# Patient Record
Sex: MALE | Race: WHITE | Employment: OTHER | URBAN - METROPOLITAN AREA
[De-identification: names, ages, dates, MRNs, and addresses within clinical notes are randomized per-mention and may not be internally consistent; named-entity substitution may affect disease eponyms.]

---

## 2017-04-27 ENCOUNTER — OFFICE VISIT (OUTPATIENT)
Dept: NEUROLOGY | Age: 62
End: 2017-04-27
Payer: MEDICARE

## 2017-04-27 VITALS
SYSTOLIC BLOOD PRESSURE: 144 MMHG | BODY MASS INDEX: 30.16 KG/M2 | DIASTOLIC BLOOD PRESSURE: 90 MMHG | WEIGHT: 181 LBS | HEIGHT: 65 IN | HEART RATE: 88 BPM

## 2017-04-27 DIAGNOSIS — F84.0 AUTISTIC SPECTRUM DISORDER: ICD-10-CM

## 2017-04-27 DIAGNOSIS — G40.309 GENERALIZED NONCONVULSIVE EPILEPSY (HCC): Primary | ICD-10-CM

## 2017-04-27 PROCEDURE — G8427 DOCREV CUR MEDS BY ELIG CLIN: HCPCS | Performed by: PSYCHIATRY & NEUROLOGY

## 2017-04-27 PROCEDURE — 1036F TOBACCO NON-USER: CPT | Performed by: PSYCHIATRY & NEUROLOGY

## 2017-04-27 PROCEDURE — 99214 OFFICE O/P EST MOD 30 MIN: CPT | Performed by: PSYCHIATRY & NEUROLOGY

## 2017-04-27 PROCEDURE — G8417 CALC BMI ABV UP PARAM F/U: HCPCS | Performed by: PSYCHIATRY & NEUROLOGY

## 2017-04-27 PROCEDURE — 3017F COLORECTAL CA SCREEN DOC REV: CPT | Performed by: PSYCHIATRY & NEUROLOGY

## 2017-04-27 RX ORDER — CARBAMAZEPINE 100 MG/1
100 CAPSULE, EXTENDED RELEASE ORAL DAILY
Qty: 30 CAPSULE | Refills: 5 | Status: SHIPPED | OUTPATIENT
Start: 2017-05-20 | End: 2017-10-18 | Stop reason: SDUPTHER

## 2017-04-28 ENCOUNTER — TELEPHONE (OUTPATIENT)
Dept: NEUROLOGY | Age: 62
End: 2017-04-28

## 2017-04-28 ENCOUNTER — HOSPITAL ENCOUNTER (OUTPATIENT)
Dept: LAB | Age: 62
Discharge: HOME | End: 2017-04-28
Payer: MEDICARE

## 2017-04-28 DIAGNOSIS — Z13.9 SCREENING: ICD-10-CM

## 2017-04-28 LAB
CHOLESTEROL/HDL RATIO: 5.6
CHOLESTEROL: 163 MG/DL
HDLC SERPL-MCNC: 29 MG/DL
LDL CHOLESTEROL: 85 MG/DL (ref 0–130)
TRIGL SERPL-MCNC: 244 MG/DL
VLDLC SERPL CALC-MCNC: ABNORMAL MG/DL (ref 1–30)

## 2017-04-28 PROCEDURE — 36415 COLL VENOUS BLD VENIPUNCTURE: CPT

## 2017-04-28 PROCEDURE — 80061 LIPID PANEL: CPT

## 2017-10-13 PROBLEM — K21.9 GASTROESOPHAGEAL REFLUX DISEASE: Status: ACTIVE | Noted: 2017-10-13

## 2017-10-20 RX ORDER — CARBAMAZEPINE 100 MG/1
CAPSULE, EXTENDED RELEASE ORAL
Qty: 30 CAPSULE | Refills: 0 | Status: SHIPPED | OUTPATIENT
Start: 2017-10-20 | End: 2017-11-16 | Stop reason: SDUPTHER

## 2017-11-17 RX ORDER — CARBAMAZEPINE 100 MG/1
CAPSULE, EXTENDED RELEASE ORAL
Qty: 30 CAPSULE | Refills: 2 | Status: SHIPPED | OUTPATIENT
Start: 2017-11-17 | End: 2018-02-14 | Stop reason: SDUPTHER

## 2018-02-15 RX ORDER — CARBAMAZEPINE 100 MG/1
CAPSULE, EXTENDED RELEASE ORAL
Qty: 30 CAPSULE | Refills: 2 | Status: SHIPPED | OUTPATIENT
Start: 2018-02-15 | End: 2021-07-27 | Stop reason: ALTCHOICE

## 2018-04-24 ENCOUNTER — OFFICE VISIT (OUTPATIENT)
Dept: NEUROLOGY | Age: 63
End: 2018-04-24
Payer: MEDICARE

## 2018-04-24 VITALS — WEIGHT: 172.4 LBS | BODY MASS INDEX: 28.72 KG/M2 | HEIGHT: 65 IN | HEART RATE: 78 BPM

## 2018-04-24 DIAGNOSIS — F84.0 AUTISTIC SPECTRUM DISORDER: ICD-10-CM

## 2018-04-24 DIAGNOSIS — G40.802 OTHER EPILEPSY WITHOUT STATUS EPILEPTICUS, NOT INTRACTABLE (HCC): ICD-10-CM

## 2018-04-24 DIAGNOSIS — G40.309 GENERALIZED NONCONVULSIVE EPILEPSY (HCC): Primary | ICD-10-CM

## 2018-04-24 PROCEDURE — G8427 DOCREV CUR MEDS BY ELIG CLIN: HCPCS | Performed by: PSYCHIATRY & NEUROLOGY

## 2018-04-24 PROCEDURE — 3017F COLORECTAL CA SCREEN DOC REV: CPT | Performed by: PSYCHIATRY & NEUROLOGY

## 2018-04-24 PROCEDURE — G8417 CALC BMI ABV UP PARAM F/U: HCPCS | Performed by: PSYCHIATRY & NEUROLOGY

## 2018-04-24 PROCEDURE — 1036F TOBACCO NON-USER: CPT | Performed by: PSYCHIATRY & NEUROLOGY

## 2018-04-24 PROCEDURE — 99214 OFFICE O/P EST MOD 30 MIN: CPT | Performed by: PSYCHIATRY & NEUROLOGY

## 2018-04-24 RX ORDER — QUETIAPINE FUMARATE 25 MG/1
25 TABLET, FILM COATED ORAL NIGHTLY
COMMUNITY

## 2018-06-07 ENCOUNTER — TELEPHONE (OUTPATIENT)
Dept: NEUROLOGY | Age: 63
End: 2018-06-07

## 2018-07-27 ENCOUNTER — HOSPITAL ENCOUNTER (OUTPATIENT)
Dept: PREADMISSION TESTING | Age: 63
Discharge: HOME | End: 2018-07-27

## 2018-08-14 ENCOUNTER — OFFICE VISIT (OUTPATIENT)
Dept: NEUROLOGY | Age: 63
End: 2018-08-14
Payer: MEDICARE

## 2018-08-14 VITALS — HEIGHT: 65 IN | WEIGHT: 172 LBS | BODY MASS INDEX: 28.66 KG/M2 | HEART RATE: 88 BPM

## 2018-08-14 DIAGNOSIS — F84.0 AUTISTIC SPECTRUM DISORDER: ICD-10-CM

## 2018-08-14 DIAGNOSIS — G40.802 OTHER EPILEPSY WITHOUT STATUS EPILEPTICUS, NOT INTRACTABLE (HCC): ICD-10-CM

## 2018-08-14 DIAGNOSIS — G40.309 GENERALIZED NONCONVULSIVE EPILEPSY (HCC): Primary | ICD-10-CM

## 2018-08-14 PROCEDURE — G8427 DOCREV CUR MEDS BY ELIG CLIN: HCPCS | Performed by: PSYCHIATRY & NEUROLOGY

## 2018-08-14 PROCEDURE — 1123F ACP DISCUSS/DSCN MKR DOCD: CPT | Performed by: PSYCHIATRY & NEUROLOGY

## 2018-08-14 PROCEDURE — G8417 CALC BMI ABV UP PARAM F/U: HCPCS | Performed by: PSYCHIATRY & NEUROLOGY

## 2018-08-14 PROCEDURE — 1036F TOBACCO NON-USER: CPT | Performed by: PSYCHIATRY & NEUROLOGY

## 2018-08-14 PROCEDURE — 4040F PNEUMOC VAC/ADMIN/RCVD: CPT | Performed by: PSYCHIATRY & NEUROLOGY

## 2018-08-14 PROCEDURE — 1101F PT FALLS ASSESS-DOCD LE1/YR: CPT | Performed by: PSYCHIATRY & NEUROLOGY

## 2018-08-14 PROCEDURE — 99213 OFFICE O/P EST LOW 20 MIN: CPT | Performed by: PSYCHIATRY & NEUROLOGY

## 2018-08-14 PROCEDURE — 3017F COLORECTAL CA SCREEN DOC REV: CPT | Performed by: PSYCHIATRY & NEUROLOGY

## 2018-08-14 NOTE — PROGRESS NOTES
NEUROLOGY FOLLOW-UP  Patient Name:  Fany Lemos  :   1955  Clinic Visit Date: 2018    I saw Mr. Fany Lemos in follow-up in the office today in continuation of neurologic care. REVIEW OF SYSTEMS  Constitutional Weight changes: absent, Fevers : absent, Fatigue: absent; Any recent hospitalizations:  absent.    HEENT Ears: normal, Eyes: normal, Visual disturbance: absent   Reespiratory Shortness of breath: absent, Cough: absent   Cardivascular Chest pain: absent, Leg swelling :absent   GI Constipation: absent, Diarrhea: absent, Swallowing change: absent    Urinary frequency: absent, Urinary urgency: absent, Urinary incontinence: absent   Musculoskeletal Neck pain: absent, Back pain: absent, Stiffness: absent, Muscle pain: absent, Joint pain: absent   Dermatological Hair loss: absent, Skin changes: absent   Neurological Memory loss: absent, Confusion: absent, Seizures: absent; Trouble walking or imbalance: absent, Dizziness: absent, Weakness: absent, Numbness absent, Tremor: absent, Spasm: absent, Speech difficulty: absent, Headache: absent, Light sensitivity: absent   Psychiatric Anxiety: present, Depression  absent, Suicidal ideations absent   Hematologic Abnormal bleeding: absent, Anemia: absent, Clotting disorder: absent, Lymph gland changes: absent

## 2019-02-15 ENCOUNTER — HOSPITAL ENCOUNTER (OUTPATIENT)
Dept: LAB | Age: 64
Discharge: HOME | End: 2019-02-15
Payer: MEDICARE

## 2019-02-15 LAB
ABSOLUTE EOS #: 0.2 K/UL (ref 0–0.44)
ABSOLUTE IMMATURE GRANULOCYTE: 0.03 K/UL (ref 0–0.3)
ABSOLUTE LYMPH #: 2.68 K/UL (ref 1.1–3.7)
ABSOLUTE MONO #: 0.68 K/UL (ref 0.1–1.2)
ANION GAP SERPL CALCULATED.3IONS-SCNC: 9 MMOL/L (ref 9–17)
BASOPHILS # BLD: 1 % (ref 0–2)
BASOPHILS ABSOLUTE: 0.06 K/UL (ref 0–0.2)
BUN BLDV-MCNC: 17 MG/DL (ref 8–23)
BUN/CREAT BLD: 18 (ref 9–20)
CALCIUM SERPL-MCNC: 9.4 MG/DL (ref 8.6–10.4)
CHLORIDE BLD-SCNC: 102 MMOL/L (ref 98–107)
CHOLESTEROL/HDL RATIO: 4.1
CHOLESTEROL: 115 MG/DL
CO2: 26 MMOL/L (ref 20–31)
CREAT SERPL-MCNC: 0.97 MG/DL (ref 0.7–1.2)
DIFFERENTIAL TYPE: NORMAL
EOSINOPHILS RELATIVE PERCENT: 3 % (ref 1–4)
ESTIMATED AVERAGE GLUCOSE: 94 MG/DL
GFR AFRICAN AMERICAN: >60 ML/MIN
GFR NON-AFRICAN AMERICAN: >60 ML/MIN
GFR SERPL CREATININE-BSD FRML MDRD: ABNORMAL ML/MIN/{1.73_M2}
GFR SERPL CREATININE-BSD FRML MDRD: ABNORMAL ML/MIN/{1.73_M2}
GLUCOSE BLD-MCNC: 101 MG/DL (ref 70–99)
HBA1C MFR BLD: 4.9 % (ref 4.8–5.9)
HCT VFR BLD CALC: 44.4 % (ref 40.7–50.3)
HDLC SERPL-MCNC: 28 MG/DL
HEMOGLOBIN: 14.8 G/DL (ref 13–17)
IMMATURE GRANULOCYTES: 0 %
LDL CHOLESTEROL: 59 MG/DL (ref 0–130)
LYMPHOCYTES # BLD: 34 % (ref 24–43)
MCH RBC QN AUTO: 29.1 PG (ref 25.2–33.5)
MCHC RBC AUTO-ENTMCNC: 33.3 G/DL (ref 28.4–34.8)
MCV RBC AUTO: 87.4 FL (ref 82.6–102.9)
MONOCYTES # BLD: 9 % (ref 3–12)
NRBC AUTOMATED: 0 PER 100 WBC
PDW BLD-RTO: 13.3 % (ref 11.8–14.4)
PLATELET # BLD: 189 K/UL (ref 138–453)
PLATELET ESTIMATE: NORMAL
PMV BLD AUTO: 10.4 FL (ref 8.1–13.5)
POTASSIUM SERPL-SCNC: 3.6 MMOL/L (ref 3.7–5.3)
RBC # BLD: 5.08 M/UL (ref 4.21–5.77)
RBC # BLD: NORMAL 10*6/UL
SEG NEUTROPHILS: 53 % (ref 36–65)
SEGMENTED NEUTROPHILS ABSOLUTE COUNT: 4.28 K/UL (ref 1.5–8.1)
SODIUM BLD-SCNC: 137 MMOL/L (ref 135–144)
TRIGL SERPL-MCNC: 140 MG/DL
VLDLC SERPL CALC-MCNC: ABNORMAL MG/DL (ref 1–30)
WBC # BLD: 7.9 K/UL (ref 3.5–11.3)
WBC # BLD: NORMAL 10*3/UL

## 2019-02-15 PROCEDURE — 83036 HEMOGLOBIN GLYCOSYLATED A1C: CPT

## 2019-02-15 PROCEDURE — 36415 COLL VENOUS BLD VENIPUNCTURE: CPT

## 2019-02-15 PROCEDURE — 85025 COMPLETE CBC W/AUTO DIFF WBC: CPT

## 2019-02-15 PROCEDURE — 80048 BASIC METABOLIC PNL TOTAL CA: CPT

## 2019-02-15 PROCEDURE — 80061 LIPID PANEL: CPT

## 2019-04-12 PROBLEM — R09.82 ALLERGIC RHINITIS WITH POSTNASAL DRIP: Status: ACTIVE | Noted: 2019-04-12

## 2019-04-12 PROBLEM — J30.9 ALLERGIC RHINITIS WITH POSTNASAL DRIP: Status: ACTIVE | Noted: 2019-04-12

## 2019-09-17 ENCOUNTER — OFFICE VISIT (OUTPATIENT)
Dept: NEUROLOGY | Age: 64
End: 2019-09-17
Payer: MEDICARE

## 2019-09-17 VITALS — RESPIRATION RATE: 16 BRPM | WEIGHT: 138 LBS | HEART RATE: 76 BPM | BODY MASS INDEX: 26.06 KG/M2 | HEIGHT: 61 IN

## 2019-09-17 DIAGNOSIS — F84.0 AUTISTIC SPECTRUM DISORDER: ICD-10-CM

## 2019-09-17 DIAGNOSIS — G40.309 GENERALIZED NONCONVULSIVE EPILEPSY (HCC): Primary | ICD-10-CM

## 2019-09-17 PROCEDURE — 99213 OFFICE O/P EST LOW 20 MIN: CPT | Performed by: PSYCHIATRY & NEUROLOGY

## 2019-09-17 PROCEDURE — 3017F COLORECTAL CA SCREEN DOC REV: CPT | Performed by: PSYCHIATRY & NEUROLOGY

## 2019-09-17 PROCEDURE — 1036F TOBACCO NON-USER: CPT | Performed by: PSYCHIATRY & NEUROLOGY

## 2019-09-17 PROCEDURE — 4040F PNEUMOC VAC/ADMIN/RCVD: CPT | Performed by: PSYCHIATRY & NEUROLOGY

## 2019-09-17 PROCEDURE — 1123F ACP DISCUSS/DSCN MKR DOCD: CPT | Performed by: PSYCHIATRY & NEUROLOGY

## 2019-09-17 PROCEDURE — G8427 DOCREV CUR MEDS BY ELIG CLIN: HCPCS | Performed by: PSYCHIATRY & NEUROLOGY

## 2019-09-17 PROCEDURE — G8417 CALC BMI ABV UP PARAM F/U: HCPCS | Performed by: PSYCHIATRY & NEUROLOGY

## 2019-09-23 PROBLEM — Z86.010 HISTORY OF COLON POLYPS: Status: ACTIVE | Noted: 2019-09-23

## 2019-09-23 PROBLEM — Z12.11 COLON CANCER SCREENING: Status: ACTIVE | Noted: 2019-09-23

## 2019-09-23 PROBLEM — Z86.0100 HISTORY OF COLON POLYPS: Status: ACTIVE | Noted: 2019-09-23

## 2019-11-04 ENCOUNTER — ANESTHESIA EVENT (OUTPATIENT)
Dept: OPERATING ROOM | Age: 64
End: 2019-11-04

## 2019-11-04 ENCOUNTER — ANESTHESIA (OUTPATIENT)
Dept: OPERATING ROOM | Age: 64
End: 2019-11-04

## 2019-11-10 PROBLEM — Z12.11 COLON CANCER SCREENING: Status: RESOLVED | Noted: 2019-09-23 | Resolved: 2019-11-10

## 2020-10-21 NOTE — PROGRESS NOTES
NEUROLOGY FOLLOW-UP  Patient Name:  Mari Dillard  :   1955  Clinic Visit Date: 10/22/2020  Last visit date: 2019      I saw Mr. Mari Dillard in follow-up in the office today in continuation of neurologic care. He is a 77 y.o.   male with hx of autistic spectrum disorder and seizures. Patient is brought to the clinic by Caregiver at [facility],  Mr. Ish Galvan stating that he has not had any witnessed seizure activity. He had been on slow downward taper of Tegretol. Staff at [facility] hasn't noticed him having any seizure spells. Caregivers were advised to stop Tegretol if it is okay with Dr. Jose Del Angel. It appears that patient is being continued on Tegretol for psychiatric reasons. He also hasn't had any hospitalizations or ED visits since the last visit. Of note; he has not had any witnessed the seizure activity since . CT head in  and EEG in 2010 were negative. His basic functional status remain unchanged. He feeds himself and he is dependent on rest of ADLs such as bathing, etc.        REVIEW OF SYSTEMS done by staff reviewed and pertinent negatives include absence of seizures and pertinent positives include speech difficulties associated with OCD, Autistic Spectrum Disorder.       Current Outpatient Medications on File Prior to Visit   Medication Sig Dispense Refill    loratadine (CLARITIN) 10 MG tablet Take 1 tablet by mouth daily 90 tablet 2    meloxicam (MOBIC) 7.5 MG tablet TAKE 1 TABLET BY MOUTH ONCE DAILY [id number] 90 tablet 2    omeprazole (PRILOSEC) 20 MG delayed release capsule TAKE 2 CAPSULES (40MG) BY MOUTH ONCE DAILY [id number] 180 capsule 2    Calcium Carbonate-Vitamin D (OYSTER SHELL CALCIUM/D) 500-200 MG-UNIT TABS TAKE ONE TABLET BY MOUTH TWO TIMES DAILY [id number] 180 tablet 2    acetaminophen (V-R NON-ASPIRIN) 500 MG tablet Take 2 tablets by mouth every 8 hours 60 tablet 3    oxybutynin (DITROPAN-XL) 5 MG extended release tablet Take 1 tablet by mouth daily 30 tablet 5    acetaminophen (TYLENOL) 500 MG tablet Take 2 tablets by mouth every 8 hours as needed for Pain or Fever 180 tablet 3    chlorhexidine (PERIDEX) 0.12 % solution Use orally to rinse twice daily with cotton swab 1 Bottle 2    Na Sulfate-K Sulfate-Mg Sulf (SUPREP BOWEL PREP KIT) 17.5-3.13-1.6 GM/177ML SOLN Take as directed 2 Bottle 0    OYSTER SHELL PO Take 500 mg by mouth 2 times daily      QUEtiapine (SEROQUEL) 25 MG tablet Take 25 mg by mouth nightly       carBAMazepine (CARBATROL) 100 MG extended release capsule TAKE 1 CAPSULE BY MOUTH ONCE DAILY  [id number] 30 capsule 2    UNABLE TO FIND Zostavax/shingles vaccine. 1 Act 0    Incontinence Supply Disposable (FQ PROTECTIVE UNDERWEAR) MISC USE AS DIRECTED FOR INCONTINENCE ([phone] *CALL PHARMACY FOR REFILLS* 80 Bottle 3    Disposable Gloves (VINYL GLOVES LARGE) MISC 1 PAIR AS DIRECTED QD- each 5    UNABLE TO FIND Zostavax 1 Act 0    neomycin-bacitracin-polymyxin (NEOSPORIN) 5-400-5000 ointment Apply topically 4 times daily prn to affected area(s) 28 g 3    sertraline (ZOLOFT) 50 MG tablet Take  by mouth daily. Take 1 1/2 tablets at bedtime       No current facility-administered medications on file prior to visit. Allergies: Venkata Moscoso has No Known Allergies. Past Medical History:   Diagnosis Date    Allergic rhinitis     Autism     Epilepsy ([facility] [address]     GERD (gastroesophageal reflux disease)     Hypertriglyceridemia     Mental retardation     Occupational circumstances     [employer]    Seizures ([facility] [address]        Past Surgical History:   Procedure Laterality Date    COLONOSCOPY  2012    Polyps     Social History: Venkata Moscoso  reports that he has never smoked. He has never used smokeless tobacco. He reports that he does not drink alcohol or use drugs. No family history on file.     On exam: vitals: Blood pressure 138/88, pulse 84, temperature 98.2 °F (36.8 °C), height 5' 5\" (1.651 m), weight 152 lb (68.9 kg).    Mr. Clarissa Vicente appears comfortable and with ongoing rocking movements and he does not seem to be in pain. He appears comfortable and alert and awake and oriented to name only. He does not follow commands. PERRLA and full EOMs noted. Face appears symmetric and tongue is midline. Palate is elevating symmetrically. Able to hear to finger rub equally well bilaterally. Moves all extremities spontaneously and purposefully. DTRs are symmetric and he withdraws to pinprick equally well. He is able to walk unassisted. He  has bilateral extensor plantar response. He is able to walk with out much support. He has bilateral extensor plantar response. Diagnostic data reviewed with the patient:   EEG (October 2013): This EEG performed during drowsiness and sleep did not demonstrate any ongoing electrographic seizure activity. But the study also demonstrated muscle artifacts during wakefulness. Clinical correlation is recommended. CT NFBA(3988): Negative for bleed and any acute intracranial abnormalities. CBC:     Lab Results   Component Value Date    WBC 7.9 02/15/2019    HGB 14.8 02/15/2019     02/15/2019      BMP:     Lab Results   Component Value Date     02/15/2019    K 3.6 (L) 02/15/2019    GLUCOSE 101 (H) 02/15/2019    CALCIUM 9.4 02/15/2019       Lab Results   Component Value Date    CBMZ 2.1 (L) 12/07/2015       Lab Results   Component Value Date    CHOL 115 02/15/2019    LDLCHOLESTEROL 59 02/15/2019    HDL 28 (L) 02/15/2019    TRIG 140 02/15/2019    ALT 29 12/08/2015    AST 27 12/08/2015    LABA1C 4.9 02/15/2019                   Impression and Plan: Mr. Louis Abernathy is a 77 y.o. male with   Diagnosis of seizure disorder: without any breakthrough  Seizure since 2011/ 2012. Patient's prior CT head and EEGs were negative. No witnessed spells. No reason to be on Tegretol for neurologic reasons. He is on Tegretol  mg once daily for psychiatric reasons.     It is okay to stop if it is agreeable with psychiatrist, Dr. Angeline Oshea. Caregivers were asked to contact the clinic should they notice any recurrence of signs and symptoms indicating seizure activity. Speech difficulty, stereotypic rocking back-and-forth body movements likely related to underlying Tourette's syndrome/comorbid OCD & comorbid autistic spectrum disorder: Under care of  Psychiatrist, Dr. Angeline Oshea. Follow up in 1 year. Please note that portions of this note were completed with a voice recognition program.  Although every effort was made to ensure the accuracy of this automated transcription, some errors in transcription may have occurred; occasionally words are mis-transcribed. Thank you for understanding.

## 2020-10-22 ENCOUNTER — OFFICE VISIT (OUTPATIENT)
Dept: NEUROLOGY | Age: 65
End: 2020-10-22
Payer: MEDICARE

## 2020-10-22 ENCOUNTER — TELEPHONE (OUTPATIENT)
Dept: NEUROLOGY | Age: 65
End: 2020-10-22

## 2020-10-22 VITALS
HEART RATE: 84 BPM | HEIGHT: 65 IN | BODY MASS INDEX: 25.33 KG/M2 | WEIGHT: 152 LBS | DIASTOLIC BLOOD PRESSURE: 88 MMHG | TEMPERATURE: 98.2 F | SYSTOLIC BLOOD PRESSURE: 138 MMHG

## 2020-10-22 PROBLEM — F98.4 STEREOTYPY: Status: ACTIVE | Noted: 2020-10-22

## 2020-10-22 PROCEDURE — 4040F PNEUMOC VAC/ADMIN/RCVD: CPT | Performed by: PSYCHIATRY & NEUROLOGY

## 2020-10-22 PROCEDURE — G8417 CALC BMI ABV UP PARAM F/U: HCPCS | Performed by: PSYCHIATRY & NEUROLOGY

## 2020-10-22 PROCEDURE — 3017F COLORECTAL CA SCREEN DOC REV: CPT | Performed by: PSYCHIATRY & NEUROLOGY

## 2020-10-22 PROCEDURE — 1036F TOBACCO NON-USER: CPT | Performed by: PSYCHIATRY & NEUROLOGY

## 2020-10-22 PROCEDURE — 99214 OFFICE O/P EST MOD 30 MIN: CPT | Performed by: PSYCHIATRY & NEUROLOGY

## 2020-10-22 PROCEDURE — G8482 FLU IMMUNIZE ORDER/ADMIN: HCPCS | Performed by: PSYCHIATRY & NEUROLOGY

## 2020-10-22 PROCEDURE — 1123F ACP DISCUSS/DSCN MKR DOCD: CPT | Performed by: PSYCHIATRY & NEUROLOGY

## 2020-10-22 PROCEDURE — G8427 DOCREV CUR MEDS BY ELIG CLIN: HCPCS | Performed by: PSYCHIATRY & NEUROLOGY

## 2020-10-22 NOTE — PROGRESS NOTES
HEENT [] Hearing Loss  [] Visual Disturbance  [] Tinnitus  [] Eye pain   Respiratory [] Shortness of Breath  [] Cough  [] Snoring   Cardiovascular [] Chest Pain  [] Palpitations  [] Lightheaded   GI [] Constipation  [] Diarrhea  [] Swallowing change  [] Nausea/vomiting    [] Urinary Frequency  [] Urinary Urgency   Musculoskeletal [] Neck pain  [] Back pain  [] Muscle pain  [] Restless legs   Dermatologic [] Skin changes   Neurologic [] Memory loss/confusion  [] Seizures  [] Trouble walking or imbalance  [] Dizziness  [] Sleep disturbance  [] Weakness  [] Numbness  [] Tremors  [x] Speech Difficulty  [] Headaches  [] Light Sensitivity  [] Sound Sensitivity   Endocrinology []Excessive thirst  []Excessive hunger   Psychiatric [] Anxiety/Depression  [] Hallucination   Allergy/immunology []Hives/environmental allergies   Hematologic/lymph [] Abnormal bleeding  [] Abnormal bruising

## 2020-10-22 NOTE — LETTER
 Autism     Epile[facility] ([facility] [address]     GERD (gastroesophageal reflux disease)     Hypertriglyceridemia     Mental retardation     Occupational circumstances     [employer]    Seizures ([facility] [address]        Past Surgical History:   Procedure Laterality Date    COLONOSCOPY  2012    Polyps     Social History: Louis Abernathy  reports that he has never smoked. He has never used smokeless tobacco. He reports that he does not drink alcohol or use drugs. No family history on file. On exam: vitals: Blood pressure 138/88, pulse 84, temperature 98.2 °F (36.8 °C), height 5' 5\" (1.651 m), weight 152 lb (68.9 kg). Mr. Clarissa Vicente appears comfortable and with ongoing rocking movements and he does not seem to be in pain. He appears comfortable and alert and awake and oriented to name only. He does not follow commands. PERRLA and full EOMs noted. Face appears symmetric and tongue is midline. Palate is elevating symmetrically. Able to hear to finger rub equally well bilaterally. Moves all extremities spontaneously and purposefully. DTRs are symmetric and he withdraws to pinprick equally well. He is able to walk unassisted. He  has bilateral extensor plantar response. He is able to walk with out much support. He has bilateral extensor plantar response. Diagnostic data reviewed with the patient:   EEG (October 2013): This EEG performed during drowsiness and sleep did not demonstrate any ongoing electrographic seizure activity. But the study also demonstrated muscle artifacts during wakefulness. Clinical correlation is recommended. CT EJIK(2091): Negative for bleed and any acute intracranial abnormalities.           CBC:     Lab Results   Component Value Date    WBC 7.9 02/15/2019    HGB 14.8 02/15/2019     02/15/2019      BMP:     Lab Results   Component Value Date     02/15/2019    K 3.6 (L) 02/15/2019    GLUCOSE 101 (H) 02/15/2019    CALCIUM 9.4 02/15/2019       Lab Results   Component Value Date CBMZ 2.1 (L) 12/07/2015       Lab Results   Component Value Date    CHOL 115 02/15/2019    LDLCHOLESTEROL 59 02/15/2019    HDL 28 (L) 02/15/2019    TRIG 140 02/15/2019    ALT 29 12/08/2015    AST 27 12/08/2015    LABA1C 4.9 02/15/2019                   Impression and Plan: Mr. Caryl Cullen is a 77 y.o. male with   Diagnosis of seizure disorder: without any breakthrough  Seizure since 2011/ 2012. Patient's prior CT head and EEGs were negative. No witnessed spells. No reason to be on Tegretol for neurologic reasons. He is on Tegretol  mg once daily for psychiatric reasons. It is okay to stop if it is agreeable with psychiatrist, Dr. Silva Osborn. Caregivers were asked to contact the clinic should they notice any recurrence of signs and symptoms indicating seizure activity. Speech difficulty, stereotypic rocking back-and-forth body movements likely related to underlying Tourette's syndrome/comorbid OCD & comorbid autistic spectrum disorder: Under care of  Psychiatrist, Dr. Silva Osborn. Follow up in 1 year. Please note that portions of this note were completed with a voice recognition program.  Although every effort was made to ensure the accuracy of this automated transcription, some errors in transcription may have occurred; occasionally words are mis-transcribed. Thank you for understanding. If you have questions, please do not hesitate to call me. I look forward to following Rogelio Diamond along with you.     Sincerely,        Ksenia Montanez MD

## 2020-10-23 ENCOUNTER — HOSPITAL ENCOUNTER (OUTPATIENT)
Dept: SPEECH THERAPY | Age: 65
Setting detail: THERAPIES SERIES
Discharge: HOME | End: 2020-10-23
Payer: MEDICARE

## 2020-10-23 PROCEDURE — 92610 EVALUATE SWALLOWING FUNCTION: CPT

## 2020-10-23 ASSESSMENT — PAIN - FUNCTIONAL ASSESSMENT: PAIN_FUNCTIONAL_ASSESSMENT: 0-10

## 2020-10-23 ASSESSMENT — PAIN SCALES - GENERAL: PAINLEVEL_OUTOF10: 0

## 2020-10-23 NOTE — PROGRESS NOTES
PO  Recommendations: Modified barium swallow study       Compensatory Swallowing Strategies  Compensatory Swallowing Strategies: Alternate solids and liquids;Small bites/sips;Upright as possible for all oral intake; External pacing;Eat/Feed slowly; Check for pocketing of food on the Right; Check for pocketing of food on the Left;Remain upright for 30-45 minutes after meals    Treatment/Goals  Short-term Goals  Timeframe for Short-term Goals: 120 Days  Goal 1: Patient will consume safest, least restrictive diet level without overt s/sx of asp/pen in 80% of opportunities. Long-term Goals  Timeframe for Long-term Goals: 60 Days  Goal 1: Patient will tolerate MBSS to objectively assess swallowing function. General  Chart Reviewed: Yes      Pain Level: 0    Vision/Hearing  Vision  Vision: Within Functional Limits  Hearing  Hearing: Within functional limits    Oral Motor Deficits  Oral/Motor  Oral Motor: Exceptions to [facility]  Labial Strength: Reduced  Lingual Coordination: Reduced    Oral Phase Dysfunction  Oral Phase  Oral Phase: Exceptions  Oral Phase Dysfunction  Impaired Mastication: Soft Solid  Lingual/Palatal Residue: Mechanical soft  Oral Phase  Oral Phase - Comment: Patient presents with a mild oral dysphagia at this time. Dysphagia is largely due to patient's edentulous status. ST was unable to complete oral motor examination due to patient not wanting to be touched as well as decreased ability to follow directions. Informally, it appeared patient demonstrated adequate lingual and labial ROM. Slightly reduced lingual strength ; however, his oral phase appears adequate for current mechanical soft diet. Patient was able to clear oral residues with liquid wash. Fast rate of intake noted.      Indicators of Pharyngeal Phase Dysfunction   Pharyngeal Phase  Pharyngeal Phase: Exceptions  Indicators of Pharyngeal Phase Dysfunction  Cough - Delayed: Puree;Mechanical soft  Pharyngeal Phase   Pharyngeal: Patient's pharyngeal phase of swallow required further asssessment. Patient demonstrated delayed cough x 1 on mechanical soft solids and x 1 with puree solids. Peicemeal swallowing suspected with thin liquids - Patient swallowing 5-6 times for small bolus of chocolate milk (only liquid patient drinks). Laryngeal elevation was unable to be assessed due to patient refusing to be touched. Prognosis  Prognosis  Prognosis for safe diet advancement: guarded  Barriers to reach goals: age;cognitive deficits  Individuals consulted  Consulted and agree with results and recommendations: Patient; Other (add comment)(Caregiver Cleven Drilling)    Education  Patient Education: ST educated patient's caregiver re: diet recommendations, compensatory strategies and plan of care. Patient Education Response: Verbalizes understanding             Therapy Time  SLP Individual Minutes  Time In: 1150  Time Out: 1678  Minutes: 30          Patient presents to [facility] for evaluation of swallowing function. Patient's caregiver reports he was recently coughing when eating sloppy ksenia (ground meat without the bun). Patient has also recently had dentition removed. Patient's caregiver reports he \"sometimes\" coughs when eating, but it is not consistent. Patient presents with a mild oral dysphagia at this time. Dysphagia is largely due to patient's edentulous status. ST was unable to complete oral motor examination due to patient not wanting to be touched as well as decreased ability to follow directions. Informally, it appeared patient demonstrated adequate lingual and labial ROM. Slightly reduced lingual strength ; however, his oral phase appears adequate for current mechanical soft diet. Patient was able to clear oral residues with liquid wash. Fast rate of intake noted. Patient's pharyngeal phase of swallow required further asssessment. Patient demonstrated delayed cough x 1 on mechanical soft solids and x 1 with puree solids.  Peicemeal swallowing suspected with thin liquids - Patient swallowing 5-6 times for small bolus of chocolate milk (only liquid patient drinks). Laryngeal elevation was unable to be assessed due to patient refusing to be touched. ST recommends continued diet of mechanical soft solids and thin liquids at this time. Compensatory strategies should include: small bites/sips,pacing/slow rate (present smaller portions of food and then give more as needed, upright during and 30-45 minutes after PO intake, check for pocketing, encourage alternating solids/liquids (to clear oral cavity). ST recommends MBSS to further objectively assess swallowing function and ensure safest, least restrictive diet level. Patient is not likely to benefit from dysphagia therapy due to inability to consistently follow directions. Short Term Goals: Completed by 60 days from this evaluation date  Dates of Service to Include: 10/23/20 through 12/22/20         Short-term Goal(s):   Goal 1: Patient will tolerate MBS to further assess swallowing function. Long Term Goals:   1. Patient/Caregiver will be independent with home exercise program  2. Goal 1: Patient will tolerate safest, least restrictive diet level. Patient tolerated todays evaluation:    [x] Good   []  Fair   []  Poor    Treatment Given Today: [x] Evaluation           [x]Plans/ Goals discussed with pt/family/caregiver(s)                                         [x] Risks Benefits discussed with pt/family/caregiver(s)    Functional Outcome Measure       RECOMMENDATIONS:   __Patient to be seen by ST times per []week                                                                     []Month                                              []other:  __ ST not warranted at this time. __ A re-evaluation is recommended in ___ months. _X_ Therapy pending MBSS results. The results of these tests and the recommendations were explained to the patient and his caregiver.  The caregiver

## 2021-01-14 ENCOUNTER — HOSPITAL ENCOUNTER (OUTPATIENT)
Dept: SPEECH THERAPY | Age: 66
Setting detail: THERAPIES SERIES
Discharge: HOME | End: 2021-01-14
Payer: MEDICARE

## 2021-04-23 ENCOUNTER — HOSPITAL ENCOUNTER (OUTPATIENT)
Dept: LAB | Age: 66
Setting detail: SPECIMEN
Discharge: HOME | End: 2021-04-23
Payer: MEDICARE

## 2021-04-23 DIAGNOSIS — Z01.818 PREOPERATIVE TESTING: Primary | ICD-10-CM

## 2021-04-23 DIAGNOSIS — Z01.818 PREOPERATIVE TESTING: ICD-10-CM

## 2021-04-23 PROCEDURE — U0003 INFECTIOUS AGENT DETECTION BY NUCLEIC ACID (DNA OR RNA); SEVERE ACUTE RESPIRATORY SYNDROME CORONAVIRUS 2 (SARS-COV-2) (CORONAVIRUS DISEASE [COVID-19]), AMPLIFIED PROBE TECHNIQUE, MAKING USE OF HIGH THROUGHPUT TECHNOLOGIES AS DESCRIBED BY CMS-2020-01-R: HCPCS

## 2021-04-23 PROCEDURE — C9803 HOPD COVID-19 SPEC COLLECT: HCPCS

## 2021-04-23 PROCEDURE — U0005 INFEC AGEN DETEC AMPLI PROBE: HCPCS

## 2021-04-24 LAB
SARS-COV-2: NORMAL
SARS-COV-2: NOT DETECTED
SOURCE: NORMAL

## 2021-04-25 ENCOUNTER — TELEPHONE (OUTPATIENT)
Dept: PRIMARY CARE CLINIC | Age: 66
End: 2021-04-25

## 2021-04-28 ENCOUNTER — HOSPITAL ENCOUNTER (OUTPATIENT)
Dept: GENERAL RADIOLOGY | Age: 66
Discharge: HOME | End: 2021-04-30
Payer: MEDICARE

## 2021-04-28 ENCOUNTER — HOSPITAL ENCOUNTER (OUTPATIENT)
Age: 66
Discharge: HOME | End: 2021-04-28
Payer: MEDICARE

## 2021-04-28 DIAGNOSIS — R13.10 ABNORMAL SWALLOWING: ICD-10-CM

## 2021-04-28 LAB
ABSOLUTE EOS #: 0.17 K/UL (ref 0–0.44)
ABSOLUTE IMMATURE GRANULOCYTE: 0.04 K/UL (ref 0–0.3)
ABSOLUTE LYMPH #: 2.35 K/UL (ref 1.1–3.7)
ABSOLUTE MONO #: 0.59 K/UL (ref 0.1–1.2)
ANION GAP SERPL CALCULATED.3IONS-SCNC: 10 MMOL/L (ref 9–17)
BASOPHILS # BLD: 1 % (ref 0–2)
BASOPHILS ABSOLUTE: 0.08 K/UL (ref 0–0.2)
BUN BLDV-MCNC: 21 MG/DL (ref 8–23)
BUN/CREAT BLD: 22 (ref 9–20)
CALCIUM SERPL-MCNC: 10 MG/DL (ref 8.6–10.4)
CHLORIDE BLD-SCNC: 108 MMOL/L (ref 98–107)
CHOLESTEROL/HDL RATIO: 3.9
CHOLESTEROL: 110 MG/DL
CO2: 25 MMOL/L (ref 20–31)
CREAT SERPL-MCNC: 0.96 MG/DL (ref 0.7–1.2)
DIFFERENTIAL TYPE: ABNORMAL
EOSINOPHILS RELATIVE PERCENT: 2 % (ref 1–4)
ESTIMATED AVERAGE GLUCOSE: 91 MG/DL
GFR AFRICAN AMERICAN: >60 ML/MIN
GFR NON-AFRICAN AMERICAN: >60 ML/MIN
GFR SERPL CREATININE-BSD FRML MDRD: ABNORMAL ML/MIN/{1.73_M2}
GFR SERPL CREATININE-BSD FRML MDRD: ABNORMAL ML/MIN/{1.73_M2}
GLUCOSE BLD-MCNC: 105 MG/DL (ref 70–99)
HBA1C MFR BLD: 4.8 % (ref 4–6)
HCT VFR BLD CALC: 44.7 % (ref 40.7–50.3)
HDLC SERPL-MCNC: 28 MG/DL
HEMOGLOBIN: 15.2 G/DL (ref 13–17)
IMMATURE GRANULOCYTES: 1 %
LDL CHOLESTEROL: 51 MG/DL (ref 0–130)
LYMPHOCYTES # BLD: 30 % (ref 24–43)
MCH RBC QN AUTO: 29.3 PG (ref 25.2–33.5)
MCHC RBC AUTO-ENTMCNC: 34 G/DL (ref 28.4–34.8)
MCV RBC AUTO: 86.3 FL (ref 82.6–102.9)
MONOCYTES # BLD: 8 % (ref 3–12)
NRBC AUTOMATED: 0 PER 100 WBC
PDW BLD-RTO: 13.2 % (ref 11.8–14.4)
PLATELET # BLD: 181 K/UL (ref 138–453)
PLATELET ESTIMATE: ABNORMAL
PMV BLD AUTO: 10.6 FL (ref 8.1–13.5)
POTASSIUM SERPL-SCNC: 3.8 MMOL/L (ref 3.7–5.3)
RBC # BLD: 5.18 M/UL (ref 4.21–5.77)
RBC # BLD: ABNORMAL 10*6/UL
SEG NEUTROPHILS: 58 % (ref 36–65)
SEGMENTED NEUTROPHILS ABSOLUTE COUNT: 4.57 K/UL (ref 1.5–8.1)
SODIUM BLD-SCNC: 143 MMOL/L (ref 135–144)
TRIGL SERPL-MCNC: 153 MG/DL
VLDLC SERPL CALC-MCNC: ABNORMAL MG/DL (ref 1–30)
WBC # BLD: 7.8 K/UL (ref 3.5–11.3)
WBC # BLD: ABNORMAL 10*3/UL

## 2021-04-28 PROCEDURE — A4641 RADIOPHARM DX AGENT NOC: HCPCS | Performed by: INTERNAL MEDICINE

## 2021-04-28 PROCEDURE — 80048 BASIC METABOLIC PNL TOTAL CA: CPT

## 2021-04-28 PROCEDURE — 83036 HEMOGLOBIN GLYCOSYLATED A1C: CPT

## 2021-04-28 PROCEDURE — 85025 COMPLETE CBC W/AUTO DIFF WBC: CPT

## 2021-04-28 PROCEDURE — 80061 LIPID PANEL: CPT

## 2021-04-28 PROCEDURE — 36415 COLL VENOUS BLD VENIPUNCTURE: CPT

## 2021-04-28 PROCEDURE — 74230 X-RAY XM SWLNG FUNCJ C+: CPT

## 2021-04-28 PROCEDURE — 92611 MOTION FLUOROSCOPY/SWALLOW: CPT

## 2021-04-28 PROCEDURE — 6360000004 HC RX CONTRAST MEDICATION: Performed by: INTERNAL MEDICINE

## 2021-04-28 RX ADMIN — BARIUM SULFATE 355 ML: 0.6 SUSPENSION ORAL at 08:53

## 2021-04-28 ASSESSMENT — PAIN - FUNCTIONAL ASSESSMENT: PAIN_FUNCTIONAL_ASSESSMENT: FACES

## 2021-04-28 ASSESSMENT — PAIN SCALES - GENERAL: PAINLEVEL_OUTOF10: 0

## 2021-04-28 NOTE — PROCEDURES
INSTRUMENTAL SWALLOW REPORT  MODIFIED BARIUM SWALLOW    NAME: Han Hodges   : 1955  MRN: [id number]       Date of Eval: 2021     Ordering Physician: Dr. Nate Michaels   Referring Diagnosis(es):  r13.10    Past Medical History:  has a past medical history of Allergic rhinitis, Autism, Epilepsy ([facility] [address] GERD (gastroesophageal reflux disease), Hypertriglyceridemia, Mental retardation, Occupational circumstances, and Seizures ([facility] [address] Past Surgical History:  has a past surgical history that includes Colonoscopy (). Current Diet Solid Consistency: Dysphagia Minced and Moist (Dysphagia II)  Current Diet Liquid Consistency: Thin       Type of Study: Initial MBS       Recent CXR/CT of Chest: No recent chest x-ray on file. Patient Complaints/Reason for Referral:  Han Hodges was referred for a MBS to assess the efficiency of his/her swallow function, assess for aspiration, and to make recommendations regarding safe dietary consistencies, effective compensatory strategies, and safe eating environment. Patient complaints: Patient's caregiver reports patient occasionaly coughs after eating and drinking. She states patient takes large bites and sips, but has a sippy cup at home that he uses. She also reports using smaller spoons so patient will take smaller bites. Behavior/Cognition/Vision/Hearing:  Vision: Within Functional Limits  Hearing: Within functional limits    Impressions:  Treatment Dx and ICD 10: r13.12   Patient Position: Lateral and Patient Degrees: 90      Consistencies Administered: Dysphagia Soft and Bite-Sized (Dysphagia III); Dysphagia Minced and Moist (Dysphagia II); Dysphagia Pureed (Dysphagia I); Thin cup    Compensatory Swallowing Strategies Attempted: Alternate solids and liquids;Eat/Feed slowly; No straws;Upright as possible for all oral intake;Small bites/sips  Postural Changes and/or Swallow Maneuvers Trialed: Upright          Dysphagia Outcome Severity Scale: Level 4: Mild moderate dysphagia- Intermittent supervision/cueing. One - two diet consistencies restricted  Penetration-Aspiration Scale (PAS): 1 - Material does not enter the airway    Recommended Diet:  Solid consistency: Dysphagia Minced and Moist (Dysphagia II)  Liquid consistency: Thin  Liquid administration via: Spoon;Cup    Medication administration: Meds in puree    Safe Swallow Protocol:  Supervision: Close  Compensatory Swallowing Strategies: Alternate solids and liquids;Small bites/sips;Upright as possible for all oral intake; External pacing;Eat/Feed slowly; Check for pocketing of food on the Right; Check for pocketing of food on the Left;Remain upright for 30-45 minutes after meals              Recommendations/Treatment  Requires SLP Intervention: No        D/C Recommendations: 24 hour supervision/assistance  Postural Changes and/or Swallow Maneuvers: Upright;Upright 30 min after meal      Education: Images and recommendations were reviewed with this patient and his caregiver following this exam.   Patient Education: ST educated patient's caregiver re: diet recommendations, compensatory strategies and plan of care. Patient Education Response: Verbalizes understanding    Prognosis  Prognosis for safe diet advancement: guarded  Barriers to reach goals: age;cognitive deficits          Oral Preparation / Oral Phase  Oral Phase: Impaired  Oral Phase - Major Contributing Deficits  Premature Bolus Loss to Pharynx: All  Reduced Tongue Base Retraction: All    Oral Phase: Patient presents with a mild oral dysphagia. Patient demonstrated premature bolus loss with all consistencies. Patient also demonstrating reduced base of tongue retraction with all consistencies. Oral motor examination not able to be completed due to patient's cognitive status and agitation. Patient is edentulous; however, demonstrates functional bolus manipulation for minced and moist solids.         Pharyngeal Phase  Pharyngeal Phase: Impaired  Pharyngeal Phase - Major Contributing Deficits  Delayed Swallow Initiation: All  Premature Spillage to Valleculae: All  Pooling Pyriform: All  Delayed Cough Reflex: Thin cup  Pharyngeal Residue - Pyriform: All  Pharyngeal: Patient presents with a mild pharyngeal phase dysphagia characterized by delayed swallow initiation with premature spillage to the valleculae with all consistencies. Patient demonstrated moderate pyriform sinus residues with thin liquids and mild pyriform sinus residues with nectar-thick liquids and all solid consistencies. No gross aspiration observed ;however study is limited due to patient rocking back and forth causing poor visualization of swallow. Patient noted to have delayed cough with thin liquids. No delayed cough with nectar-thick liquids. Esophageal Phase    Upper Esophageal Screen: Limited view, unable to determine         Pain   Patient Currently in Pain: No  Pain Level: 0      Therapy Time:   Individual   Time In 0830   Time Out 0900   Minutes 30           Patient presents to [facility] Radiology Department for Modified Barium Swallow Study with his caregiver. Patient lives in a group home and currently is on a diet of minced and moist and thin liquids based on caregiver's description. Patient's caregiver reports patient occasionaly coughs after eating and drinking. She states patient takes large bites and sips, but has a sippy cup at home that he uses. She also reports using smaller spoons so patient will take smaller bites. MBSS is somewhat limited this date due to patient's rocking back and forth causing poor video imaging. Patient presents with a mild oral dysphagia. Patient demonstrated premature bolus loss with all consistencies. Patient also demonstrating reduced base of tongue retraction with all consistencies. Oral motor examination not able to be completed due to patient's cognitive status and agitation. Anterior spillage noted with thin liquids. Patient is edentulous; however, demonstrates functional bolus manipulation for minced and moist solids.'    Patient presents with a mild pharyngeal phase dysphagia characterized by delayed swallow initiation with premature spillage to the valleculae with all consistencies. Patient demonstrated moderate pyriform sinus residues with thin liquids and mild pyriform sinus residues with nectar-thick liquids and all solid consistencies. No gross aspiration observed ;however study is limited due to patient rocking back and forth causing poor visualization of swallow. Aspiration may occur from pyriform sinus residues. Patient noted to have delayed cough with thin liquids. No delayed cough with nectar-thick liquids. ST recommends continued solid diet of minced and moist solids and downgrade to nectar-thick liquids due to increased pyriform sinus residues with thin liquids and delayed cough. Compensatory strategies should include: small bites/sips (continued use of smaller spoon encouraged and small portions in cup), patient should remain upright during and 30-45 minutes after eating and drinking, alternate bites/sips, use extra gravies and condiments with foods as able. Patient would not be a good candidate for therapy due to inability to consistently follow directions.      Noma Koyanagi M.S. Runkelen   4/28/2021, 9:04 AM

## 2021-07-26 ENCOUNTER — HOSPITAL ENCOUNTER (EMERGENCY)
Age: 66
Discharge: HOME | End: 2021-07-26
Payer: MEDICARE

## 2021-07-26 ENCOUNTER — APPOINTMENT (OUTPATIENT)
Dept: GENERAL RADIOLOGY | Age: 66
End: 2021-07-26
Payer: MEDICARE

## 2021-07-26 VITALS
SYSTOLIC BLOOD PRESSURE: 137 MMHG | OXYGEN SATURATION: 95 % | RESPIRATION RATE: 18 BRPM | HEART RATE: 100 BPM | DIASTOLIC BLOOD PRESSURE: 115 MMHG

## 2021-07-26 DIAGNOSIS — L03.115 CELLULITIS OF RIGHT LOWER EXTREMITY: Primary | ICD-10-CM

## 2021-07-26 PROCEDURE — 99282 EMERGENCY DEPT VISIT SF MDM: CPT

## 2021-07-26 PROCEDURE — 73630 X-RAY EXAM OF FOOT: CPT

## 2021-07-26 RX ORDER — CEPHALEXIN 500 MG/1
500 CAPSULE ORAL 4 TIMES DAILY
Qty: 40 CAPSULE | Refills: 0 | Status: SHIPPED | OUTPATIENT
Start: 2021-07-26 | End: 2021-09-27

## 2021-07-26 RX ORDER — SULFAMETHOXAZOLE AND TRIMETHOPRIM 800; 160 MG/1; MG/1
1 TABLET ORAL 2 TIMES DAILY
Qty: 20 TABLET | Refills: 0 | Status: SHIPPED | OUTPATIENT
Start: 2021-07-26 | End: 2021-09-27

## 2021-07-27 ASSESSMENT — ENCOUNTER SYMPTOMS
DIARRHEA: 0
EYE REDNESS: 0
BLOOD IN STOOL: 0
NAUSEA: 0
VOMITING: 0
WHEEZING: 0
COUGH: 0
BACK PAIN: 0
SORE THROAT: 0
CONSTIPATION: 0
ABDOMINAL PAIN: 0
SHORTNESS OF BREATH: 0
EYE DISCHARGE: 0
CHEST TIGHTNESS: 0
RHINORRHEA: 0

## 2021-07-27 NOTE — PROGRESS NOTES
Claudia case manageer instructed on the pre-operative, intra-operative, and post-operative process. Patient's  instructed on pt's NPO status. Medication instructions and Pre operative instruction sheet reviewed over the phone with Claudia . Claudia states that the patient will only take his medications with applesauce or pudding. Angeles Frazier with anesthesia notified of this and that the patient will not be able to take his xanax prior to arrival. LIZZIE العلي to hold pt's mobic until after surgery per Dr. Lara Hope instructions.

## 2021-07-27 NOTE — ED PROVIDER NOTES
[address] [facility] [place] ED  EMERGENCY DEPARTMENT ENCOUNTER      Pt Name: Juliann Moura  MRN: [id number]  [place] 1955  Date of evaluation: 7/26/2021  Provider: Aashish Lozoya Dr     Chief Complaint   Patient presents with    Toe Injury     right great toe wound needs checked ongoing for 2 weeks. HISTORY OF PRESENT ILLNESS   (Location/Symptom, Timing/Onset, Context/Setting,Quality, Duration, Modifying Factors, Severity)  Note limiting factors. Juliann Moura is a75 y.o. male who presents to the emergency department with complaints of right great toe and discomfort over the past 2 days. Patient's caregivers at the bedside reports that he sees podiatry but they cannot get in today. Given that his toenail is long and growing into his side of his toe they decided to come the ER for further evaluation. Denies any fever or chills. Reports patient still ambulatory. This is a limited HPI given patient's inability to fully cooperate. No other complaints. HPI    Nursing Notes werereviewed. REVIEW OF SYSTEMS    (2-9 systems for level 4, 10 or more for level 5)     Review of Systems   Constitutional: Negative for chills, diaphoresis and fever. HENT: Negative for congestion, ear pain, rhinorrhea and sore throat. Eyes: Negative for discharge, redness and visual disturbance. Respiratory: Negative for cough, chest tightness, shortness of breath and wheezing. Cardiovascular: Negative for chest pain and palpitations. Gastrointestinal: Negative for abdominal pain, blood in stool, constipation, diarrhea, nausea and vomiting. Endocrine: Negative for polydipsia, polyphagia and polyuria. Genitourinary: Negative for decreased urine volume, difficulty urinating, dysuria, frequency and hematuria. Musculoskeletal: Negative for arthralgias, back pain and myalgias. Skin: Positive for wound. Negative for pallor and rash.    Allergic/Immunologic: Negative for food allergies and immunocompromised state. Neurological: Negative for dizziness, syncope, weakness and light-headedness. Hematological: Negative for adenopathy. Does not bruise/bleed easily. Psychiatric/Behavioral: Negative for behavioral problems and suicidal ideas. The patient is not nervous/anxious. Except as noted above the remainder of the review of systems was reviewed and negative.        PAST MEDICAL HISTORY     Past Medical History:   Diagnosis Date    Allergic rhinitis     Autism     Epilepsy ([facility] [address]     GERD (gastroesophageal reflux disease)     Hypertriglyceridemia     Mental retardation     Occupational circumstances     [employer]    Seizures ([facility] [address]          SURGICALHISTORY       Past Surgical History:   Procedure Laterality Date    COLONOSCOPY  2012    Polyps         CURRENT MEDICATIONS       Discharge Medication List as of 7/26/2021  5:31 PM      CONTINUE these medications which have NOT CHANGED    Details   Incontinence Supply Disposable (FQ PROTECTIVE UNDERWEAR) MISC Disp-80 Bottle, R-5, NormalUSE AS DIRECTED FOR INCONTINENCE (319) *CALL PHARMACY FOR REFILLS*  SIZE SMALL/MEDIUM      !! acetaminophen (ACETAMINOPHEN EXTRA STRENGTH) 500 MG tablet Take 2 tablets by mouth every 8 hours as needed for Pain or Fever, Disp-180 tablet, R-2Normal      !! UNABLE TO FIND Simply thickener to be used with nectar for aspiration monthly, Disp-1 box, R-5Print      loratadine (CLARITIN) 10 MG tablet Take 1 tablet by mouth daily, Disp-90 tablet, R-2Normal      oxybutynin (DITROPAN-XL) 5 MG extended release tablet Take 1 tablet by mouth daily, Disp-30 tablet, R-5Normal      chlorhexidine (PERIDEX) 0.12 % solution Use orally to rinse twice daily with cotton swab, Disp-473 mL, R-2Normal      omeprazole (PRILOSEC) 20 MG delayed release capsule Take 2 capsules by mouth Daily, Disp-180 capsule, R-2Normal      Calcium Carbonate-Vitamin D (OYSTER SHELL CALCIUM/D) 500-200 MG-UNIT TABS Take 1 tablet by mouth 2 times daily, Disp-180 tablet, R-2Normal      meloxicam (MOBIC) 7.5 MG tablet Take 1 tablet by mouth daily, Disp-90 tablet, R-2Normal      !! acetaminophen (V-R NON-ASPIRIN) 500 MG tablet Take 2 tablets by mouth every 8 hours, Disp-60 tablet,R-3Normal      Na Sulfate-K Sulfate-Mg Sulf (SUPREP BOWEL PREP KIT) 17.5-3.13-1.6 GM/177ML SOLN Take as directed, Disp-2 Bottle, R-0Normal      OYSTER SHELL PO Take 500 mg by mouth 2 times dailyHistorical Med      QUEtiapine (SEROQUEL) 25 MG tablet Take 25 mg by mouth nightly Historical Med      carBAMazepine (CARBATROL) 100 MG extended release capsule TAKE 1 CAPSULE BY MOUTH ONCE DAILY  [id number], Disp-30 capsule, R-2Normal      !! UNABLE TO FIND Zostavax/shingles vaccine., Disp-1 Act, R-0Print      Disposable Gloves (VINYL GLOVES LARGE) MISC 1 PAIR AS DIRECTED QD-BID, Disp-100 each, R-5Normal      !! UNABLE TO FIND Zostavax, Disp-1 Act, R-0Print      neomycin-bacitracin-polymyxin (NEOSPORIN) 5-400-5000 ointment Apply topically 4 times daily prn to affected area(s), Disp-28 g, R-3, Print      sertraline (ZOLOFT) 50 MG tablet Take  by mouth daily. Take 1 1/2 tablets at bedtimeHistorical Med       !! - Potential duplicate medications found. Please discuss with provider. ALLERGIES   Patient has no known allergies. FAMILY HISTORY     No family history on file.        SOCIAL HISTORY       Social History     Socioeconomic History    Marital status: Single     Spouse name: Not on file    Number of children: Not on file    Years of education: Not on file    Highest education level: Not on file   Occupational History    Not on file   Tobacco Use    Smoking status: Never Smoker    Smokeless tobacco: Never Used   Vaping Use    Vaping Use: Never used   Substance and Sexual Activity    Alcohol use: No     Alcohol/week: 0.0 standard drinks    Drug use: No    Sexual activity: Not on file   Other Topics Concern    Not on file   Social History Narrative    Not on file Social Determinants of Health     Financial Resource Strain:     Difficulty of Paying Living Expenses:    Food Insecurity:     Worried About Running Out of Food in the Last Year:     [address] in the Last Year:    Transportation Needs:     Lack of Transportation (Medical):  Lack of Transportation (Non-Medical):    Physical Activity:     Days of Exercise per Week:     Minutes of Exercise per Session:    Stress:     Feeling of Stress :    Social Connections:     Frequency of Communication with Friends and Family:     Frequency of Social Gatherings with Friends and Family:     Attends [employer] Services:     Active Member of Clubs or Organizations:     Attends Club or Organization Meetings:     Marital Status:    Intimate Partner Violence:     Fear of Current or Ex-Partner:     Emotionally Abused:     Physically Abused:     Sexually Abused:        SCREENINGS             PHYSICAL EXAM    (up to 7 for level 4, 8 or more for level 5)     ED Triage Vitals [07/26/21 1641]   BP Temp Temp src Pulse Resp SpO2 Height Weight   (!) 137/115 -- -- 100 18 95 % -- --       Physical Exam  Vitals and nursing note reviewed. Constitutional:       General: He is not in acute distress. Appearance: He is well-developed. He is not diaphoretic. HENT:      Head: Normocephalic and atraumatic. Right Ear: External ear normal.      Left Ear: External ear normal.   Eyes:      General: No scleral icterus. Right eye: No discharge. Left eye: No discharge. Conjunctiva/sclera: Conjunctivae normal.   Neck:      Trachea: No tracheal deviation. Cardiovascular:      Rate and Rhythm: Normal rate and regular rhythm. Pulmonary:      Effort: Pulmonary effort is normal. No respiratory distress. Breath sounds: No stridor. Musculoskeletal:         General: Tenderness present. No deformity. Normal range of motion. Cervical back: Normal range of motion and neck supple.       Comments: There is tenderness of the right great toe with surrounding erythema. There is no drainable abscess. No skin necrosis no cyanosis no gangrenous findings. Intact distal pulses sensation cap refills less than 2 seconds. Skin:     General: Skin is warm and dry. Capillary Refill: Capillary refill takes less than 2 seconds. Findings: No erythema or rash. Neurological:      General: No focal deficit present. Mental Status: He is alert and oriented to person, place, and time. Cranial Nerves: No cranial nerve deficit. Motor: No abnormal muscle tone. Deep Tendon Reflexes: Reflexes are normal and symmetric. Psychiatric:         Behavior: Behavior normal.         DIAGNOSTIC RESULTS     EKG: All EKG's are interpreted by the Emergency Department Physician who either signs orCo-signs this chart in the absence of a cardiologist.      RADIOLOGY:   Non-plainfilm images such as CT, Ultrasound and MRI are read by the radiologist. Plain radiographic images are visualized and preliminarily interpreted by the emergency physician with the below findings:      Interpretationper the Radiologist below, if available at the time of this note:    XR FOOT RIGHT (MIN 3 VIEWS)   Final Result   Radiographic evaluation is limited due to the digits overlapping and   difficulty in positioning the patient. Soft tissue swelling without discrete radiographic confirmation of   osteomyelitis, though this evaluation is relatively insensitive for such. ED BEDSIDE ULTRASOUND:   Performed by ED Physician - none    LABS:  Labs Reviewed - No data to display    All other labs were within normal range or not returned as of this dictation. EMERGENCY DEPARTMENT COURSE and DIFFERENTIAL DIAGNOSIS/MDM:   Vitals:    Vitals:    07/26/21 1641   BP: (!) 137/115   Pulse: 100   Resp: 18   SpO2: 95%         MDM  57-year-old male who presents secondary to right toe swelling and discomfort.   Appears to have an overlying cellulitis. He does need treatment and wound care. X-ray does not show any acute findings. I called and spoke with his podiatrist, Dr. Deidre Lainez. He is very familiar with this patient. He agrees with plan to treat potential infection and he will see patient in follow-up as availability to see him tomorrow if the staff will call. I discussed all this with the staff at the bedside. Strict and specific return 7 given. They verbalized agreement's plan. Questions of answer only. They will otherwise return immediately to the ER with any new or worsening complaints. Consideration made for gout however given patient's history I do think this more likely infectious in origin. Procedures    FINAL IMPRESSION      1.  Cellulitis of right lower extremity        DISPOSITION/PLAN   DISPOSITION Decision To Discharge 07/26/2021 05:29:16 PM      PATIENT REFERRED TO:  [facility] ED  [address]  [phone]    If symptoms worsen, As needed    Jodi Ovalles DPM  [address] [phone]    Schedule an appointment as soon as possible for a visit in 1 day        DISCHARGE MEDICATIONS:  Discharge Medication List as of 7/26/2021  5:31 PM      START taking these medications    Details   cephALEXin (KEFLEX) 500 MG capsule Take 1 capsule by mouth 4 times daily for 10 days, Disp-40 capsule, R-0Normal      sulfamethoxazole-trimethoprim (BACTRIM DS) 800-160 MG per tablet Take 1 tablet by mouth 2 times daily for 10 days, Disp-20 tablet, R-0Normal                    Summation      Patient Course:      ED Medications administered this visit:  Medications - No data to display    New Prescriptions from this visit:    Discharge Medication List as of 7/26/2021  5:31 PM      START taking these medications    Details   cephALEXin (KEFLEX) 500 MG capsule Take 1 capsule by mouth 4 times daily for 10 days, Disp-40 capsule, R-0Normal      sulfamethoxazole-trimethoprim (BACTRIM DS) 800-160 MG per tablet Take 1 tablet by mouth 2 times daily for 10 days, Disp-20 tablet, R-0Normal             Follow-up:  [facility] ED  [address] [facility]  [phone]    If symptoms worsen, As needed    Alexandrea Padron DPM  [address] [facility] [address] [id number] [phone]    Schedule an appointment as soon as possible for a visit in 1 day          Final Impression:   1.  Cellulitis of right lower extremity               (Please note that portions of this note were completed with a voice recognition program.  Efforts were made to edit the dictations but occasionally words are mis-transcribed.)           Kristal Arce PA-C  07/27/21 2901

## 2021-07-28 ENCOUNTER — ANESTHESIA EVENT (OUTPATIENT)
Dept: OPERATING ROOM | Age: 66
End: 2021-07-28
Payer: MEDICARE

## 2021-07-29 ENCOUNTER — HOSPITAL ENCOUNTER (OUTPATIENT)
Age: 66
Setting detail: OUTPATIENT SURGERY
Discharge: HOME | End: 2021-07-29
Attending: PODIATRIST | Admitting: PODIATRIST
Payer: MEDICARE

## 2021-07-29 ENCOUNTER — ANESTHESIA (OUTPATIENT)
Dept: OPERATING ROOM | Age: 66
End: 2021-07-29
Payer: MEDICARE

## 2021-07-29 VITALS
DIASTOLIC BLOOD PRESSURE: 68 MMHG | TEMPERATURE: 98.3 F | HEART RATE: 78 BPM | WEIGHT: 141 LBS | RESPIRATION RATE: 18 BRPM | BODY MASS INDEX: 23.46 KG/M2 | SYSTOLIC BLOOD PRESSURE: 122 MMHG | OXYGEN SATURATION: 98 %

## 2021-07-29 VITALS — SYSTOLIC BLOOD PRESSURE: 97 MMHG | OXYGEN SATURATION: 98 % | DIASTOLIC BLOOD PRESSURE: 48 MMHG

## 2021-07-29 PROCEDURE — 3700000000 HC ANESTHESIA ATTENDED CARE: Performed by: PODIATRIST

## 2021-07-29 PROCEDURE — 6360000002 HC RX W HCPCS: Performed by: PODIATRIST

## 2021-07-29 PROCEDURE — 3600000012 HC SURGERY LEVEL 2 ADDTL 15MIN: Performed by: PODIATRIST

## 2021-07-29 PROCEDURE — 7100000011 HC PHASE II RECOVERY - ADDTL 15 MIN: Performed by: PODIATRIST

## 2021-07-29 PROCEDURE — 3600000002 HC SURGERY LEVEL 2 BASE: Performed by: PODIATRIST

## 2021-07-29 PROCEDURE — 6360000002 HC RX W HCPCS: Performed by: NURSE ANESTHETIST, CERTIFIED REGISTERED

## 2021-07-29 PROCEDURE — 2500000003 HC RX 250 WO HCPCS: Performed by: PODIATRIST

## 2021-07-29 PROCEDURE — 2580000003 HC RX 258: Performed by: NURSE ANESTHETIST, CERTIFIED REGISTERED

## 2021-07-29 PROCEDURE — 2709999900 HC NON-CHARGEABLE SUPPLY: Performed by: PODIATRIST

## 2021-07-29 PROCEDURE — 2580000003 HC RX 258: Performed by: PODIATRIST

## 2021-07-29 PROCEDURE — 2500000003 HC RX 250 WO HCPCS: Performed by: NURSE ANESTHETIST, CERTIFIED REGISTERED

## 2021-07-29 PROCEDURE — 3700000001 HC ADD 15 MINUTES (ANESTHESIA): Performed by: PODIATRIST

## 2021-07-29 PROCEDURE — 7100000010 HC PHASE II RECOVERY - FIRST 15 MIN: Performed by: PODIATRIST

## 2021-07-29 RX ORDER — FENTANYL CITRATE 50 UG/ML
50 INJECTION, SOLUTION INTRAMUSCULAR; INTRAVENOUS EVERY 5 MIN PRN
Status: DISCONTINUED | OUTPATIENT
Start: 2021-07-29 | End: 2021-07-29 | Stop reason: HOSPADM

## 2021-07-29 RX ORDER — SODIUM CHLORIDE, SODIUM LACTATE, POTASSIUM CHLORIDE, CALCIUM CHLORIDE 600; 310; 30; 20 MG/100ML; MG/100ML; MG/100ML; MG/100ML
INJECTION, SOLUTION INTRAVENOUS CONTINUOUS PRN
Status: DISCONTINUED | OUTPATIENT
Start: 2021-07-29 | End: 2021-07-29 | Stop reason: SDUPTHER

## 2021-07-29 RX ORDER — ONDANSETRON 2 MG/ML
4 INJECTION INTRAMUSCULAR; INTRAVENOUS
Status: DISCONTINUED | OUTPATIENT
Start: 2021-07-29 | End: 2021-07-29 | Stop reason: HOSPADM

## 2021-07-29 RX ORDER — KETAMINE HCL 50MG/ML(1)
SYRINGE (ML) INTRAVENOUS PRN
Status: DISCONTINUED | OUTPATIENT
Start: 2021-07-29 | End: 2021-07-29 | Stop reason: SDUPTHER

## 2021-07-29 RX ORDER — ACETAMINOPHEN 325 MG/1
650 TABLET ORAL ONCE
Status: DISCONTINUED | OUTPATIENT
Start: 2021-07-29 | End: 2021-07-29 | Stop reason: HOSPADM

## 2021-07-29 RX ORDER — SODIUM CHLORIDE, SODIUM LACTATE, POTASSIUM CHLORIDE, CALCIUM CHLORIDE 600; 310; 30; 20 MG/100ML; MG/100ML; MG/100ML; MG/100ML
INJECTION, SOLUTION INTRAVENOUS CONTINUOUS
Status: DISCONTINUED | OUTPATIENT
Start: 2021-07-29 | End: 2021-07-29 | Stop reason: HOSPADM

## 2021-07-29 RX ORDER — LABETALOL HYDROCHLORIDE 5 MG/ML
INJECTION, SOLUTION INTRAVENOUS PRN
Status: DISCONTINUED | OUTPATIENT
Start: 2021-07-29 | End: 2021-07-29 | Stop reason: SDUPTHER

## 2021-07-29 RX ORDER — MIDAZOLAM HYDROCHLORIDE 1 MG/ML
INJECTION INTRAMUSCULAR; INTRAVENOUS PRN
Status: DISCONTINUED | OUTPATIENT
Start: 2021-07-29 | End: 2021-07-29 | Stop reason: SDUPTHER

## 2021-07-29 RX ORDER — PROPOFOL 10 MG/ML
INJECTION, EMULSION INTRAVENOUS CONTINUOUS PRN
Status: DISCONTINUED | OUTPATIENT
Start: 2021-07-29 | End: 2021-07-29 | Stop reason: SDUPTHER

## 2021-07-29 RX ORDER — DIMENHYDRINATE 50 MG/1
50 TABLET ORAL ONCE
Status: DISCONTINUED | OUTPATIENT
Start: 2021-07-29 | End: 2021-07-29 | Stop reason: HOSPADM

## 2021-07-29 RX ORDER — DEXAMETHASONE SODIUM PHOSPHATE 4 MG/ML
4 INJECTION, SOLUTION INTRA-ARTICULAR; INTRALESIONAL; INTRAMUSCULAR; INTRAVENOUS; SOFT TISSUE
Status: DISCONTINUED | OUTPATIENT
Start: 2021-07-29 | End: 2021-07-29 | Stop reason: HOSPADM

## 2021-07-29 RX ADMIN — LABETALOL HYDROCHLORIDE 10 MG: 5 INJECTION, SOLUTION INTRAVENOUS at 07:14

## 2021-07-29 RX ADMIN — SODIUM CHLORIDE, POTASSIUM CHLORIDE, SODIUM LACTATE AND CALCIUM CHLORIDE: 600; 310; 30; 20 INJECTION, SOLUTION INTRAVENOUS at 07:36

## 2021-07-29 RX ADMIN — MIDAZOLAM 2 MG: 1 INJECTION INTRAMUSCULAR; INTRAVENOUS at 07:10

## 2021-07-29 RX ADMIN — SODIUM CHLORIDE, POTASSIUM CHLORIDE, SODIUM LACTATE AND CALCIUM CHLORIDE: 600; 310; 30; 20 INJECTION, SOLUTION INTRAVENOUS at 07:05

## 2021-07-29 RX ADMIN — Medication 150 MG: at 07:00

## 2021-07-29 RX ADMIN — FAMOTIDINE 20 MG: 10 INJECTION, SOLUTION INTRAVENOUS at 07:12

## 2021-07-29 RX ADMIN — PROPOFOL 125 MCG/KG/MIN: 10 INJECTION, EMULSION INTRAVENOUS at 07:36

## 2021-07-29 RX ADMIN — CEFAZOLIN 2000 MG: 10 INJECTION, POWDER, FOR SOLUTION INTRAVENOUS at 07:34

## 2021-07-29 NOTE — PROGRESS NOTES
Patient very agitated and anxious at this time. Caregiver at bedside and patient in cart with both side rails up.

## 2021-07-29 NOTE — PROGRESS NOTES
Discharge instructions given to patients caregiver. Caregiver verbalizes understanding and denies any questions at this time. Discharge Criteria    Inpatients must meet Criteria 1 through 7. All other patients are either YES or N/A. If a NO is chosen then Anesthesia or Surgeon must be notified. 1.  Minimum 30 minutes after last dose of sedative medication, minimum 120 minutes after last dose of reversal agent. Yes      2. Systolic BP stable within 20 mmHg for 30 minutes & systolic BP between 90 & 210 or within 10 mmHg of baseline. Yes      3. Pulse between 60 and 100 or within 10 bpm of baseline. Yes      4. Spontaneous respiratory rate >/= 10 per minute. Yes      5. SaO2 >/= 95 or  >/= baseline. Yes      6. Able to cough and swallow or return to baseline function. Yes      7. Alert and oriented or return to baseline mental status. Yes      8. Demonstrates controlled, coordinated movements, ambulates with steady gait, or return to baseline activity function. Yes      9. Minimal or no pain or nausea, or at a level tolerable and acceptable to patient. Yes      10. Takes and retains oral fluids as allowed. Yes      11. Procedural / perioperative site stable. Minimal or no bleeding. Yes          12. If GI endoscopy procedure, minimal or no abdominal distention or passing flatus. N/A      13. Written discharge instructions and emergency telephone number provided. Yes      14. Accompanied by a responsible adult.     Yes

## 2021-07-29 NOTE — ANESTHESIA PRE PROCEDURE
Department of Anesthesiology  Preprocedure Note       Name:  Diego Koehler   Age:  74 y.o.  :  1955                                          MRN:  [id number]         Date:  2021      Surgeon: Shoshana Milian):  India Khan DPM    Procedure: Procedure(s):  NAILBED EXCISION MATRIXECTOMY, HALLUX TOTAL AVULSION    Medications prior to admission:   Prior to Admission medications    Medication Sig Start Date End Date Taking? Authorizing Provider   Incontinence Supply Disposable (FQ PROTECTIVE UNDERWEAR) MISC USE AS DIRECTED FOR INCONTINENCE (319) *CALL PHARMACY FOR REFILLS*    SIZE SMALL/MEDIUM 21  Yes Attila Mckeon MD   cephALEXin [facility]) 500 MG capsule Take 1 capsule by mouth 4 times daily for 10 days 21 Yes Suni Love PA-C   sulfamethoxazole-trimethoprim (BACTRIM DS) 800-160 MG per tablet Take 1 tablet by mouth 2 times daily for 10 days 21 Yes Suni Love PA-C   UNABLE TO FIND Simply thickener to be used with nectar for aspiration monthly 21  Yes Attila Mckeon MD   loratadine (CLARITIN) 10 MG tablet Take 1 tablet by mouth daily 3/26/21  Yes Attila Mckeon MD   oxybutynin (DITROPAN-XL) 5 MG extended release tablet Take 1 tablet by mouth daily 3/19/21  Yes Attila Mckeon MD   omeprazole (PRILOSEC) 20 MG delayed release capsule Take 2 capsules by mouth Daily 3/5/21  Yes Attila Mckeon MD   [facility]) 7.5 MG tablet Take 1 tablet by mouth daily 3/5/21  Yes Attila Mckeon MD   OYSTER SHELL PO Take 500 mg by mouth 2 times daily   Yes Historical Provider, MD   QUEtiapine (SEROQUEL) 25 MG tablet Take 25 mg by mouth nightly    Yes Historical Provider, MD   sertraline (ZOLOFT) 50 MG tablet Take  by mouth daily.  Take 1 1/2 tablets at bedtime   Yes Historical Provider, MD   acetaminophen (ACETAMINOPHEN EXTRA STRENGTH) 500 MG tablet Take 2 tablets by mouth every 8 hours as needed for Pain or Fever 21   Attila Mckeon MD   chlorhexidine (PERIDEX) 0.12 % solution Use orally to rinse twice daily with cotton swab 3/10/21   Cheyenne Clifford MD   Calcium Carbonate-Vitamin D (OYSTER SHELL CALCIUM/D) 500-200 MG-UNIT TABS Take 1 tablet by mouth 2 times daily 3/5/21   Cheyenne Clifford MD   acetaminophen (V-R NON-ASPIRIN) 500 MG tablet Take 2 tablets by mouth every 8 hours 4/28/20   Cheyenne Clifford MD   Na Sulfate-K Sulfate-Mg Sulf (SUPREP BOWEL PREP KIT) 17.5-3.13-1.6 GM/177ML SOLN Take as directed 9/23/19   Nader Rendon MD   UNABLE TO FIND Zostavax/shingles vaccine.  10/13/17   Cheyenne Clifford MD   Disposable Gloves (VINYL GLOVES LARGE) MISC 1 PAIR AS DIRECTED QD-BID 6/28/17   Cheyenne Clifford MD   UNABLE TO FIND Zostavax 4/21/17   Cheyenne Clifford MD   neomycin-bacitracin-polymyxin (NEOSPORIN) [phone] ointment Apply topically 4 times daily prn to affected area(s) 8/30/16   Cheyenne Clifford MD       Current medications:    Current Facility-Administered Medications   Medication Dose Route Frequency Provider Last Rate Last Admin    ceFAZolin (ANCEF) 2000 mg in dextrose 5 % 100 mL IVPB  2,000 mg Intravenous Once Vikram Turcios DPM        acetaminophen (TYLENOL) tablet 650 mg  650 mg Oral Once Vikram Turcios DPM        dimenhyDRINATE (DRAMAMINE) tablet 50 mg  50 mg Oral Once Vikram Turcios DPM        lactated ringers infusion   Intravenous Continuous Samara Valery Turcios DPM        famotidine (PEPCID) injection 20 mg  20 mg Intravenous Once Paola Crabtree DPM         Facility-Administered Medications Ordered in Other Encounters   Medication Dose Route Frequency Provider Last Rate Last Admin    Ketamine (KETALAR) injection syringe   Intramuscular PRN URSZULA Jacobsen - CRNA   150 mg at 07/29/21 0700       Allergies:  No Known Allergies    Problem List:    Patient Active Problem List   Diagnosis Code    Epilepsy ([facility] [facility] G40.909    Autistic spectrum disorder F84.0    OCD (obsessive compulsive disorder) F42.9    Generalized nonconvulsive epilepsy ([facility] G40.309    Urinary incontinence R32    Gastroesophageal reflux disease K21.9    Allergic rhinitis with postnasal drip cough J30.9, R09.82    History of colon polyps Z86.010    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       Past Medical History:        Diagnosis Date    Allergic rhinitis     Autism     Epilepsy ([facility]     GERD (gastroesophageal reflux disease)     Hypertriglyceridemia     Mental retardation     Occupational circumstances     [employer]    Seizures ([facility]        Past Surgical History:        Procedure Laterality Date    COLONOSCOPY  2012    Polyps       Social History:    Social History     Tobacco Use    Smoking status: Never Smoker    Smokeless tobacco: Never Used   Substance Use Topics    Alcohol use: No     Alcohol/week: 0.0 standard drinks                                Counseling given: Not Answered      Vital Signs (Current):   Vitals:    07/29/21 0702   BP: (!) 197/115   Pulse: 96   Resp: 18   Temp: 36.7 °C (98 °F)   TempSrc: Temporal   SpO2: 97%                                              BP Readings from Last 3 Encounters:   07/29/21 (!) 197/115   07/26/21 (!) 137/115   10/22/20 138/88       NPO Status: Time of last liquid consumption: 1900                        Time of last solid consumption: 1900                        Date of last liquid consumption: 07/28/21                        Date of last solid food consumption: 07/28/21    BMI:   Wt Readings from Last 3 Encounters:   04/06/21 160 lb (72.6 kg)   10/22/20 152 lb (68.9 kg)   10/07/20 157 lb (71.2 kg)     There is no height or weight on file to calculate BMI.    CBC:   Lab Results   Component Value Date    WBC 7.8 04/28/2021    RBC 5.18 04/28/2021    RBC 4.08 11/02/2011    HGB 15.2 04/28/2021    HCT 44.7 04/28/2021    MCV 86.3 04/28/2021    RDW 13.2 04/28/2021     04/28/2021     11/02/2011       CMP:   Lab Results   Component Value Date     04/28/2021    K 3.8 04/28/2021     04/28/2021 CO2 25 04/28/2021    BUN 21 04/28/2021    CREATININE 0.96 04/28/2021    GFRAA >60 04/28/2021    LABGLOM >60 04/28/2021    GLUCOSE 105 04/28/2021    GLUCOSE 90 05/08/2012    PROT 7.5 12/08/2015    CALCIUM 10.0 04/28/2021    BILITOT 0.38 12/08/2015    ALKPHOS 71 12/08/2015    AST 27 12/08/2015    ALT 29 12/08/2015       POC Tests: No results for input(s): POCGLU, POCNA, POCK, POCCL, POCBUN, POCHEMO, POCHCT in the last 72 hours. Coags: No results found for: PROTIME, INR, APTT    HCG (If Applicable): No results found for: PREGTESTUR, PREGSERUM, HCG, HCGQUANT     ABGs: No results found for: PHART, PO2ART, ZBQ5SRR, LKR6XJY, BEART, F7GCULEK     Type & Screen (If Applicable):  No results found for: LABABO, LABRH    Drug/Infectious Status (If Applicable):  No results found for: HIV, HEPCAB    COVID-19 Screening (If Applicable):   Lab Results   Component Value Date    COVID19 Not Detected 04/23/2021           Anesthesia Evaluation  Patient summary reviewed and Nursing notes reviewed no history of anesthetic complications:   Airway: Mallampati: Unable to assess / NA  TM distance: >3 FB   Neck ROM: full  Mouth opening: > = 3 FB Dental:    (+) edentulous      Pulmonary:Negative Pulmonary ROS and normal exam                               Cardiovascular:  Exercise tolerance: good (>4 METS),   (+) hypertension:,         Rhythm: regular  Rate: normal           Beta Blocker:  Not on Beta Blocker         Neuro/Psych:   (+) seizures (last seizure over a year ago.):, psychiatric history:            GI/Hepatic/Renal:   (+) GERD: well controlled,           Endo/Other: Negative Endo/Other ROS                    Abdominal:             Vascular: negative vascular ROS. Other Findings:           Anesthesia Plan      general     ASA 3       Induction: intravenous. Anesthetic plan and risks discussed with patient and legal guardian. Plan discussed with DASIA.                 URSZULA Cortés - DASIA   7/29/2021

## 2021-07-29 NOTE — ANESTHESIA POSTPROCEDURE EVALUATION
Department of Anesthesiology  Postprocedure Note    Patient: David Powers  MRN: [id number]  YOB: 1955  Date of evaluation: 7/29/2021  Time:  8:53 AM     Procedure Summary     Date: 07/29/21 Room / Location: [address] [place] [address]    Anesthesia Start: 8301 Anesthesia Stop: 6803    Procedure: NAILBED EXCISION MATRIXECTOMY, HALLUX TOTAL AVULSION (Right ) Diagnosis: (INFECTED RIGHT HALLUX(PARONYCHIA) DYSTROPHIC NAIL)    Surgeons: Philip Garcia DPM Responsible Provider: URSZULA Vazquez CRNA    Anesthesia Type: general ASA Status: 3          Anesthesia Type: general    Mele Phase I: Mele Score: 10    Mele Phase II: Mele Score: 10    Last vitals: Reviewed and per EMR flowsheets.        Anesthesia Post Evaluation    Patient location during evaluation: PACU  Patient participation: complete - patient participated  Level of consciousness: awake and alert  Pain score: 0  Airway patency: patent  Nausea & Vomiting: no vomiting and no nausea  Complications: no  Cardiovascular status: blood pressure returned to baseline  Respiratory status: acceptable  Hydration status: stable

## 2021-10-28 ENCOUNTER — OFFICE VISIT (OUTPATIENT)
Dept: NEUROLOGY | Age: 66
End: 2021-10-28
Payer: MEDICARE

## 2021-10-28 VITALS
DIASTOLIC BLOOD PRESSURE: 72 MMHG | BODY MASS INDEX: 23.49 KG/M2 | HEIGHT: 65 IN | SYSTOLIC BLOOD PRESSURE: 143 MMHG | HEART RATE: 98 BPM | WEIGHT: 141 LBS

## 2021-10-28 DIAGNOSIS — F98.4 STEREOTYPY: ICD-10-CM

## 2021-10-28 DIAGNOSIS — G40.309 GENERALIZED NONCONVULSIVE EPILEPSY (HCC): Primary | ICD-10-CM

## 2021-10-28 DIAGNOSIS — F42.9 OBSESSIVE-COMPULSIVE DISORDER, UNSPECIFIED TYPE: ICD-10-CM

## 2021-10-28 DIAGNOSIS — F84.0 AUTISTIC SPECTRUM DISORDER: ICD-10-CM

## 2021-10-28 PROCEDURE — 3017F COLORECTAL CA SCREEN DOC REV: CPT | Performed by: PSYCHIATRY & NEUROLOGY

## 2021-10-28 PROCEDURE — G8427 DOCREV CUR MEDS BY ELIG CLIN: HCPCS | Performed by: PSYCHIATRY & NEUROLOGY

## 2021-10-28 PROCEDURE — 99213 OFFICE O/P EST LOW 20 MIN: CPT | Performed by: PSYCHIATRY & NEUROLOGY

## 2021-10-28 PROCEDURE — 1036F TOBACCO NON-USER: CPT | Performed by: PSYCHIATRY & NEUROLOGY

## 2021-10-28 PROCEDURE — 1123F ACP DISCUSS/DSCN MKR DOCD: CPT | Performed by: PSYCHIATRY & NEUROLOGY

## 2021-10-28 PROCEDURE — 4040F PNEUMOC VAC/ADMIN/RCVD: CPT | Performed by: PSYCHIATRY & NEUROLOGY

## 2021-10-28 PROCEDURE — G8484 FLU IMMUNIZE NO ADMIN: HCPCS | Performed by: PSYCHIATRY & NEUROLOGY

## 2021-10-28 PROCEDURE — G8420 CALC BMI NORM PARAMETERS: HCPCS | Performed by: PSYCHIATRY & NEUROLOGY

## 2021-10-28 NOTE — PROGRESS NOTES
NEUROLOGY FOLLOW-UP  Patient Name:  Gladys Velez  :   1955  Clinic Visit Date: 10/28/2021  Last visit date: 10/22/2020      I saw Mr. Gladys Velez in yearly follow-up in the office today in continuation of neurologic care. He is a 74 y.o.   male with hx of autistic spectrum disorder and seizures. Patient is brought to the clinic by Caregiver at [facility],  . Karen Noland stating that he has not seen Rica Judd with any seizure activity or staring spell, etc. He doesn't seem to be suffering from any pain. Patient has limited verbal output. He did not answer to any of the questionnaire regarding Headaches, neck pain, back pain, muscle pain, dizziness, chest pain, palpitations, etc.   Caregivers at [facility] did not witness him having any jerking activity or staring spells or tonic-clonic seizure activity, etc.  He used to be on Tegretol and it was slowly weaned down over several years. It was eventually stopped in . Patient has not had any ospitalizations toe wounds in July this year. Caregivers also stated that he does have episodic coughing while eating because he does eat in a \"very rapid fast rate\" with underlying mental retardation. But it has slowed down recently. He had barium swallow study in April this year and it was a normal swallow study with no evidence of aspiration. Of note; he has not had any witnessed the seizure activity since . CT head in  and EEG in 2010 were negative. His basic functional status remain unchanged. He feeds himself and he is dependent on rest of ADLs such as bathing, etc.      REVIEW OF SYSTEMS done by staff reviewed and pertinent negatives include absence of seizures and pertinent positives include speech difficulties associated with OCD, Autistic Spectrum Disorder.       Current Outpatient Medications on File Prior to Visit   Medication Sig Dispense Refill    chlorhexidine (PERIDEX) 0.12 % solution USE ORALLY TO RINSE TWICE DAILY WITH COTTON SWAB **MUST CALL PHARMACY FOR REFILL** [id number] 473 mL 1    oxybutynin (DITROPAN-XL) 5 MG extended release tablet Take 1 tablet by mouth daily 30 tablet 4    acetaminophen (ACETAMINOPHEN EXTRA STRENGTH) 500 MG tablet Take 2 tablets by mouth every 8 hours as needed for Pain or Fever 180 tablet 2    loratadine (CLARITIN) 10 MG tablet Take 1 tablet by mouth daily 90 tablet 2    omeprazole (PRILOSEC) 20 MG delayed release capsule Take 2 capsules by mouth Daily 180 capsule 2    Calcium Carbonate-Vitamin D (OYSTER SHELL CALCIUM/D) 500-200 MG-UNIT TABS Take 1 tablet by mouth 2 times daily 180 tablet 2    meloxicam (MOBIC) 7.5 MG tablet Take 1 tablet by mouth daily 90 tablet 2    acetaminophen (V-R NON-ASPIRIN) 500 MG tablet Take 2 tablets by mouth every 8 hours 60 tablet 3    OYSTER SHELL PO Take 500 mg by mouth 2 times daily      QUEtiapine (SEROQUEL) 25 MG tablet Take 25 mg by mouth nightly       neomycin-bacitracin-polymyxin (NEOSPORIN) 5-400-5000 ointment Apply topically 4 times daily prn to affected area(s) 28 g 3    sertraline (ZOLOFT) 50 MG tablet Take 75 mg by mouth daily       Incontinence Supply Disposable (FQ PROTECTIVE UNDERWEAR) MISC USE AS DIRECTED FOR INCONTINENCE (319) *CALL PHARMACY FOR REFILLS*    SIZE SMALL/MEDIUM 80 Bottle 5    UNABLE TO FIND Simply thickener to be used with nectar for aspiration monthly 1 box 5    Na Sulfate-K Sulfate-Mg Sulf (SUPREP BOWEL PREP KIT) 17.5-3.13-1.6 GM/177ML SOLN Take as directed 2 Bottle 0    UNABLE TO FIND Zostavax/shingles vaccine. 1 Act 0    Disposable Gloves (VINYL GLOVES LARGE) MISC 1 PAIR AS DIRECTED QD- each 5    UNABLE TO FIND Zostavax 1 Act 0     No current facility-administered medications on file prior to visit. Allergies: Keegan Del Toro has No Known Allergies.     Past Medical History:   Diagnosis Date    Allergic rhinitis     Autism     Epilepsy ([facility] [address] [address]     GERD (gastroesophageal reflux disease)     Hypertriglyceridemia  Mental retardation     Occupational circumstances     [employer]    Seizures [facility])        Past Surgical History:   Procedure Laterality Date    COLONOSCOPY  2012    Polyps    FINGER NAIL SURGERY Right 7/29/2021    NAILBED EXCISION MATRIXECTOMY, HALLUX TOTAL AVULSION performed by Mendel Fuelling, DPM at [address] [facility] Right 07/29/2021    NAILBED EXCISION MATRIXECTOMY, HALLUX TOTAL AVULSION (Right )- Dr Jojo Carreon History: Keegan Del Toro  reports that he has never smoked. He has never used smokeless tobacco. He reports that he does not drink alcohol and does not use drugs. History reviewed. No pertinent family history. On exam: vitals: Blood pressure (!) 143/72, pulse 98, height 5' 5\" (1.651 m), weight 141 lb (64 kg). Mr. Charisma Colon appears comfortable and with ongoing rocking movements and he does not seem to be in pain. He appears comfortable and alert and awake and oriented to name only. He does not follow commands. PERRLA and full EOMs noted. Face appears symmetric and tongue is midline. Palate is elevating symmetrically. Able to hear to finger rub equally well bilaterally. Moves all extremities spontaneously and purposefully. DTRs are symmetric and he withdraws to pinprick equally well. He is able to walk unassisted. He  has bilateral extensor plantar response. He is able to walk with out much support. He has bilateral extensor plantar response. Diagnostic data reviewed with the patient:   EEG (October 2013): This EEG performed during drowsiness and sleep did not demonstrate any ongoing electrographic seizure activity. But the study also demonstrated muscle artifacts during wakefulness. Clinical correlation is recommended. CT [facility](2012): Negative for bleed and any acute intracranial abnormalities.       CBC:     Lab Results   Component Value Date    WBC 7.8 04/28/2021    HGB 15.2 04/28/2021     04/28/2021      BMP:     Lab Results   Component Value Date     04/28/2021    K 3.8 04/28/2021    GLUCOSE 105 (H) 04/28/2021    CALCIUM 10.0 04/28/2021       Lab Results   Component Value Date    CBMZ 2.1 (L) 12/07/2015       Lab Results   Component Value Date    CHOL 110 04/28/2021    LDLCHOLESTEROL 51 04/28/2021    HDL 28 (L) 04/28/2021    TRIG 153 (H) 04/28/2021    ALT 29 12/08/2015    AST 27 12/08/2015    LABA1C 4.8 04/28/2021     Barium swallow study (4/28/21): Done with multiple consistencies; oral phase WNL; no evidence of gross laryngeal penetration/aspiration. Impression:  Modified barium swallow study without evidence of gross aspiration              Impression and Plan: Mr. Bina Perdomo is a 74 y.o. male with   Seizure disorder; has remained seizure-free for ~ 10 yrs  since 2011/2012  Prior CT head and EEG were unremarkable. No witnessed spells. Patient has been off of Tegretol for greater than 1 year. Occasional choking/coughing while eating; he eats in a \"very rapid fast rate\" with underlying mental retardation. But it has slowed down recently. He had barium swallow study in April this year and it was a normal swallow study with no evidence of aspiration. Speech difficulty, stereotypic rocking back-and-forth body movements likely related to underlying Tourette's syndrome/ comorbid OCD & comorbid autistic spectrum disorder: Under care of  Psychiatrist, Dr. Reid Romero. Follow-up in clinic on as-needed basis. This note was partially created using voice recognition software and is inherently subject to errors including those of syntax and \"sound alike\" substitutions which may escape proofreading. In such instances, original meaning may be extrapolated by contextual derivation.

## 2021-10-28 NOTE — PROGRESS NOTES
All items selected indicate a positive finding. Those items not selected are negative.   Constitutional [] Weight loss/gain   [] Fatigue  [] Fever/Chills   HEENT [] Hearing Loss  [] Visual Disturbance  [] Tinnitus  [] Eye pain   Respiratory [] Shortness of Breath  [x] Cough  [] Snoring   Cardiovascular [] Chest Pain  [] Palpitations  [] Lightheaded   GI [] Constipation  [] Diarrhea  [] Swallowing change  [] Nausea/vomiting    [] Urinary Frequency  [] Urinary Urgency   Musculoskeletal [] Neck pain  [] Back pain  [] Muscle pain  [] Restless legs   Dermatologic [] Skin changes   Neurologic [] Memory loss/confusion  [x] Seizures  [] Trouble walking or imbalance  [] Dizziness  [] Sleep disturbance  [] Weakness  [] Numbness  [] Tremors  [] Speech Difficulty  [] Headaches  [] Light Sensitivity  [] Sound Sensitivity   Endocrinology []Excessive thirst  []Excessive hunger   Psychiatric [] Anxiety/Depression  [] Hallucination   Allergy/immunology []Hives/environmental allergies   Hematologic/lymph [] Abnormal bleeding  [] Abnormal bruising

## 2022-05-09 ENCOUNTER — HOSPITAL ENCOUNTER (OUTPATIENT)
Age: 67
Discharge: HOME | End: 2022-05-09
Payer: MEDICARE

## 2022-05-09 LAB
ABSOLUTE EOS #: 0.11 K/UL (ref 0–0.44)
ABSOLUTE IMMATURE GRANULOCYTE: 0.04 K/UL (ref 0–0.3)
ABSOLUTE LYMPH #: 1.62 K/UL (ref 1.1–3.7)
ABSOLUTE MONO #: 0.49 K/UL (ref 0.1–1.2)
ANION GAP SERPL CALCULATED.3IONS-SCNC: 10 MMOL/L (ref 9–17)
BASOPHILS # BLD: 1 % (ref 0–2)
BASOPHILS ABSOLUTE: 0.05 K/UL (ref 0–0.2)
BUN BLDV-MCNC: 21 MG/DL (ref 8–23)
BUN/CREAT BLD: 27 (ref 9–20)
CALCIUM SERPL-MCNC: 9.4 MG/DL (ref 8.6–10.4)
CHLORIDE BLD-SCNC: 106 MMOL/L (ref 98–107)
CHOLESTEROL/HDL RATIO: 3.7
CHOLESTEROL: 110 MG/DL
CO2: 25 MMOL/L (ref 20–31)
CREAT SERPL-MCNC: 0.77 MG/DL (ref 0.7–1.2)
EOSINOPHILS RELATIVE PERCENT: 2 % (ref 1–4)
GFR AFRICAN AMERICAN: >60 ML/MIN
GFR NON-AFRICAN AMERICAN: >60 ML/MIN
GFR SERPL CREATININE-BSD FRML MDRD: ABNORMAL ML/MIN/{1.73_M2}
GFR SERPL CREATININE-BSD FRML MDRD: ABNORMAL ML/MIN/{1.73_M2}
GLUCOSE BLD-MCNC: 94 MG/DL (ref 70–99)
HCT VFR BLD CALC: 43 % (ref 40.7–50.3)
HDLC SERPL-MCNC: 30 MG/DL
HEMOGLOBIN: 14.4 G/DL (ref 13–17)
IMMATURE GRANULOCYTES: 1 %
LDL CHOLESTEROL: 59 MG/DL (ref 0–130)
LYMPHOCYTES # BLD: 25 % (ref 24–43)
MCH RBC QN AUTO: 30.4 PG (ref 25.2–33.5)
MCHC RBC AUTO-ENTMCNC: 33.5 G/DL (ref 28.4–34.8)
MCV RBC AUTO: 90.7 FL (ref 82.6–102.9)
MONOCYTES # BLD: 8 % (ref 3–12)
NRBC AUTOMATED: 0 PER 100 WBC
PDW BLD-RTO: 13.2 % (ref 11.8–14.4)
PLATELET # BLD: 198 K/UL (ref 138–453)
PMV BLD AUTO: 11 FL (ref 8.1–13.5)
POTASSIUM SERPL-SCNC: 3.7 MMOL/L (ref 3.7–5.3)
RBC # BLD: 4.74 M/UL (ref 4.21–5.77)
SEG NEUTROPHILS: 63 % (ref 36–65)
SEGMENTED NEUTROPHILS ABSOLUTE COUNT: 4.26 K/UL (ref 1.5–8.1)
SODIUM BLD-SCNC: 141 MMOL/L (ref 135–144)
TRIGL SERPL-MCNC: 104 MG/DL
WBC # BLD: 6.6 K/UL (ref 3.5–11.3)

## 2022-05-09 PROCEDURE — 83036 HEMOGLOBIN GLYCOSYLATED A1C: CPT

## 2022-05-09 PROCEDURE — 85025 COMPLETE CBC W/AUTO DIFF WBC: CPT

## 2022-05-09 PROCEDURE — 80048 BASIC METABOLIC PNL TOTAL CA: CPT

## 2022-05-09 PROCEDURE — 36415 COLL VENOUS BLD VENIPUNCTURE: CPT

## 2022-05-09 PROCEDURE — 80061 LIPID PANEL: CPT

## 2022-05-10 LAB
ESTIMATED AVERAGE GLUCOSE: 91 MG/DL
HBA1C MFR BLD: 4.8 % (ref 4–6)

## 2023-07-11 ENCOUNTER — HOSPITAL ENCOUNTER (OUTPATIENT)
Age: 68
Discharge: HOME | End: 2023-07-11
Payer: MEDICARE

## 2023-07-11 DIAGNOSIS — G40.309 GENERALIZED NONCONVULSIVE EPILEPSY (HCC): ICD-10-CM

## 2023-07-11 DIAGNOSIS — Z12.5 SCREENING FOR PROSTATE CANCER: ICD-10-CM

## 2023-07-11 LAB
ANION GAP SERPL CALCULATED.3IONS-SCNC: 11 MMOL/L (ref 9–17)
BUN SERPL-MCNC: 20 MG/DL (ref 8–23)
BUN/CREAT SERPL: 22 (ref 9–20)
CALCIUM SERPL-MCNC: 9.4 MG/DL (ref 8.6–10.4)
CHLORIDE SERPL-SCNC: 110 MMOL/L (ref 98–107)
CO2 SERPL-SCNC: 24 MMOL/L (ref 20–31)
CREAT SERPL-MCNC: 0.9 MG/DL (ref 0.7–1.2)
GFR SERPL CREATININE-BSD FRML MDRD: >60 ML/MIN/1.73M2
GLUCOSE P FAST SERPL-MCNC: 103 MG/DL (ref 70–99)
POTASSIUM SERPL-SCNC: 4 MMOL/L (ref 3.7–5.3)
PSA SERPL-MCNC: 0.56 NG/ML
SODIUM SERPL-SCNC: 145 MMOL/L (ref 135–144)

## 2023-07-11 PROCEDURE — 80048 BASIC METABOLIC PNL TOTAL CA: CPT

## 2023-07-11 PROCEDURE — G0103 PSA SCREENING: HCPCS

## 2023-07-11 PROCEDURE — 36415 COLL VENOUS BLD VENIPUNCTURE: CPT

## 2024-01-31 ENCOUNTER — TELEPHONE (OUTPATIENT)
Dept: GASTROENTEROLOGY | Age: 69
End: 2024-01-31

## 2024-04-09 ENCOUNTER — OFFICE VISIT (OUTPATIENT)
Dept: GASTROENTEROLOGY | Age: 69
End: 2024-04-09

## 2024-04-09 ENCOUNTER — TELEPHONE (OUTPATIENT)
Dept: GASTROENTEROLOGY | Age: 69
End: 2024-04-09

## 2024-04-09 VITALS
SYSTOLIC BLOOD PRESSURE: 130 MMHG | WEIGHT: 147 LBS | DIASTOLIC BLOOD PRESSURE: 84 MMHG | BODY MASS INDEX: 24.46 KG/M2 | HEART RATE: 65 BPM | RESPIRATION RATE: 18 BRPM | OXYGEN SATURATION: 98 %

## 2024-04-09 DIAGNOSIS — Z12.11 COLON CANCER SCREENING: Primary | ICD-10-CM

## 2024-04-09 DIAGNOSIS — Z01.818 PRE-OP TESTING: ICD-10-CM

## 2024-04-09 NOTE — TELEPHONE ENCOUNTER
Carrington Mulligan     1955        male    [address] [facility] [address]         [id number]           Legal Guardian Yes   If yes, Name: Apsi, Brad Gillig   Skilled Facility Yes     If yes, Name:                                             Home Phone: [phone]         Cell Phone:    Telephone Information:   Mobile [phone]                                           Surgeon: Dr. Lui Surgery Date: 08/29                    Time:     Procedure: Colonoscopy   Duration:    Diagnosis: Screening Colonoscopy   CPT Codes:       Important Medical History:  In Epic    First Assistant no  Special Inst/Equip/Implants: Regular    Nickel allergy: No      Latex Allergy: No         Cardiac Device:  No  If yes, need most recent pacemaker interrogation from Cardiologist:  Type of pacemaker:    Anesthesia:    MAC                       Admission Type:  Same Day                        Admit Prior to Day of Surgery: No    Case Location:  Ambulatory            Preadmission Testing:  Phone Call             PAT Date and Time: TBD    Need Preop Cardiac Clearance: No  Need Pre-op/Medical Clearance: No    Does Patient have Cardiologist/physician? Name of Physician:        Special Needs Communication:  Adelina Lift  no          needed no            Does patient sign for self  no (OSEII: Brad Gillig) *form sent for signature

## 2024-04-09 NOTE — PROGRESS NOTES
K 4.0 07/11/2023     (H) 07/11/2023    CO2 24 07/11/2023    BUN 20 07/11/2023    CREATININE 0.9 07/11/2023    GLUCOSE 94 05/09/2022    CALCIUM 9.4 07/11/2023    PROT 7.5 12/08/2015    LABALBU 3.9 12/08/2015    BILITOT 0.38 12/08/2015    ALKPHOS 71 12/08/2015    AST 27 12/08/2015    ALT 29 12/08/2015    LABGLOM >60 07/11/2023    GFRAA >60 05/09/2022    AGRATIO 1.1 12/08/2015        No results found for: \"INR\", \"PROTIME\"    CT Result (most recent):  CT CHEST ABDOMEN PELVIS W CONTRAST 11/18/2016    Narrative  FINAL REPORT    EXAM:  CT CHEST ABDOMEN PELVIS W CONTRAST    HISTORY:  elevated white count    TECHNIQUE:  CT abdomen and pelvis with  intravenous contrast    CT chest with intravenous contrast    PRIORS:  None.    FINDINGS:  CT chest    No evidence for pathologic mediastinal lymph node enlargement. Heart and great vessels appear unremarkable.    No focal pulmonary infiltrate is identified. No pleural fluid collection seen    CT abdomen pelvis.    No focal abnormality identified within the liver parenchyma.  The spleen demonstrates normal size and attenuation.    No pancreatic abnormalities seen.    There large bilateral renal cysts mid to lower right kidney there are 2 cysts present the largest measuring 8.4 x 7.6 centimeters. These appear of uniform low-density and are nonenhancing.    Within the left kidney are 5 cysts identified. The largest is seen at the upper pole and measures 7.5 x 7.5 centimeters. These are uniform low-density and nonenhancing.    The kidneys demonstrate symmetric contrast enhancement.  No evidence of hydronephrosis.    The adrenal glands are unremarkable    Abdominal aorta is normal in caliber.    No pathologically enlarged lymph nodes are identified. No signs of free fluid or free air    No evidence of small bowel dilatation.    Colon is nondistended. No pericolonic inflammatory change. The appendix is identified and is unremarkable.    Extensive spondylosis noted thoracic and

## 2024-06-12 ENCOUNTER — TELEPHONE (OUTPATIENT)
Dept: GASTROENTEROLOGY | Age: 69
End: 2024-06-12

## 2024-06-12 NOTE — TELEPHONE ENCOUNTER
Patient is MRDD. Caretaker wanted to know if they could have patient admitted to [facility] on 8/28/24 for prep administration prior to his colonoscopy on 8/29/24.

## 2024-06-19 NOTE — TELEPHONE ENCOUNTER
LVM to patient caregiver in order to obtain OP and pathology from last colonoscopy in 2012, waiting for return call back.

## 2024-08-22 ENCOUNTER — TELEPHONE (OUTPATIENT)
Dept: GASTROENTEROLOGY | Age: 69
End: 2024-08-22

## 2024-08-22 NOTE — TELEPHONE ENCOUNTER
Call placed to group home, DEN left with Ana Lilia requesting retrun call to advise:  Call attempt made to reach patient to make aware of need for cxl. of procedure on 8/29/24 for colonoscopy procedure d/t Dr. Lui out on medical LENI. [facility] left requesting return call to GI office to discuss reschedule options.

## 2024-09-19 ENCOUNTER — TELEPHONE (OUTPATIENT)
Dept: GASTROENTEROLOGY | Age: 69
End: 2024-09-19

## 2024-09-23 ENCOUNTER — TELEPHONE (OUTPATIENT)
Dept: SURGERY | Age: 69
End: 2024-09-23

## 2024-10-04 ENCOUNTER — HOSPITAL ENCOUNTER (OUTPATIENT)
Age: 69
Discharge: HOME | End: 2024-10-04
Payer: MEDICARE

## 2024-10-04 DIAGNOSIS — R63.0 LOSS OF APPETITE: ICD-10-CM

## 2024-10-04 LAB
ANION GAP SERPL CALCULATED.3IONS-SCNC: 9 MMOL/L (ref 9–16)
BASOPHILS # BLD: 0.06 K/UL (ref 0–0.2)
BASOPHILS NFR BLD: 1 % (ref 0–2)
BUN SERPL-MCNC: 20 MG/DL (ref 8–23)
BUN/CREAT SERPL: 22 (ref 9–20)
CALCIUM SERPL-MCNC: 9.7 MG/DL (ref 8.6–10.4)
CHLORIDE SERPL-SCNC: 104 MMOL/L (ref 98–107)
CO2 SERPL-SCNC: 27 MMOL/L (ref 20–31)
CREAT SERPL-MCNC: 0.9 MG/DL (ref 0.7–1.2)
EOSINOPHIL # BLD: 0.17 K/UL (ref 0–0.44)
EOSINOPHILS RELATIVE PERCENT: 2 % (ref 1–4)
ERYTHROCYTE [DISTWIDTH] IN BLOOD BY AUTOMATED COUNT: 12.9 % (ref 11.8–14.4)
GFR, ESTIMATED: 89 ML/MIN/1.73M2
GLUCOSE SERPL-MCNC: 92 MG/DL (ref 74–99)
HCT VFR BLD AUTO: 43 % (ref 40.7–50.3)
HGB BLD-MCNC: 15.2 G/DL (ref 13–17)
IMM GRANULOCYTES # BLD AUTO: 0.04 K/UL (ref 0–0.3)
IMM GRANULOCYTES NFR BLD: 1 %
LYMPHOCYTES NFR BLD: 1.49 K/UL (ref 1.1–3.7)
LYMPHOCYTES RELATIVE PERCENT: 21 % (ref 24–43)
MCH RBC QN AUTO: 30.5 PG (ref 25.2–33.5)
MCHC RBC AUTO-ENTMCNC: 35.3 G/DL (ref 28.4–34.8)
MCV RBC AUTO: 86.3 FL (ref 82.6–102.9)
MONOCYTES NFR BLD: 0.66 K/UL (ref 0.1–1.2)
MONOCYTES NFR BLD: 9 % (ref 3–12)
NEUTROPHILS NFR BLD: 66 % (ref 36–65)
NEUTS SEG NFR BLD: 4.77 K/UL (ref 1.5–8.1)
NRBC BLD-RTO: 0 PER 100 WBC
PLATELET # BLD AUTO: 212 K/UL (ref 138–453)
PMV BLD AUTO: 11.1 FL (ref 8.1–13.5)
POTASSIUM SERPL-SCNC: 4.1 MMOL/L (ref 3.7–5.3)
RBC # BLD AUTO: 4.98 M/UL (ref 4.21–5.77)
SODIUM SERPL-SCNC: 140 MMOL/L (ref 136–145)
WBC OTHER # BLD: 7.2 K/UL (ref 3.5–11.3)

## 2024-10-04 PROCEDURE — 85025 COMPLETE CBC W/AUTO DIFF WBC: CPT

## 2024-10-04 PROCEDURE — 80048 BASIC METABOLIC PNL TOTAL CA: CPT

## 2024-10-04 PROCEDURE — 36415 COLL VENOUS BLD VENIPUNCTURE: CPT

## 2024-10-08 ENCOUNTER — HOSPITAL ENCOUNTER (OUTPATIENT)
Age: 69
Setting detail: SPECIMEN
Discharge: HOME | End: 2024-10-08
Payer: MEDICARE

## 2024-10-08 LAB
BACTERIA URNS QL MICRO: ABNORMAL
BILIRUB UR QL STRIP: NEGATIVE
CLARITY UR: CLEAR
COLOR UR: YELLOW
CRYSTALS URNS MICRO: ABNORMAL /HPF
EPI CELLS #/AREA URNS HPF: ABNORMAL /HPF (ref 0–5)
GLUCOSE UR STRIP-MCNC: NEGATIVE MG/DL
HGB UR QL STRIP.AUTO: NEGATIVE
KETONES UR STRIP-MCNC: NEGATIVE MG/DL
LEUKOCYTE ESTERASE UR QL STRIP: NEGATIVE
NITRITE UR QL STRIP: NEGATIVE
PH UR STRIP: 6 [PH] (ref 5–9)
PROT UR STRIP-MCNC: NEGATIVE MG/DL
RBC #/AREA URNS HPF: ABNORMAL /HPF (ref 0–2)
SP GR UR STRIP: >1.03 (ref 1.01–1.02)
UROBILINOGEN UR STRIP-ACNC: NORMAL EU/DL (ref 0–1)
WBC #/AREA URNS HPF: ABNORMAL /HPF (ref 0–5)

## 2024-10-08 PROCEDURE — 81001 URINALYSIS AUTO W/SCOPE: CPT

## 2025-01-29 ENCOUNTER — HOSPITAL ENCOUNTER (OUTPATIENT)
Age: 70
Discharge: HOME | End: 2025-01-29
Attending: INTERNAL MEDICINE
Payer: MEDICARE

## 2025-01-29 ENCOUNTER — HOSPITAL ENCOUNTER (OUTPATIENT)
Dept: CT IMAGING | Age: 70
Discharge: HOME | End: 2025-01-31
Attending: INTERNAL MEDICINE
Payer: MEDICARE

## 2025-01-29 DIAGNOSIS — R19.7 DIARRHEA, UNSPECIFIED TYPE: ICD-10-CM

## 2025-01-29 DIAGNOSIS — R10.84 GENERALIZED ABDOMINAL PAIN: ICD-10-CM

## 2025-01-29 LAB
ANION GAP SERPL CALCULATED.3IONS-SCNC: 8 MMOL/L (ref 9–16)
BASOPHILS # BLD: 0.07 K/UL (ref 0–0.2)
BASOPHILS NFR BLD: 1 % (ref 0–2)
BUN SERPL-MCNC: 35 MG/DL (ref 8–23)
BUN/CREAT SERPL: 39 (ref 9–20)
CALCIUM SERPL-MCNC: 9.6 MG/DL (ref 8.6–10.4)
CHLORIDE SERPL-SCNC: 110 MMOL/L (ref 98–107)
CO2 SERPL-SCNC: 26 MMOL/L (ref 20–31)
CREAT SERPL-MCNC: 0.9 MG/DL (ref 0.7–1.2)
EOSINOPHIL # BLD: 0.31 K/UL (ref 0–0.44)
EOSINOPHILS RELATIVE PERCENT: 4 % (ref 1–4)
ERYTHROCYTE [DISTWIDTH] IN BLOOD BY AUTOMATED COUNT: 13.5 % (ref 11.8–14.4)
GFR, ESTIMATED: >90 ML/MIN/1.73M2
GLUCOSE SERPL-MCNC: 93 MG/DL (ref 74–99)
HCT VFR BLD AUTO: 39.1 % (ref 40.7–50.3)
HGB BLD-MCNC: 12.9 G/DL (ref 13–17)
IMM GRANULOCYTES # BLD AUTO: <0.03 K/UL (ref 0–0.3)
IMM GRANULOCYTES NFR BLD: 0 %
LYMPHOCYTES NFR BLD: 1.61 K/UL (ref 1.1–3.7)
LYMPHOCYTES RELATIVE PERCENT: 20 % (ref 24–43)
MCH RBC QN AUTO: 29.9 PG (ref 25.2–33.5)
MCHC RBC AUTO-ENTMCNC: 33 G/DL (ref 28.4–34.8)
MCV RBC AUTO: 90.5 FL (ref 82.6–102.9)
MONOCYTES NFR BLD: 0.66 K/UL (ref 0.1–1.2)
MONOCYTES NFR BLD: 8 % (ref 3–12)
NEUTROPHILS NFR BLD: 67 % (ref 36–65)
NEUTS SEG NFR BLD: 5.48 K/UL (ref 1.5–8.1)
NRBC BLD-RTO: 0 PER 100 WBC
PLATELET # BLD AUTO: 210 K/UL (ref 138–453)
PMV BLD AUTO: 11 FL (ref 8.1–13.5)
POTASSIUM SERPL-SCNC: 4.2 MMOL/L (ref 3.7–5.3)
RBC # BLD AUTO: 4.32 M/UL (ref 4.21–5.77)
SODIUM SERPL-SCNC: 144 MMOL/L (ref 136–145)
WBC OTHER # BLD: 8.2 K/UL (ref 3.5–11.3)

## 2025-01-29 PROCEDURE — 85025 COMPLETE CBC W/AUTO DIFF WBC: CPT

## 2025-01-29 PROCEDURE — 36415 COLL VENOUS BLD VENIPUNCTURE: CPT

## 2025-01-29 PROCEDURE — 80048 BASIC METABOLIC PNL TOTAL CA: CPT

## 2025-01-29 RX ORDER — IOPAMIDOL 755 MG/ML
75 INJECTION, SOLUTION INTRAVASCULAR
Status: DISCONTINUED | OUTPATIENT
Start: 2025-01-29 | End: 2025-01-29

## 2025-04-12 ENCOUNTER — APPOINTMENT (OUTPATIENT)
Dept: GENERAL RADIOLOGY | Age: 70
DRG: 690 | End: 2025-04-12
Payer: MEDICARE

## 2025-04-12 ENCOUNTER — HOSPITAL ENCOUNTER (INPATIENT)
Age: 70
LOS: 2 days | Discharge: HOME | DRG: 690 | End: 2025-04-14
Attending: EMERGENCY MEDICINE | Admitting: INTERNAL MEDICINE
Payer: MEDICARE

## 2025-04-12 DIAGNOSIS — N39.0 URINARY TRACT INFECTION WITHOUT HEMATURIA, SITE UNSPECIFIED: ICD-10-CM

## 2025-04-12 DIAGNOSIS — R53.1 GENERAL WEAKNESS: Primary | ICD-10-CM

## 2025-04-12 LAB
ALBUMIN SERPL-MCNC: 4.4 G/DL (ref 3.5–5.2)
ALBUMIN/GLOB SERPL: 1.1 {RATIO} (ref 1–2.5)
ALP SERPL-CCNC: 100 U/L (ref 40–129)
ALT SERPL-CCNC: 22 U/L (ref 10–50)
ANION GAP SERPL CALCULATED.3IONS-SCNC: 12 MMOL/L (ref 9–16)
AST SERPL-CCNC: 31 U/L (ref 10–50)
BACTERIA URNS QL MICRO: ABNORMAL
BASOPHILS # BLD: 0.1 K/UL (ref 0–0.2)
BASOPHILS NFR BLD: 1 % (ref 0–2)
BILIRUB SERPL-MCNC: 0.3 MG/DL (ref 0–1.2)
BILIRUB UR QL STRIP: NEGATIVE
BUN SERPL-MCNC: 26 MG/DL (ref 8–23)
BUN/CREAT SERPL: 26 (ref 9–20)
CALCIUM SERPL-MCNC: 9.7 MG/DL (ref 8.6–10.4)
CASTS #/AREA URNS LPF: ABNORMAL /LPF
CHLORIDE SERPL-SCNC: 109 MMOL/L (ref 98–107)
CLARITY UR: CLEAR
CO2 SERPL-SCNC: 23 MMOL/L (ref 20–31)
COLOR UR: YELLOW
CREAT SERPL-MCNC: 1 MG/DL (ref 0.7–1.2)
EOSINOPHIL # BLD: 0.15 K/UL (ref 0–0.44)
EOSINOPHILS RELATIVE PERCENT: 2 % (ref 1–4)
EPI CELLS #/AREA URNS HPF: ABNORMAL /HPF (ref 0–5)
ERYTHROCYTE [DISTWIDTH] IN BLOOD BY AUTOMATED COUNT: 13.7 % (ref 11.8–14.4)
GFR, ESTIMATED: 79 ML/MIN/1.73M2
GLUCOSE SERPL-MCNC: 137 MG/DL (ref 74–99)
GLUCOSE UR STRIP-MCNC: NEGATIVE MG/DL
HCT VFR BLD AUTO: 39.4 % (ref 40.7–50.3)
HGB BLD-MCNC: 13.6 G/DL (ref 13–17)
HGB UR QL STRIP.AUTO: ABNORMAL
IMM GRANULOCYTES # BLD AUTO: 0.05 K/UL (ref 0–0.3)
IMM GRANULOCYTES NFR BLD: 1 %
KETONES UR STRIP-MCNC: NEGATIVE MG/DL
LACTATE BLDV-SCNC: 1.8 MMOL/L (ref 0.5–2.2)
LEUKOCYTE ESTERASE UR QL STRIP: ABNORMAL
LYMPHOCYTES NFR BLD: 1.55 K/UL (ref 1.1–3.7)
LYMPHOCYTES RELATIVE PERCENT: 15 % (ref 24–43)
MCH RBC QN AUTO: 29.6 PG (ref 25.2–33.5)
MCHC RBC AUTO-ENTMCNC: 34.5 G/DL (ref 28.4–34.8)
MCV RBC AUTO: 85.8 FL (ref 82.6–102.9)
MONOCYTES NFR BLD: 0.64 K/UL (ref 0.1–1.2)
MONOCYTES NFR BLD: 6 % (ref 3–12)
NEUTROPHILS NFR BLD: 75 % (ref 36–65)
NEUTS SEG NFR BLD: 7.76 K/UL (ref 1.5–8.1)
NITRITE UR QL STRIP: POSITIVE
NRBC BLD-RTO: 0 PER 100 WBC
PH UR STRIP: 6 [PH] (ref 5–9)
PLATELET # BLD AUTO: 241 K/UL (ref 138–453)
PMV BLD AUTO: 10.4 FL (ref 8.1–13.5)
POTASSIUM SERPL-SCNC: 3.9 MMOL/L (ref 3.7–5.3)
PROT SERPL-MCNC: 8.5 G/DL (ref 6.6–8.7)
PROT UR STRIP-MCNC: ABNORMAL MG/DL
RBC # BLD AUTO: 4.59 M/UL (ref 4.21–5.77)
RBC #/AREA URNS HPF: ABNORMAL /HPF (ref 0–2)
SODIUM SERPL-SCNC: 144 MMOL/L (ref 136–145)
SP GR UR STRIP: 1.02 (ref 1.01–1.02)
TROPONIN I SERPL HS-MCNC: 14 NG/L (ref 0–22)
UROBILINOGEN UR STRIP-ACNC: NORMAL EU/DL (ref 0–1)
WBC #/AREA URNS HPF: ABNORMAL /HPF (ref 0–5)
WBC OTHER # BLD: 10.3 K/UL (ref 3.5–11.3)

## 2025-04-12 PROCEDURE — 81001 URINALYSIS AUTO W/SCOPE: CPT

## 2025-04-12 PROCEDURE — 36415 COLL VENOUS BLD VENIPUNCTURE: CPT

## 2025-04-12 PROCEDURE — 93005 ELECTROCARDIOGRAM TRACING: CPT | Performed by: PHYSICIAN ASSISTANT

## 2025-04-12 PROCEDURE — 80053 COMPREHEN METABOLIC PANEL: CPT

## 2025-04-12 PROCEDURE — 99285 EMERGENCY DEPT VISIT HI MDM: CPT

## 2025-04-12 PROCEDURE — 6360000002 HC RX W HCPCS: Performed by: INTERNAL MEDICINE

## 2025-04-12 PROCEDURE — 6360000002 HC RX W HCPCS: Performed by: PHYSICIAN ASSISTANT

## 2025-04-12 PROCEDURE — 2580000003 HC RX 258: Performed by: PHYSICIAN ASSISTANT

## 2025-04-12 PROCEDURE — 83605 ASSAY OF LACTIC ACID: CPT

## 2025-04-12 PROCEDURE — 87186 SC STD MICRODIL/AGAR DIL: CPT

## 2025-04-12 PROCEDURE — 84484 ASSAY OF TROPONIN QUANT: CPT

## 2025-04-12 PROCEDURE — 2500000003 HC RX 250 WO HCPCS: Performed by: PHYSICIAN ASSISTANT

## 2025-04-12 PROCEDURE — 1200000000 HC SEMI PRIVATE

## 2025-04-12 PROCEDURE — 87086 URINE CULTURE/COLONY COUNT: CPT

## 2025-04-12 PROCEDURE — 96375 TX/PRO/DX INJ NEW DRUG ADDON: CPT

## 2025-04-12 PROCEDURE — 71045 X-RAY EXAM CHEST 1 VIEW: CPT

## 2025-04-12 PROCEDURE — 87040 BLOOD CULTURE FOR BACTERIA: CPT

## 2025-04-12 PROCEDURE — 2500000003 HC RX 250 WO HCPCS: Performed by: INTERNAL MEDICINE

## 2025-04-12 PROCEDURE — 94761 N-INVAS EAR/PLS OXIMETRY MLT: CPT

## 2025-04-12 PROCEDURE — 85025 COMPLETE CBC W/AUTO DIFF WBC: CPT

## 2025-04-12 PROCEDURE — 87088 URINE BACTERIA CULTURE: CPT

## 2025-04-12 RX ORDER — CHLORHEXIDINE GLUCONATE ORAL RINSE 1.2 MG/ML
15 SOLUTION DENTAL 2 TIMES DAILY
Status: DISCONTINUED | OUTPATIENT
Start: 2025-04-12 | End: 2025-04-14 | Stop reason: HOSPADM

## 2025-04-12 RX ORDER — SODIUM CHLORIDE 0.9 % (FLUSH) 0.9 %
10 SYRINGE (ML) INJECTION PRN
Status: DISCONTINUED | OUTPATIENT
Start: 2025-04-12 | End: 2025-04-14 | Stop reason: HOSPADM

## 2025-04-12 RX ORDER — 0.9 % SODIUM CHLORIDE 0.9 %
800 INTRAVENOUS SOLUTION INTRAVENOUS ONCE
Status: COMPLETED | OUTPATIENT
Start: 2025-04-12 | End: 2025-04-12

## 2025-04-12 RX ORDER — PSEUDOEPHED/ACETAMINOPH/DIPHEN 30MG-500MG
500 TABLET ORAL EVERY 8 HOURS PRN
Status: DISCONTINUED | OUTPATIENT
Start: 2025-04-12 | End: 2025-04-12

## 2025-04-12 RX ORDER — PSYLLIUM HUSK 0.4 G
400 CAPSULE ORAL 2 TIMES DAILY
COMMUNITY

## 2025-04-12 RX ORDER — QUETIAPINE FUMARATE 25 MG/1
25 TABLET, FILM COATED ORAL NIGHTLY
Status: DISCONTINUED | OUTPATIENT
Start: 2025-04-12 | End: 2025-04-14 | Stop reason: HOSPADM

## 2025-04-12 RX ORDER — MAGNESIUM SULFATE IN WATER 40 MG/ML
2000 INJECTION, SOLUTION INTRAVENOUS PRN
Status: DISCONTINUED | OUTPATIENT
Start: 2025-04-12 | End: 2025-04-14 | Stop reason: HOSPADM

## 2025-04-12 RX ORDER — LORAZEPAM 2 MG/ML
1 INJECTION INTRAMUSCULAR ONCE
Status: COMPLETED | OUTPATIENT
Start: 2025-04-12 | End: 2025-04-12

## 2025-04-12 RX ORDER — ONDANSETRON 2 MG/ML
4 INJECTION INTRAMUSCULAR; INTRAVENOUS EVERY 6 HOURS PRN
Status: DISCONTINUED | OUTPATIENT
Start: 2025-04-12 | End: 2025-04-14 | Stop reason: HOSPADM

## 2025-04-12 RX ORDER — MIRTAZAPINE 15 MG/1
15 TABLET, FILM COATED ORAL NIGHTLY
Status: DISCONTINUED | OUTPATIENT
Start: 2025-04-12 | End: 2025-04-14 | Stop reason: HOSPADM

## 2025-04-12 RX ORDER — POTASSIUM CHLORIDE 1500 MG/1
40 TABLET, EXTENDED RELEASE ORAL PRN
Status: DISCONTINUED | OUTPATIENT
Start: 2025-04-12 | End: 2025-04-14 | Stop reason: HOSPADM

## 2025-04-12 RX ORDER — SERTRALINE HYDROCHLORIDE 100 MG/1
100 TABLET, FILM COATED ORAL DAILY
Status: DISCONTINUED | OUTPATIENT
Start: 2025-04-12 | End: 2025-04-14 | Stop reason: HOSPADM

## 2025-04-12 RX ORDER — POLYETHYLENE GLYCOL 3350 17 G/17G
17 POWDER, FOR SOLUTION ORAL DAILY PRN
Status: DISCONTINUED | OUTPATIENT
Start: 2025-04-12 | End: 2025-04-14 | Stop reason: HOSPADM

## 2025-04-12 RX ORDER — OXYBUTYNIN CHLORIDE 5 MG/1
5 TABLET, EXTENDED RELEASE ORAL ONCE
Status: DISCONTINUED | OUTPATIENT
Start: 2025-04-12 | End: 2025-04-14 | Stop reason: HOSPADM

## 2025-04-12 RX ORDER — CETIRIZINE HYDROCHLORIDE 10 MG/1
10 TABLET ORAL DAILY
Status: DISCONTINUED | OUTPATIENT
Start: 2025-04-13 | End: 2025-04-14 | Stop reason: HOSPADM

## 2025-04-12 RX ORDER — SODIUM CHLORIDE 9 MG/ML
INJECTION, SOLUTION INTRAVENOUS PRN
Status: DISCONTINUED | OUTPATIENT
Start: 2025-04-12 | End: 2025-04-14 | Stop reason: HOSPADM

## 2025-04-12 RX ORDER — ACETAMINOPHEN 650 MG/1
650 SUPPOSITORY RECTAL EVERY 6 HOURS PRN
Status: DISCONTINUED | OUTPATIENT
Start: 2025-04-12 | End: 2025-04-14 | Stop reason: HOSPADM

## 2025-04-12 RX ORDER — ACETAMINOPHEN 325 MG/1
650 TABLET ORAL EVERY 6 HOURS PRN
Status: DISCONTINUED | OUTPATIENT
Start: 2025-04-12 | End: 2025-04-14 | Stop reason: HOSPADM

## 2025-04-12 RX ORDER — 0.9 % SODIUM CHLORIDE 0.9 %
1000 INTRAVENOUS SOLUTION INTRAVENOUS ONCE
Status: COMPLETED | OUTPATIENT
Start: 2025-04-12 | End: 2025-04-12

## 2025-04-12 RX ORDER — ENOXAPARIN SODIUM 100 MG/ML
40 INJECTION SUBCUTANEOUS DAILY
Status: DISCONTINUED | OUTPATIENT
Start: 2025-04-12 | End: 2025-04-14 | Stop reason: HOSPADM

## 2025-04-12 RX ORDER — SODIUM CHLORIDE 0.9 % (FLUSH) 0.9 %
5-40 SYRINGE (ML) INJECTION EVERY 12 HOURS SCHEDULED
Status: DISCONTINUED | OUTPATIENT
Start: 2025-04-12 | End: 2025-04-14 | Stop reason: HOSPADM

## 2025-04-12 RX ORDER — POTASSIUM CHLORIDE 7.45 MG/ML
10 INJECTION INTRAVENOUS PRN
Status: DISCONTINUED | OUTPATIENT
Start: 2025-04-12 | End: 2025-04-14 | Stop reason: HOSPADM

## 2025-04-12 RX ORDER — ONDANSETRON 4 MG/1
4 TABLET, ORALLY DISINTEGRATING ORAL EVERY 8 HOURS PRN
Status: DISCONTINUED | OUTPATIENT
Start: 2025-04-12 | End: 2025-04-14 | Stop reason: HOSPADM

## 2025-04-12 RX ADMIN — LORAZEPAM 1 MG: 2 INJECTION INTRAMUSCULAR; INTRAVENOUS at 18:11

## 2025-04-12 RX ADMIN — SODIUM CHLORIDE, PRESERVATIVE FREE 10 ML: 5 INJECTION INTRAVENOUS at 23:23

## 2025-04-12 RX ADMIN — WATER 1000 MG: 1 INJECTION INTRAMUSCULAR; INTRAVENOUS; SUBCUTANEOUS at 18:47

## 2025-04-12 RX ADMIN — ENOXAPARIN SODIUM 40 MG: 100 INJECTION SUBCUTANEOUS at 23:19

## 2025-04-12 RX ADMIN — SODIUM CHLORIDE 1000 ML: 0.9 INJECTION, SOLUTION INTRAVENOUS at 18:12

## 2025-04-12 RX ADMIN — SODIUM CHLORIDE 800 ML: 0.9 INJECTION, SOLUTION INTRAVENOUS at 18:58

## 2025-04-12 NOTE — ED PROVIDER NOTES
EMERGENCY DEPARTMENT ENCOUNTER   ATTENDING ATTESTATION     Pt Name: Carrington Mulligan  MRN: [id number]  Birthdate 1955  Date of evaluation: 4/12/25   Carrington Mulligan is a 69 y.o. male with CC: No chief complaint on file.    MDM:   I performed a substantive part of the MDM during the patient's E/M visit. I personally evaluated and examined the patient. I personally made or approved the documented management plan and acknowledge its risk of complications.    Independent Interpretation: My (EKG/X-Ray/US/CT) interpretation *    Discussion: Management/test interpretation discussed with     69-year-old presents emergency department with complaints of generalized weakness, some change in his typical behavior patterns.  Patient is having generalized weakness, difficulty ambulating because he is so weak.    Plan is basic labs, rule out infectious processes.  Patient's workup will be somewhat delayed because of his condition, we we will attempt to obtain IV access prior to medicating him.         CRITICAL CARE:       EKG: All EKG's are interpreted by the Emergency Department Physician who either signs or Co-signs this chart in the absence of a cardiologist.      RADIOLOGY:All plain film, CT, MRI, and formal ultrasound images (except ED bedside ultrasound) are read by the radiologist, see reports below, unless otherwise noted in MDM or here.  XR CHEST PORTABLE    (Results Pending)     LABS: All lab results were reviewed by myself, and all abnormals are listed below.  Labs Reviewed   CULTURE, BLOOD 1   CULTURE, BLOOD 2   CBC WITH AUTO DIFFERENTIAL   COMPREHENSIVE METABOLIC PANEL   LACTIC ACID   TROPONIN   URINALYSIS     CONSULTS:  None  FINAL IMPRESSION    Generalized weakness      PASTMEDICAL HISTORY     Past Medical History:   Diagnosis Date    Allergic rhinitis     Autism     Epilepsy (HCC)     GERD (gastroesophageal reflux disease)     Hypertriglyceridemia     Mental retardation     Occupational circumstances     [employer]

## 2025-04-12 NOTE — ED PROVIDER NOTES
Emergency Department Encounter    Note to patient: The 21st Century Cures Act requires that medical notes like this one to be available to patients in the interest of transparency. Please be advised, this is a medical document. It is intended as [id number] communication. It is written in medical language and may contain abbreviations and verbiage that may be unfamiliar. It may appear to read blunt or direct. Medical documents are intended to carry relevant medical information, facts as evident and the clinical opinion of the practitioner.      Chief Complaint  No chief complaint on file.       HPI  69-year-old mentally retarded adult brought in from group home by direct caregiver who complains that there is generalized weakness, states that normally he gets up and walks he was having difficulty walking today difficulty getting in and out of the car he is behaving oddly to them and not himself.  Normally he will walk to the dining room table to sit to eat at mealtimes he will want to sit in his own chair and can go to the car and get in the correct side for himself but this time he was unable to get into the car and appeared very weak.  They state that this has been progressing over the past several days but became more apparent today           Past Medical History  Past Medical History:   Diagnosis Date    Allergic rhinitis     Autism     Epilepsy (HCC)     GERD (gastroesophageal reflux disease)     Hypertriglyceridemia     Mental retardation     Occupational circumstances     [employer] [place] [employer]    Seizures (HCC)         Surgical History  Past Surgical History:   Procedure Laterality Date    COLONOSCOPY  2012    Polyps    FINGER NAIL SURGERY Right 7/29/2021    NAILBED EXCISION MATRIXECTOMY, HALLUX TOTAL AVULSION performed by Vikram Seo DPM at [facility] OR    OTHER SURGICAL HISTORY Right 07/29/2021    NAILBED EXCISION MATRIXECTOMY, HALLUX TOTAL AVULSION (Right )- Dr Seo         Current Medications  Current

## 2025-04-13 PROBLEM — R41.0 DELIRIUM: Status: ACTIVE | Noted: 2025-04-13

## 2025-04-13 LAB
ALBUMIN SERPL-MCNC: 4.1 G/DL (ref 3.5–5.2)
ALBUMIN/GLOB SERPL: 1.3 {RATIO} (ref 1–2.5)
ALP SERPL-CCNC: 79 U/L (ref 40–129)
ALT SERPL-CCNC: 20 U/L (ref 10–50)
ANION GAP SERPL CALCULATED.3IONS-SCNC: 7 MMOL/L (ref 9–16)
AST SERPL-CCNC: 30 U/L (ref 10–50)
BASOPHILS # BLD: 0.07 K/UL (ref 0–0.2)
BASOPHILS NFR BLD: 1 % (ref 0–2)
BILIRUB SERPL-MCNC: 0.4 MG/DL (ref 0–1.2)
BUN SERPL-MCNC: 20 MG/DL (ref 8–23)
BUN/CREAT SERPL: 22 (ref 9–20)
CALCIUM SERPL-MCNC: 8.9 MG/DL (ref 8.6–10.4)
CHLORIDE SERPL-SCNC: 111 MMOL/L (ref 98–107)
CO2 SERPL-SCNC: 26 MMOL/L (ref 20–31)
CREAT SERPL-MCNC: 0.9 MG/DL (ref 0.7–1.2)
EKG ATRIAL RATE: 110 BPM
EKG P AXIS: 64 DEGREES
EKG P-R INTERVAL: 204 MS
EKG Q-T INTERVAL: 322 MS
EKG QRS DURATION: 92 MS
EKG QTC CALCULATION (BAZETT): 435 MS
EKG R AXIS: 45 DEGREES
EKG T AXIS: 27 DEGREES
EKG VENTRICULAR RATE: 110 BPM
EOSINOPHIL # BLD: 0.16 K/UL (ref 0–0.44)
EOSINOPHILS RELATIVE PERCENT: 2 % (ref 1–4)
ERYTHROCYTE [DISTWIDTH] IN BLOOD BY AUTOMATED COUNT: 13.8 % (ref 11.8–14.4)
GFR, ESTIMATED: >90 ML/MIN/1.73M2
GLUCOSE SERPL-MCNC: 86 MG/DL (ref 74–99)
HCT VFR BLD AUTO: 37 % (ref 40.7–50.3)
HGB BLD-MCNC: 12.7 G/DL (ref 13–17)
IMM GRANULOCYTES # BLD AUTO: <0.03 K/UL (ref 0–0.3)
IMM GRANULOCYTES NFR BLD: 0 %
LYMPHOCYTES NFR BLD: 1.84 K/UL (ref 1.1–3.7)
LYMPHOCYTES RELATIVE PERCENT: 24 % (ref 24–43)
MCH RBC QN AUTO: 29.9 PG (ref 25.2–33.5)
MCHC RBC AUTO-ENTMCNC: 34.3 G/DL (ref 28.4–34.8)
MCV RBC AUTO: 87.1 FL (ref 82.6–102.9)
MONOCYTES NFR BLD: 0.7 K/UL (ref 0.1–1.2)
MONOCYTES NFR BLD: 9 % (ref 3–12)
NEUTROPHILS NFR BLD: 64 % (ref 36–65)
NEUTS SEG NFR BLD: 4.82 K/UL (ref 1.5–8.1)
NRBC BLD-RTO: 0 PER 100 WBC
PLATELET # BLD AUTO: 194 K/UL (ref 138–453)
PMV BLD AUTO: 11.2 FL (ref 8.1–13.5)
POTASSIUM SERPL-SCNC: 4 MMOL/L (ref 3.7–5.3)
PROT SERPL-MCNC: 7.3 G/DL (ref 6.6–8.7)
RBC # BLD AUTO: 4.25 M/UL (ref 4.21–5.77)
SODIUM SERPL-SCNC: 144 MMOL/L (ref 136–145)
WBC OTHER # BLD: 7.6 K/UL (ref 3.5–11.3)

## 2025-04-13 PROCEDURE — 85025 COMPLETE CBC W/AUTO DIFF WBC: CPT

## 2025-04-13 PROCEDURE — 2500000003 HC RX 250 WO HCPCS: Performed by: INTERNAL MEDICINE

## 2025-04-13 PROCEDURE — 36415 COLL VENOUS BLD VENIPUNCTURE: CPT

## 2025-04-13 PROCEDURE — 93010 ELECTROCARDIOGRAM REPORT: CPT | Performed by: INTERNAL MEDICINE

## 2025-04-13 PROCEDURE — 6360000002 HC RX W HCPCS: Performed by: INTERNAL MEDICINE

## 2025-04-13 PROCEDURE — 80053 COMPREHEN METABOLIC PANEL: CPT

## 2025-04-13 PROCEDURE — 6370000000 HC RX 637 (ALT 250 FOR IP): Performed by: INTERNAL MEDICINE

## 2025-04-13 PROCEDURE — 1200000000 HC SEMI PRIVATE

## 2025-04-13 PROCEDURE — 94761 N-INVAS EAR/PLS OXIMETRY MLT: CPT

## 2025-04-13 PROCEDURE — 2580000003 HC RX 258: Performed by: INTERNAL MEDICINE

## 2025-04-13 RX ORDER — 0.9 % SODIUM CHLORIDE 0.9 %
1000 INTRAVENOUS SOLUTION INTRAVENOUS ONCE
Status: COMPLETED | OUTPATIENT
Start: 2025-04-13 | End: 2025-04-13

## 2025-04-13 RX ADMIN — CETIRIZINE HYDROCHLORIDE 10 MG: 10 TABLET, FILM COATED ORAL at 08:54

## 2025-04-13 RX ADMIN — SODIUM CHLORIDE, PRESERVATIVE FREE 10 ML: 5 INJECTION INTRAVENOUS at 08:55

## 2025-04-13 RX ADMIN — SERTRALINE 100 MG: 100 TABLET, FILM COATED ORAL at 08:54

## 2025-04-13 RX ADMIN — ENOXAPARIN SODIUM 40 MG: 100 INJECTION SUBCUTANEOUS at 08:54

## 2025-04-13 RX ADMIN — CHLORHEXIDINE GLUCONATE 0.12% ORAL RINSE 15 ML: 1.2 LIQUID ORAL at 20:07

## 2025-04-13 RX ADMIN — WATER 1000 MG: 1 INJECTION INTRAMUSCULAR; INTRAVENOUS; SUBCUTANEOUS at 08:54

## 2025-04-13 RX ADMIN — QUETIAPINE FUMARATE 25 MG: 25 TABLET ORAL at 20:07

## 2025-04-13 RX ADMIN — SODIUM CHLORIDE 1000 ML: 0.9 INJECTION, SOLUTION INTRAVENOUS at 08:40

## 2025-04-13 RX ADMIN — MIRTAZAPINE 15 MG: 15 TABLET, FILM COATED ORAL at 20:07

## 2025-04-13 NOTE — PLAN OF CARE
Problem: Discharge Planning  Goal: Discharge to home or other facility with appropriate resources  4/13/2025 0604 by Jesenia Escobedo GN  Outcome: Progressing  Flowsheets (Taken 4/13/2025 0604)  Discharge to home or other facility with appropriate resources:   Identify barriers to discharge with patient and caregiver   Identify discharge learning needs (meds, wound care, etc)     Problem: Safety - Adult  Goal: Free from fall injury  4/13/2025 0604 by Jesenia Escobedo GN  Outcome: Progressing  Flowsheets (Taken 4/13/2025 0110)  Free From Fall Injury: Instruct family/caregiver on patient safety

## 2025-04-13 NOTE — PROGRESS NOTES
Patient arrived to room 318 via bed. Report received from ER Nurse Anders Null caregiver at bedside. Caregiver helped writer to complete patient navigator. Patient nonverbal per caregiver. Patient resting with eyes closed. Caregiver states that is how patient presents when mental status is altered. Medications reviewed. Will contact Dr. Santacruz with diet orders.

## 2025-04-13 NOTE — PROGRESS NOTES
Vitals and assessment complete at this time. See flowsheets for details. Patient is agitated attempting to get up out of bed. Patient is weak/unsteady. Writer called for aidkelsie Chu. Patient was repositioned back in bed. Writer notified supervisor Gail VAZQUEZ of patient's agitation, writer requesting 1:1 supervision of patient at this time for patient safety. Dr. Santacruz was notified as well, no new orders.

## 2025-04-13 NOTE — H&P
John Santacruz M.D.  Internal Medicine History and Phyisical    Patient: Carrington Mulligan  Date of Admission: 4/12/2025  4:57 PM  Date of Evaluation: 4/13/2025      Patient:  Carrington Mulligan  MRN: [id number]    Chief Complaint:  No chief complaint on file.      History Obtained From:  patient, electronic medical record    PCP: John Santacruz MD    History of Present Illness:   The patient is a 69 y.o. male who presents with confusion and altered mental status with weakness from the group home.  He normally gets up pretty active overall he is having difficulty walking moving as well and not eating well and behaving a little more oddly than baseline.  He is autistic and clearly mostly nonverbal.    Past Medical History:        Diagnosis Date    Allergic rhinitis     Autism     Epilepsy     GERD (gastroesophageal reflux disease)     Hypertriglyceridemia     Mental retardation     Occupational circumstances     [employer]    Seizures (HCC)        Past Surgical History:        Procedure Laterality Date    COLONOSCOPY  2012    Polyps    FINGER NAIL SURGERY Right 7/29/2021    NAILBED EXCISION MATRIXECTOMY, HALLUX TOTAL AVULSION performed by Vikram Seo DPM at [facility] OR    OTHER SURGICAL HISTORY Right 07/29/2021    NAILBED EXCISION MATRIXECTOMY, HALLUX TOTAL AVULSION (Right )- Dr Seo        Family History:   History reviewed. No pertinent family history.    Social History:   TOBACCO:   reports that he has never smoked. He has never used smokeless tobacco.  ETOH:   reports no history of alcohol use.      Allergies:  Patient has no known allergies.    Medications Prior to Admission:    Prior to Admission medications    Medication Sig Start Date End Date Taking? Authorizing Provider   psyllium (REGULOID) 400 MG capsule Take 1 capsule by mouth in the morning and at bedtime   Yes Provider, MD Tito   chlorhexidine (PERIDEX) 0.12 % solution (REQUEST REFILL WHEN NEEDED) USE ORALLY TO RINSE TWICE DAILY WITH COTTON  SWAB 3/21/25  Yes John Santacruz MD   sertraline (ZOLOFT) 100 MG tablet Take 1 tablet by mouth daily 2/25/25  Yes ProviderTito MD   mirtazapine (REMERON) 15 MG tablet Take 1 tablet by mouth nightly Start Remeron 7.5 mg nightly for 2 weeks then after that 2 tablets nightly 2 weeks. 3/4/25  Yes John Santacruz MD   Calcium Carb-Cholecalciferol (OYSTER SHELL CALCIUM W/D) 500-5 MG-MCG TABS tablet TAKE 1 TABLET BY MOUTH TWICE DAILY 11/13/24  Yes John Santacruz MD   omeprazole (PRILOSEC) 20 MG delayed release capsule TAKE 2 CAPSULES (40MG) BY MOUTH ONCE EVERY DAY 11/13/24  Yes John Santacruz MD   oxyBUTYnin (DITROPAN-XL) 5 MG extended release tablet TAKE 1 TABLET BY MOUTH ONCE EVERY DAY 11/13/24  Yes John Santacruz MD   cetirizine (ZYRTEC) 10 MG tablet Take 1 tablet by mouth daily 8/19/24  Yes John Santacruz MD   QUEtiapine (SEROQUEL) 25 MG tablet Take 1 tablet by mouth nightly   Yes ProviderTito MD   psyllium 0.52 g capsule Take 1 capsule by mouth in the morning and at bedtime  Patient not taking: Reported on 4/12/2025 3/4/25   John Santacruz MD   Acetaminophen Extra Strength 500 MG TABS TAKE 2 TABLETS (1000MG) BY MOUTH EVERY 8 HOURS AS NEEDED FOR PAIN OR FEVER GREATER THAN 100.4 *MAX 6 TABS IN 12HRS* 7/31/24   John Santacruz MD   neomycin-bacitracin-polymyxin (NEOSPORIN) 3.5-400-5000 OINT ointment APPLY TOPPICALLY TO AFFTECTED AREAS 4 TIMES DAILY AS NEEDED *CALL PHARMACY FOR REFILLS* 5/5/23   John Santacruz MD   Disposable Gloves MISC 1 pair as directed QD-BID 4/10/23   John Santacruz MD   Incontinence Supply Disposable (FQ PROTECTIVE UNDERWEAR) MISC USE AS DIRECTED FOR INCONTINENCE (319) *CALL PHARMACY FOR REFILLS*    SIZE SMALL/MEDIUM 7/26/21   John Santacruz MD   Disposable Gloves (VINYL GLOVES LARGE) MISC 1 PAIR AS DIRECTED QD-BID 6/28/17   John Santacruz MD       Review of Systems:  Unable to do due to medical condition all other systems    Component Value Date/Time    LACTA 1.8 04/12/2025 06:05 PM    LACTA 1.7 12/07/2015 12:28 PM        D-Dimer:  No results found for: \"DDIMER\"    PT/INR:  No results found for: \"PROTIME\", \"INR\"    High Sensitivity Troponin:  Recent Labs     04/12/25  1805   TROPHS 14       ABGs:   Lab Results   Component Value Date/Time    FIO2 NOT REPORTED 11/18/2016 04:29 PM           XR CHEST PORTABLE   Final Result   Airways disease versus infectious/inflammatory airways process.             Assessment:    UTI (urinary tract infection)    Principal Problem:    UTI (urinary tract infection)  Active Problems:    Autistic spectrum disorder    Delirium  Resolved Problems:    * No resolved hospital problems. 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 *    Estimated length of stay is 2 or 3 days  Antibiotics  Fluids  Medication Monitoring / High Risk Medications: none   Condition is {Blank single:12100::\"--STRAIGHTFORWARD--\",\"a self-limited or minor problem\",\"--LOW COMPLEXITY--\",\"a stable chronic illness\",\"an acute, uncomplicated illness or injury\",\"--MODERATE COMPLEXITY--\",  Moderate complex      MALNUTRITION ASSESSMENT AND PLAN    The following was documented by the Dietitian:

## 2025-04-13 NOTE — PROGRESS NOTES
Writer notified Dr. Santacruz of patient's minimal oral intake. 1L NS bolus ordered per Neeta. Care ongoing.

## 2025-04-13 NOTE — PROGRESS NOTES
Writer notified Dr. Santacruz regarding patient's diet at his group home. Will change order per Dr. Santacruz.

## 2025-04-13 NOTE — PROGRESS NOTES
Writer to bedside. Employee from patient's group home is at bedside. Patient's agitation has improved greatly with her here. Patient is sitting up in the chair. No distress noted. Care ongoing.

## 2025-04-13 NOTE — PROGRESS NOTES
Writer at bedside to complete evening assessment. Upon entry to room, pt sitting up in chair, respirations even and non-labored while on room air. Patient would only let writer obtain BP and temp. Refused all other VS and  assessment completed, see flow sheet for details. Pt caregiver from group home here and denies needs from writer at this time. Call light in reach. Care is ongoing.

## 2025-04-14 VITALS
OXYGEN SATURATION: 95 % | WEIGHT: 134.48 LBS | RESPIRATION RATE: 20 BRPM | TEMPERATURE: 98.7 F | DIASTOLIC BLOOD PRESSURE: 127 MMHG | HEIGHT: 62 IN | HEART RATE: 107 BPM | SYSTOLIC BLOOD PRESSURE: 143 MMHG | BODY MASS INDEX: 24.75 KG/M2

## 2025-04-14 LAB
ALBUMIN SERPL-MCNC: 4.3 G/DL (ref 3.5–5.2)
ALBUMIN/GLOB SERPL: 1.3 {RATIO} (ref 1–2.5)
ALP SERPL-CCNC: 80 U/L (ref 40–129)
ALT SERPL-CCNC: 17 U/L (ref 10–50)
ANION GAP SERPL CALCULATED.3IONS-SCNC: 9 MMOL/L (ref 9–16)
AST SERPL-CCNC: 29 U/L (ref 10–50)
BASOPHILS # BLD: 0.07 K/UL (ref 0–0.2)
BASOPHILS NFR BLD: 1 % (ref 0–2)
BILIRUB SERPL-MCNC: 0.5 MG/DL (ref 0–1.2)
BUN SERPL-MCNC: 14 MG/DL (ref 8–23)
BUN/CREAT SERPL: 16 (ref 9–20)
CALCIUM SERPL-MCNC: 9.1 MG/DL (ref 8.6–10.4)
CHLORIDE SERPL-SCNC: 110 MMOL/L (ref 98–107)
CO2 SERPL-SCNC: 24 MMOL/L (ref 20–31)
CREAT SERPL-MCNC: 0.9 MG/DL (ref 0.7–1.2)
EOSINOPHIL # BLD: 0.13 K/UL (ref 0–0.44)
EOSINOPHILS RELATIVE PERCENT: 2 % (ref 1–4)
ERYTHROCYTE [DISTWIDTH] IN BLOOD BY AUTOMATED COUNT: 13.9 % (ref 11.8–14.4)
GFR, ESTIMATED: >90 ML/MIN/1.73M2
GLUCOSE SERPL-MCNC: 98 MG/DL (ref 74–99)
HCT VFR BLD AUTO: 39.9 % (ref 40.7–50.3)
HGB BLD-MCNC: 13.5 G/DL (ref 13–17)
IMM GRANULOCYTES # BLD AUTO: 0.03 K/UL (ref 0–0.3)
IMM GRANULOCYTES NFR BLD: 0 %
LYMPHOCYTES NFR BLD: 1.84 K/UL (ref 1.1–3.7)
LYMPHOCYTES RELATIVE PERCENT: 21 % (ref 24–43)
MAGNESIUM SERPL-MCNC: 2 MG/DL (ref 1.6–2.4)
MCH RBC QN AUTO: 29.3 PG (ref 25.2–33.5)
MCHC RBC AUTO-ENTMCNC: 33.8 G/DL (ref 28.4–34.8)
MCV RBC AUTO: 86.6 FL (ref 82.6–102.9)
MONOCYTES NFR BLD: 0.81 K/UL (ref 0.1–1.2)
MONOCYTES NFR BLD: 9 % (ref 3–12)
NEUTROPHILS NFR BLD: 67 % (ref 36–65)
NEUTS SEG NFR BLD: 6.02 K/UL (ref 1.5–8.1)
NRBC BLD-RTO: 0 PER 100 WBC
PLATELET # BLD AUTO: 210 K/UL (ref 138–453)
PMV BLD AUTO: 11.7 FL (ref 8.1–13.5)
POTASSIUM SERPL-SCNC: 3.3 MMOL/L (ref 3.7–5.3)
PROT SERPL-MCNC: 7.6 G/DL (ref 6.6–8.7)
RBC # BLD AUTO: 4.61 M/UL (ref 4.21–5.77)
SODIUM SERPL-SCNC: 143 MMOL/L (ref 136–145)
WBC OTHER # BLD: 8.9 K/UL (ref 3.5–11.3)

## 2025-04-14 PROCEDURE — 85025 COMPLETE CBC W/AUTO DIFF WBC: CPT

## 2025-04-14 PROCEDURE — 83735 ASSAY OF MAGNESIUM: CPT

## 2025-04-14 PROCEDURE — 2500000003 HC RX 250 WO HCPCS: Performed by: INTERNAL MEDICINE

## 2025-04-14 PROCEDURE — 80053 COMPREHEN METABOLIC PANEL: CPT

## 2025-04-14 PROCEDURE — 6370000000 HC RX 637 (ALT 250 FOR IP): Performed by: INTERNAL MEDICINE

## 2025-04-14 PROCEDURE — 6360000002 HC RX W HCPCS: Performed by: INTERNAL MEDICINE

## 2025-04-14 PROCEDURE — 94761 N-INVAS EAR/PLS OXIMETRY MLT: CPT

## 2025-04-14 PROCEDURE — 36415 COLL VENOUS BLD VENIPUNCTURE: CPT

## 2025-04-14 RX ORDER — LEVOFLOXACIN 500 MG/1
500 TABLET, FILM COATED ORAL DAILY
Qty: 7 TABLET | Refills: 0 | Status: ON HOLD | OUTPATIENT
Start: 2025-04-14 | End: 2025-04-30 | Stop reason: HOSPADM

## 2025-04-14 RX ADMIN — CHLORHEXIDINE GLUCONATE 0.12% ORAL RINSE 15 ML: 1.2 LIQUID ORAL at 09:22

## 2025-04-14 RX ADMIN — SERTRALINE 100 MG: 100 TABLET, FILM COATED ORAL at 09:22

## 2025-04-14 RX ADMIN — CETIRIZINE HYDROCHLORIDE 10 MG: 10 TABLET, FILM COATED ORAL at 09:22

## 2025-04-14 RX ADMIN — POTASSIUM CHLORIDE 40 MEQ: 1500 TABLET, EXTENDED RELEASE ORAL at 09:22

## 2025-04-14 RX ADMIN — ENOXAPARIN SODIUM 40 MG: 100 INJECTION SUBCUTANEOUS at 09:22

## 2025-04-14 RX ADMIN — SODIUM CHLORIDE, PRESERVATIVE FREE 5 ML: 5 INJECTION INTRAVENOUS at 09:22

## 2025-04-14 RX ADMIN — WATER 1000 MG: 1 INJECTION INTRAMUSCULAR; INTRAVENOUS; SUBCUTANEOUS at 09:22

## 2025-04-14 NOTE — PROGRESS NOTES
Spoke with Legal Guardian from [facility], Brad Gillig re:  Patient discharge planning.  Ruben states that Patient is from a [facility] group home and his plan will be to return to group home at discharge.  No answer at group home at this time.    Caryl Jeff, AIDA  4/14/2025

## 2025-04-14 NOTE — PROGRESS NOTES
Vitals and assessment completed at this time, see flowsheets. Patient resting bed, appears comfortable. No needs at this current time. Bed alarm on, call light in reach. Will continue to monitor.

## 2025-04-14 NOTE — PROGRESS NOTES
Patient discharged back to group home in stable condition. Discharge instructions and medications reviewed with patient's caregiver. Verbalized understanding. No further questions.

## 2025-04-14 NOTE — PLAN OF CARE
Problem: Nutrition Deficit:  Goal: Optimize nutritional status  4/14/2025 1014 by Elizabeth Echavarria  Outcome: Progressing  Flowsheets (Taken 4/14/2025 1014)  Nutrient intake appropriate for improving, restoring, or maintaining nutritional needs: Monitor oral intake, labs, and treatment plans  Note: Nutrition Problem #1: Inadequate oral intake  Intervention: Food and/or Nutrient Delivery: Continue Current Diet, Continue Oral Nutrition Supplement       Problem: Discharge Planning  Goal: Discharge to home or other facility with appropriate resources  Recent Flowsheet Documentation  Taken 4/14/2025 0710 by Dede Aragon RN  Discharge to home or other facility with appropriate resources: Identify barriers to discharge with patient and caregiver     Problem: Safety - Adult  Goal: Free from fall injury  Recent Flowsheet Documentation  Taken 4/14/2025 0710 by Dede Aragon RN  Free From Fall Injury: Instruct family/caregiver on patient safety     Problem: Skin/Tissue Integrity  Goal: Skin integrity remains intact  Description: 1.  Monitor for areas of redness and/or skin breakdown2.  Assess vascular access sites hourly3.  Every 4-6 hours minimum:  Change oxygen saturation probe site4.  Every 4-6 hours:  If on nasal continuous positive airway pressure, respiratory therapy assess nares and determine need for appliance change or resting period  Recent Flowsheet Documentation  Taken 4/14/2025 0710 by Dede Aragon, RN  Skin Integrity Remains Intact: Monitor for areas of redness and/or skin breakdown

## 2025-04-14 NOTE — PROGRESS NOTES
Comprehensive Nutrition Assessment    Type and Reason for Visit:  Initial    Nutrition Recommendations/Plan:   Continue oral supplement.  Consume > 50% of meals.      Malnutrition Assessment:  Malnutrition Status:  At risk for malnutrition (04/14/25 0943)    Context:  Acute Illness     Findings of the 6 clinical characteristics of malnutrition:  Energy Intake:     Weight Loss:  Mild weight loss (weight has been trending up for 8 months; at 1 year there was a weight loss of 8.8%)     Body Fat Loss:  No body fat loss     Muscle Mass Loss:  No muscle mass loss    Fluid Accumulation:  No fluid accumulation     Strength:  Not Performed    Nutrition Assessment:    Inadequate oral intake r/t swallowing difficulty aeb intake 51-75% pt is on a dysphagia 2 diet with minced and moist food and honey thickened liquids. Meal intakes have been 1-25% with % intake of oral nutrition supplement. Pt is non-verbal when entering the room talked to bed side nurse about eatting habits. Nurse expressed that oral supplements are taken well while food orally is not, nurse expressed that when employees from the group visit the pt is less agitated and more open to eatting. Pt has active bowel movement. low K (3.3), and no edema.    Nutrition Related Findings:    low K (3.3), No edema, active bowel sounds Wound Type: None       Current Nutrition Intake & Therapies:    Average Meal Intake: 1-25%  Average Supplements Intake: %  ADULT DIET; Dysphagia - Minced and Moist; Moderately Thick (Honey)  ADULT ORAL NUTRITION SUPPLEMENT; Breakfast, Lunch, Dinner; Frozen Oral Supplement    Anthropometric Measures:  Height: 157.5 cm (5' 2\")  Ideal Body Weight (IBW): 118 lbs (54 kg)    Admission Body Weight: 60.3 kg (132 lb 15 oz)  Current Body Weight: 61 kg (134 lb 7.7 oz), 114 % IBW. Weight Source: Bed scale  Current BMI (kg/m2): 24.6  Usual Body Weight: 60.3 kg (133 lb) (weight ranges 147-128 for the past year)     % Weight Change

## 2025-04-14 NOTE — DISCHARGE INSTR - DIET
Good nutrition is important when healing from an illness, injury, or surgery.  Follow any nutrition recommendations given to you during your hospital stay.   If you were given an oral nutrition supplement while in the hospital, continue to take this supplement at home.  You can take it with meals, in-between meals, and/or before bedtime. These supplements can be purchased at most local grocery stores, pharmacies, and chain [employer]-stores.   If you have any questions about your diet or nutrition, call the hospital and ask for the dietitian.      Minced/moist  Honey Thick Liquids

## 2025-04-14 NOTE — PROGRESS NOTES
Antibiotic reviewed with caregiver, packet if information given to her, all questions answered, to exit per wheelchair.

## 2025-04-14 NOTE — PLAN OF CARE
Problem: Discharge Planning  Goal: Discharge to home or other facility with appropriate resources  Outcome: Progressing  Flowsheets (Taken 4/13/2025 2321)  Discharge to home or other facility with appropriate resources:   Identify barriers to discharge with patient and caregiver   Identify discharge learning needs (meds, wound care, etc)     Problem: Safety - Adult  Goal: Free from fall injury  Outcome: Progressing  Flowsheets (Taken 4/13/2025 2321)  Free From Fall Injury: Instruct family/caregiver on patient safety

## 2025-04-14 NOTE — DISCHARGE INSTR - COC
Continuity of Care Form    Patient Name: Carrington Mulligan   :  1955  MRN:  [id number]    Admit date:  2025  Discharge date:  2025    Code Status Order: Full Code   Advance Directives:     Admitting Physician:  John Santacruz MD  PCP: John Santacruz MD    Discharging Nurse: Dede Toth Hospital Unit/Room#: 0318/0318-01  Discharging Unit Phone Number: [phone]    Emergency Contact:   Extended Emergency Contact Information  Primary Emergency Contact: Apsi,Brad Gillig  Home Phone: [phone]  Mobile Phone: [phone]  Relation: Legal Guardian  Secondary Emergency Contact: [employer]   [place]  Home Phone: [phone]  Mobile Phone: [phone]  Relation: Legal Guardian    Past Surgical History:  Past Surgical History:   Procedure Laterality Date    COLONOSCOPY      Polyps    FINGER NAIL SURGERY Right 2021    NAILBED EXCISION MATRIXECTOMY, HALLUX TOTAL AVULSION performed by Vikram Seo DPM at [facility] OR    OTHER SURGICAL HISTORY Right 2021    NAILBED EXCISION MATRIXECTOMY, HALLUX TOTAL AVULSION (Right )- Dr Seo        Immunization History:   Immunization History   Administered Date(s) Administered    COVID-19, MODERNA BLUE border, Primary or Immunocompromised, (age 12y+), IM, 100 mcg/0.5mL 2021, 2021, 2022    Influenza Virus Vaccine 10/14/2014, 2015, 2016, 10/07/2020    Influenza, AFLURIA (age 3 y+), FLUZONE, (age 6 mo+), Quadv MDV, 0.5mL 10/07/2020    Influenza, Quadv, 6 mo and older, IM (Fluzone, Flulaval) 10/13/2017    Pneumococcal, PCV-13, PREVNAR 13, (age 6w+), IM, 0.5mL 04/15/2019    Pneumococcal, PPSV23, PNEUMOVAX 23, (age 2y+), SC/IM, 0.5mL 2015       Active Problems:  Patient Active Problem List   Diagnosis Code    Autistic spectrum disorder F84.0    OCD (obsessive compulsive disorder) F42.9    Generalized nonconvulsive epilepsy (HCC) G40.309    Urinary incontinence R32    Gastroesophageal reflux  none  Weight Bearing Status/Restrictions: No weight bearing restrictions  Other Medical Equipment (for information only, NOT a DME order):  wheelchair  Other Treatments: ***    Patient's personal belongings (please select all that are sent with patient):  None    RN SIGNATURE:  Electronically signed by Dede Aragon RN on 4/14/25 at 3:40 PM EDT    CASE MANAGEMENT/SOCIAL WORK SECTION    Inpatient Status Date: ***    Readmission Risk Assessment Score:  [facility] RISK OF UNPLANNED READMISSION 2.0             12.1 Total Score        Discharging to Facility/ Agency   Name:   Address:  Phone:  Fax:    Dialysis Facility (if applicable)   Name:  Address:  Dialysis Schedule:  Phone:  Fax:    / signature: {Esignature:[id number]}    PHYSICIAN SECTION    Prognosis: Good    Condition at Discharge: Stable    Rehab Potential (if transferring to Rehab): Good    Recommended Labs or Other Treatments After Discharge: None    Physician Certification: I certify the above information and transfer of Carrington Mulligan  is necessary for the continuing treatment of the diagnosis listed and that he requires Intermediate/Mental Retardation/Developmental Disabilities Care for greater 30 days.     Update Admission H&P: {CHP DME Changes in HandP:[id number]}    PHYSICIAN SIGNATURE:  {Esignature:[id number]}

## 2025-04-14 NOTE — DISCHARGE SUMMARY
Discharge Summary    Carrington Mulligan  :  1955  MRN:  [id number]    Admit date:  2025      Discharge date:     2025    Admitting Physician:  John Santacruz MD    Discharge Diagnoses:      Principal Problem:    UTI (urinary tract infection)  Active Problems:    Autistic spectrum disorder    Delirium  Resolved Problems:    * No resolved hospital problems. *      Active Hospital Problems    Diagnosis Date Noted    Delirium [R41.0] 2025    UTI (urinary tract infection) [N39.0] 2025    Autistic spectrum disorder [F84.0] 2012       Discharge Medications:         Medication List        START taking these medications      levoFLOXacin 500 MG tablet  Commonly known as: LEVAQUIN  Take 1 tablet by mouth daily for 7 days            CHANGE how you take these medications      Reguloid 400 MG capsule  Generic drug: psyllium  What changed: Another medication with the same name was removed. Continue taking this medication, and follow the directions you see here.            CONTINUE taking these medications      Acetaminophen Extra Strength 500 MG Tabs  TAKE 2 TABLETS (1000MG) BY MOUTH EVERY 8 HOURS AS NEEDED FOR PAIN OR FEVER GREATER THAN 100.4 *MAX 6 TABS IN 12HRS*     cetirizine 10 MG tablet  Commonly known as: ZYRTEC  Take 1 tablet by mouth daily     chlorhexidine 0.12 % solution  Commonly known as: PERIDEX  (REQUEST REFILL WHEN NEEDED) USE ORALLY TO RINSE TWICE DAILY WITH COTTON SWAB     FQ Protective Underwear Misc  USE AS DIRECTED FOR INCONTINENCE (319) *CALL PHARMACY FOR REFILLS*    SIZE SMALL/MEDIUM     mirtazapine 15 MG tablet  Commonly known as: REMERON  Take 1 tablet by mouth nightly Start Remeron 7.5 mg nightly for 2 weeks then after that 2 tablets nightly 2 weeks.     neomycin-bacitracin-polymyxin 3.5-400-5000 Oint ointment  APPLY TOPPICALLY TO AFFTECTED AREAS 4 TIMES DAILY AS NEEDED *CALL PHARMACY FOR REFILLS*     omeprazole 20 MG delayed release capsule  Commonly known as:

## 2025-04-15 ENCOUNTER — CARE COORDINATION (OUTPATIENT)
Dept: CARE COORDINATION | Age: 70
End: 2025-04-15

## 2025-04-15 LAB
MICROORGANISM SPEC CULT: ABNORMAL
SERVICE CMNT-IMP: ABNORMAL
SPECIMEN DESCRIPTION: ABNORMAL

## 2025-04-15 NOTE — CARE COORDINATION
Care Transitions Note    Initial Call - Call within 2 business days of discharge: No    Attempted to reach patient for transitions of care follow up. Unable to reach patient.    Outreach Attempts:   HIPAA compliant voicemail left for patient. Left VM on number for group home    Patient: Carrington Mulligan    Patient : 1955   MRN: [id number]    Reason for Admission: weakness/ UTI  Discharge Date: 25  RURS: Readmission Risk Score: 12.2    Last Discharge Facility       Date Complaint Diagnosis Description Type Department Provider    25 Other General weakness ... ED to [facility]osp[facility]Discharged[facility]ITT[facility]; [place]...            Was this an external facility discharge? No    Follow Up Appointment:   Patient has hospital follow up appointment scheduled within 14 days of discharge.    Future Appointments         Provider Specialty Dept Phone    2025 8:30 AM John Santacruz MD Internal Medicine [phone]    2025 9:30 AM John Santacruz MD Internal Medicine [phone]    2026 9:30 AM John Santacruz MD Internal Medicine [phone]            Plan for follow-up on next business day.      Esther Verdin RN

## 2025-04-16 ENCOUNTER — CARE COORDINATION (OUTPATIENT)
Dept: CARE COORDINATION | Age: 70
End: 2025-04-16

## 2025-04-16 NOTE — CARE COORDINATION
Care Transitions Note    Initial Call - Call within 2 business days of discharge: Yes    Attempted to reach patient, caregiver, group home  for transitions of care follow up. Unable to reach patient, caregiver, group home .    Outreach Attempts:   Multiple attempts to contact caregiver, group home at phone numbers on file.   HIPAA compliant voicemail left for patient, caregiver, group home. Second attempt, left VM at number for patient's group home.     Patient: Carrington Mulligan    Patient : 1955   MRN: [id number]    Reason for Admission: weakness, UTI  Discharge Date: 25  RURS: Readmission Risk Score: 12.2    Last Discharge Facility       Date Complaint Diagnosis Description Type Department Provider    25 Other General weakness ... ED to Hosp-Admission (Discharged) (ADMITTED) [facility] John Santacruz MD; [facility]...            Was this an external facility discharge? No    Follow Up Appointment:   Patient has hospital follow up appointment scheduled within 14 days of discharge.    Future Appointments         Provider Specialty Dept Phone    2025 8:30 AM John Santacruz MD Internal Medicine [phone]    2025 9:30 AM John Santacruz MD Internal Medicine [phone]    2026 9:30 AM John Santacruz MD Internal Medicine [phone]            No further follow-up call indicated     Esther Verdin RN

## 2025-04-17 LAB
MICROORGANISM SPEC CULT: NORMAL
MICROORGANISM SPEC CULT: NORMAL
SERVICE CMNT-IMP: NORMAL
SERVICE CMNT-IMP: NORMAL
SPECIMEN DESCRIPTION: NORMAL
SPECIMEN DESCRIPTION: NORMAL

## 2025-04-18 ENCOUNTER — APPOINTMENT (OUTPATIENT)
Dept: INTERVENTIONAL RADIOLOGY/VASCULAR | Age: 70
DRG: 023 | End: 2025-04-18
Payer: MEDICARE

## 2025-04-18 ENCOUNTER — ANESTHESIA (OUTPATIENT)
Dept: INTERVENTIONAL RADIOLOGY/VASCULAR | Age: 70
End: 2025-04-18
Payer: MEDICARE

## 2025-04-18 ENCOUNTER — APPOINTMENT (OUTPATIENT)
Dept: CT IMAGING | Age: 70
DRG: 023 | End: 2025-04-18
Payer: MEDICARE

## 2025-04-18 ENCOUNTER — APPOINTMENT (OUTPATIENT)
Dept: CT IMAGING | Age: 70
End: 2025-04-18
Attending: EMERGENCY MEDICINE
Payer: MEDICARE

## 2025-04-18 ENCOUNTER — HOSPITAL ENCOUNTER (INPATIENT)
Age: 70
LOS: 12 days | Discharge: NURSING, REHAB OR LONG-TERM CARE | DRG: 023 | End: 2025-04-30
Attending: EMERGENCY MEDICINE | Admitting: PSYCHIATRY & NEUROLOGY
Payer: MEDICARE

## 2025-04-18 ENCOUNTER — HOSPITAL ENCOUNTER (EMERGENCY)
Age: 70
Discharge: ANOTHER HOSPITAL | End: 2025-04-18
Attending: EMERGENCY MEDICINE
Payer: MEDICARE

## 2025-04-18 ENCOUNTER — APPOINTMENT (OUTPATIENT)
Dept: GENERAL RADIOLOGY | Age: 70
DRG: 023 | End: 2025-04-18
Payer: MEDICARE

## 2025-04-18 ENCOUNTER — ANESTHESIA EVENT (OUTPATIENT)
Dept: INTERVENTIONAL RADIOLOGY/VASCULAR | Age: 70
End: 2025-04-18
Payer: MEDICARE

## 2025-04-18 VITALS
HEART RATE: 58 BPM | RESPIRATION RATE: 19 BRPM | OXYGEN SATURATION: 98 % | DIASTOLIC BLOOD PRESSURE: 65 MMHG | TEMPERATURE: 97.8 F | SYSTOLIC BLOOD PRESSURE: 159 MMHG

## 2025-04-18 DIAGNOSIS — I63.412 CEREBROVASCULAR ACCIDENT (CVA) DUE TO EMBOLISM OF LEFT MIDDLE CEREBRAL ARTERY (HCC): ICD-10-CM

## 2025-04-18 DIAGNOSIS — R13.19 ESOPHAGEAL DYSPHAGIA: ICD-10-CM

## 2025-04-18 DIAGNOSIS — R13.11 ORAL PHASE DYSPHAGIA: ICD-10-CM

## 2025-04-18 DIAGNOSIS — I63.9 CEREBROVASCULAR ACCIDENT (CVA), UNSPECIFIED MECHANISM (HCC): Primary | ICD-10-CM

## 2025-04-18 DIAGNOSIS — I63.419 CEREBROVASCULAR ACCIDENT (CVA) DUE TO EMBOLISM OF MIDDLE CEREBRAL ARTERY, UNSPECIFIED BLOOD VESSEL LATERALITY (HCC): ICD-10-CM

## 2025-04-18 DIAGNOSIS — I63.512 ACUTE ISCHEMIC LEFT MIDDLE CEREBRAL ARTERY (MCA) STROKE (HCC): ICD-10-CM

## 2025-04-18 PROBLEM — I67.89: Status: ACTIVE | Noted: 2025-04-18

## 2025-04-18 PROBLEM — R47.01 GLOBAL APHASIA: Status: ACTIVE | Noted: 2025-04-18

## 2025-04-18 PROBLEM — G81.11: Status: ACTIVE | Noted: 2025-04-18

## 2025-04-18 LAB
ACT BLD: 158 SEC (ref 79–149)
ALBUMIN SERPL-MCNC: 4.7 G/DL (ref 3.5–5.2)
ALBUMIN/GLOB SERPL: 1.3 {RATIO} (ref 1–2.5)
ALP SERPL-CCNC: 86 U/L (ref 40–129)
ALT SERPL-CCNC: 27 U/L (ref 10–50)
ANION GAP SERPL CALCULATED.3IONS-SCNC: 13 MMOL/L (ref 9–16)
ARTERIAL PATENCY WRIST A: ABNORMAL
AST SERPL-CCNC: 34 U/L (ref 10–50)
BACTERIA URNS QL MICRO: NORMAL
BASOPHILS # BLD: 0.06 K/UL (ref 0–0.2)
BASOPHILS NFR BLD: 1 % (ref 0–2)
BILIRUB SERPL-MCNC: 0.8 MG/DL (ref 0–1.2)
BILIRUB UR QL STRIP: NEGATIVE
BODY TEMPERATURE: 37
BUN SERPL-MCNC: 33 MG/DL (ref 8–23)
BUN/CREAT SERPL: 30 (ref 9–20)
CALCIUM SERPL-MCNC: 9.7 MG/DL (ref 8.6–10.4)
CASTS #/AREA URNS LPF: NORMAL /LPF (ref 0–8)
CHLORIDE SERPL-SCNC: 110 MMOL/L (ref 98–107)
CLARITY UR: CLEAR
CO2 SERPL-SCNC: 21 MMOL/L (ref 20–31)
COLOR UR: YELLOW
CREAT SERPL-MCNC: 1.1 MG/DL (ref 0.7–1.2)
EOSINOPHIL # BLD: 0.04 K/UL (ref 0–0.44)
EOSINOPHILS RELATIVE PERCENT: 0 % (ref 1–4)
EPI CELLS #/AREA URNS HPF: NORMAL /HPF (ref 0–5)
ERYTHROCYTE [DISTWIDTH] IN BLOOD BY AUTOMATED COUNT: 13.9 % (ref 11.8–14.4)
FIO2 ON VENT: 21 %
GFR, ESTIMATED: 76 ML/MIN/1.73M2
GLUCOSE BLD-MCNC: 96 MG/DL (ref 74–100)
GLUCOSE SERPL-MCNC: 105 MG/DL (ref 74–99)
GLUCOSE UR STRIP-MCNC: NEGATIVE MG/DL
HCO3 VENOUS: 20.2 MMOL/L (ref 24–30)
HCT VFR BLD AUTO: 39.9 % (ref 40.7–50.3)
HGB BLD-MCNC: 13.5 G/DL (ref 13–17)
HGB UR QL STRIP.AUTO: NEGATIVE
IMM GRANULOCYTES # BLD AUTO: 0.06 K/UL (ref 0–0.3)
IMM GRANULOCYTES NFR BLD: 1 %
INR PPP: 1.3
KETONES UR STRIP-MCNC: NEGATIVE MG/DL
LEUKOCYTE ESTERASE UR QL STRIP: NEGATIVE
LYMPHOCYTES NFR BLD: 1.05 K/UL (ref 1.1–3.7)
LYMPHOCYTES RELATIVE PERCENT: 9 % (ref 24–43)
MCH RBC QN AUTO: 29.9 PG (ref 25.2–33.5)
MCHC RBC AUTO-ENTMCNC: 33.8 G/DL (ref 28.4–34.8)
MCV RBC AUTO: 88.3 FL (ref 82.6–102.9)
MONOCYTES NFR BLD: 0.64 K/UL (ref 0.1–1.2)
MONOCYTES NFR BLD: 6 % (ref 3–12)
NEGATIVE BASE EXCESS, VEN: 4.1 MMOL/L (ref 0–2)
NEUTROPHILS NFR BLD: 83 % (ref 36–65)
NEUTS SEG NFR BLD: 9.82 K/UL (ref 1.5–8.1)
NITRITE UR QL STRIP: NEGATIVE
NRBC BLD-RTO: 0 PER 100 WBC
O2 SAT, VEN: 98.3 % (ref 60–85)
PARTIAL THROMBOPLASTIN TIME: 30.8 SEC (ref 26.8–34.8)
PCO2 VENOUS: 35 MM HG (ref 39–55)
PCO2, VEN, TEMP ADJ: 35 MMHG (ref 39–55)
PH UR STRIP: 5 [PH] (ref 5–8)
PH VENOUS: 7.38 (ref 7.32–7.42)
PH, VEN, TEMP ADJ: 7.38 (ref 7.32–7.42)
PLATELET # BLD AUTO: 221 K/UL (ref 138–453)
PMV BLD AUTO: 10.8 FL (ref 8.1–13.5)
PO2 VENOUS: 118.5 MM HG (ref 30–50)
PO2, VEN, TEMP ADJ: 118.5 MMHG (ref 30–50)
POTASSIUM SERPL-SCNC: 3.9 MMOL/L (ref 3.7–5.3)
PROT SERPL-MCNC: 8.3 G/DL (ref 6.6–8.7)
PROT UR STRIP-MCNC: ABNORMAL MG/DL
PROTHROMBIN TIME: 15.7 SEC (ref 11.7–14.1)
RBC # BLD AUTO: 4.52 M/UL (ref 4.21–5.77)
RBC #/AREA URNS HPF: NORMAL /HPF (ref 0–4)
SODIUM SERPL-SCNC: 144 MMOL/L (ref 136–145)
SP GR UR STRIP: 1.06 (ref 1–1.03)
UROBILINOGEN UR STRIP-ACNC: NORMAL EU/DL (ref 0–1)
WBC #/AREA URNS HPF: NORMAL /HPF (ref 0–5)
WBC OTHER # BLD: 11.7 K/UL (ref 3.5–11.3)

## 2025-04-18 PROCEDURE — 71045 X-RAY EXAM CHEST 1 VIEW: CPT

## 2025-04-18 PROCEDURE — 82947 ASSAY GLUCOSE BLOOD QUANT: CPT

## 2025-04-18 PROCEDURE — 70496 CT ANGIOGRAPHY HEAD: CPT

## 2025-04-18 PROCEDURE — 6360000002 HC RX W HCPCS

## 2025-04-18 PROCEDURE — 93005 ELECTROCARDIOGRAM TRACING: CPT | Performed by: EMERGENCY MEDICINE

## 2025-04-18 PROCEDURE — 99222 1ST HOSP IP/OBS MODERATE 55: CPT | Performed by: NURSE PRACTITIONER

## 2025-04-18 PROCEDURE — 6360000002 HC RX W HCPCS: Performed by: PSYCHIATRY & NEUROLOGY

## 2025-04-18 PROCEDURE — 6360000004 HC RX CONTRAST MEDICATION: Performed by: PSYCHIATRY & NEUROLOGY

## 2025-04-18 PROCEDURE — B31RYZZ FLUOROSCOPY OF INTRACRANIAL ARTERIES USING OTHER CONTRAST: ICD-10-PCS | Performed by: PSYCHIATRY & NEUROLOGY

## 2025-04-18 PROCEDURE — 3700000000 HC ANESTHESIA ATTENDED CARE

## 2025-04-18 PROCEDURE — 99285 EMERGENCY DEPT VISIT HI MDM: CPT

## 2025-04-18 PROCEDURE — 03CG3ZZ EXTIRPATION OF MATTER FROM INTRACRANIAL ARTERY, PERCUTANEOUS APPROACH: ICD-10-PCS | Performed by: PSYCHIATRY & NEUROLOGY

## 2025-04-18 PROCEDURE — 2580000003 HC RX 258: Performed by: EMERGENCY MEDICINE

## 2025-04-18 PROCEDURE — 2500000003 HC RX 250 WO HCPCS: Performed by: ANESTHESIOLOGY

## 2025-04-18 PROCEDURE — 2580000003 HC RX 258: Performed by: NURSE PRACTITIONER

## 2025-04-18 PROCEDURE — 7100000000 HC PACU RECOVERY - FIRST 15 MIN

## 2025-04-18 PROCEDURE — C1757 CATH, THROMBECTOMY/EMBOLECT: HCPCS

## 2025-04-18 PROCEDURE — 61645 PERQ ART M-THROMBECT &/NFS: CPT | Performed by: PSYCHIATRY & NEUROLOGY

## 2025-04-18 PROCEDURE — 85730 THROMBOPLASTIN TIME PARTIAL: CPT

## 2025-04-18 PROCEDURE — 0042T CT BRAIN PERFUSION: CPT

## 2025-04-18 PROCEDURE — 99291 CRITICAL CARE FIRST HOUR: CPT | Performed by: PSYCHIATRY & NEUROLOGY

## 2025-04-18 PROCEDURE — 7100000001 HC PACU RECOVERY - ADDTL 15 MIN

## 2025-04-18 PROCEDURE — 81001 URINALYSIS AUTO W/SCOPE: CPT

## 2025-04-18 PROCEDURE — 82805 BLOOD GASES W/O2 SATURATION: CPT

## 2025-04-18 PROCEDURE — 2500000003 HC RX 250 WO HCPCS

## 2025-04-18 PROCEDURE — 3700000001 HC ADD 15 MINUTES (ANESTHESIA)

## 2025-04-18 PROCEDURE — 6360000004 HC RX CONTRAST MEDICATION: Performed by: EMERGENCY MEDICINE

## 2025-04-18 PROCEDURE — 2060000000 HC ICU INTERMEDIATE R&B

## 2025-04-18 PROCEDURE — G0427 INPT/ED TELECONSULT70: HCPCS | Performed by: PSYCHIATRY & NEUROLOGY

## 2025-04-18 PROCEDURE — 80053 COMPREHEN METABOLIC PANEL: CPT

## 2025-04-18 PROCEDURE — 74018 RADEX ABDOMEN 1 VIEW: CPT

## 2025-04-18 PROCEDURE — 85347 COAGULATION TIME ACTIVATED: CPT

## 2025-04-18 PROCEDURE — 6360000004 HC RX CONTRAST MEDICATION: Performed by: NURSE PRACTITIONER

## 2025-04-18 PROCEDURE — 85025 COMPLETE CBC W/AUTO DIFF WBC: CPT

## 2025-04-18 PROCEDURE — 2580000003 HC RX 258

## 2025-04-18 PROCEDURE — 85610 PROTHROMBIN TIME: CPT

## 2025-04-18 PROCEDURE — 2000000000 HC ICU R&B

## 2025-04-18 PROCEDURE — 70450 CT HEAD/BRAIN W/O DYE: CPT

## 2025-04-18 PROCEDURE — 6370000000 HC RX 637 (ALT 250 FOR IP): Performed by: EMERGENCY MEDICINE

## 2025-04-18 PROCEDURE — 61645 PERQ ART M-THROMBECT &/NFS: CPT

## 2025-04-18 RX ORDER — LABETALOL HYDROCHLORIDE 5 MG/ML
10 INJECTION, SOLUTION INTRAVENOUS
Status: DISCONTINUED | OUTPATIENT
Start: 2025-04-18 | End: 2025-04-18

## 2025-04-18 RX ORDER — ROCURONIUM BROMIDE 10 MG/ML
INJECTION, SOLUTION INTRAVENOUS
Status: DISCONTINUED | OUTPATIENT
Start: 2025-04-18 | End: 2025-04-18 | Stop reason: SDUPTHER

## 2025-04-18 RX ORDER — NICARDIPINE HYDROCHLORIDE 0.1 MG/ML
2.5-15 INJECTION INTRAVENOUS CONTINUOUS
Status: DISCONTINUED | OUTPATIENT
Start: 2025-04-18 | End: 2025-04-18

## 2025-04-18 RX ORDER — ASPIRIN 300 MG/1
300 SUPPOSITORY RECTAL ONCE
Status: COMPLETED | OUTPATIENT
Start: 2025-04-18 | End: 2025-04-18

## 2025-04-18 RX ORDER — PHENYLEPHRINE HCL IN 0.9% NACL 1 MG/10 ML
SYRINGE (ML) INTRAVENOUS
Status: DISCONTINUED | OUTPATIENT
Start: 2025-04-18 | End: 2025-04-18 | Stop reason: SDUPTHER

## 2025-04-18 RX ORDER — NICARDIPINE HYDROCHLORIDE 0.1 MG/ML
2.5-15 INJECTION INTRAVENOUS CONTINUOUS
Status: DISCONTINUED | OUTPATIENT
Start: 2025-04-18 | End: 2025-04-21

## 2025-04-18 RX ORDER — HEPARIN SODIUM 1000 [USP'U]/ML
INJECTION, SOLUTION INTRAVENOUS; SUBCUTANEOUS
Status: DISCONTINUED | OUTPATIENT
Start: 2025-04-18 | End: 2025-04-18 | Stop reason: SDUPTHER

## 2025-04-18 RX ORDER — IOPAMIDOL 755 MG/ML
75 INJECTION, SOLUTION INTRAVASCULAR
Status: COMPLETED | OUTPATIENT
Start: 2025-04-18 | End: 2025-04-18

## 2025-04-18 RX ORDER — LIDOCAINE HYDROCHLORIDE 10 MG/ML
INJECTION, SOLUTION EPIDURAL; INFILTRATION; INTRACAUDAL; PERINEURAL
Status: DISCONTINUED | OUTPATIENT
Start: 2025-04-18 | End: 2025-04-18 | Stop reason: SDUPTHER

## 2025-04-18 RX ORDER — ONDANSETRON 2 MG/ML
4 INJECTION INTRAMUSCULAR; INTRAVENOUS EVERY 6 HOURS PRN
Status: DISCONTINUED | OUTPATIENT
Start: 2025-04-18 | End: 2025-04-24

## 2025-04-18 RX ORDER — SODIUM CHLORIDE 0.9 % (FLUSH) 0.9 %
5-40 SYRINGE (ML) INJECTION EVERY 12 HOURS SCHEDULED
Status: DISCONTINUED | OUTPATIENT
Start: 2025-04-18 | End: 2025-04-24 | Stop reason: HOSPADM

## 2025-04-18 RX ORDER — GLYCOPYRROLATE 1 MG/5 ML
SYRINGE (ML) INTRAVENOUS
Status: DISCONTINUED | OUTPATIENT
Start: 2025-04-18 | End: 2025-04-18 | Stop reason: SDUPTHER

## 2025-04-18 RX ORDER — SODIUM CHLORIDE 9 MG/ML
INJECTION, SOLUTION INTRAVENOUS PRN
Status: DISCONTINUED | OUTPATIENT
Start: 2025-04-18 | End: 2025-04-24 | Stop reason: HOSPADM

## 2025-04-18 RX ORDER — DIPHENHYDRAMINE HYDROCHLORIDE 50 MG/ML
12.5 INJECTION, SOLUTION INTRAMUSCULAR; INTRAVENOUS
Status: DISCONTINUED | OUTPATIENT
Start: 2025-04-18 | End: 2025-04-18

## 2025-04-18 RX ORDER — SODIUM CHLORIDE 0.9 % (FLUSH) 0.9 %
5-40 SYRINGE (ML) INJECTION EVERY 12 HOURS SCHEDULED
Status: DISCONTINUED | OUTPATIENT
Start: 2025-04-18 | End: 2025-04-30 | Stop reason: HOSPADM

## 2025-04-18 RX ORDER — LORAZEPAM 2 MG/ML
0.5 INJECTION INTRAMUSCULAR
Status: DISCONTINUED | OUTPATIENT
Start: 2025-04-18 | End: 2025-04-18

## 2025-04-18 RX ORDER — SODIUM CHLORIDE 0.9 % (FLUSH) 0.9 %
5-40 SYRINGE (ML) INJECTION PRN
Status: DISCONTINUED | OUTPATIENT
Start: 2025-04-18 | End: 2025-04-24 | Stop reason: HOSPADM

## 2025-04-18 RX ORDER — IOPAMIDOL 755 MG/ML
50 INJECTION, SOLUTION INTRAVASCULAR
Status: COMPLETED | OUTPATIENT
Start: 2025-04-18 | End: 2025-04-18

## 2025-04-18 RX ORDER — PHENYLEPHRINE HCL IN 0.9% NACL 50MG/250ML
PLASTIC BAG, INJECTION (ML) INTRAVENOUS
Status: DISCONTINUED | OUTPATIENT
Start: 2025-04-18 | End: 2025-04-18 | Stop reason: SDUPTHER

## 2025-04-18 RX ORDER — ONDANSETRON 2 MG/ML
4 INJECTION INTRAMUSCULAR; INTRAVENOUS EVERY 6 HOURS PRN
Status: DISCONTINUED | OUTPATIENT
Start: 2025-04-18 | End: 2025-04-18

## 2025-04-18 RX ORDER — SODIUM CHLORIDE 9 MG/ML
INJECTION, SOLUTION INTRAVENOUS CONTINUOUS
Status: DISCONTINUED | OUTPATIENT
Start: 2025-04-18 | End: 2025-04-18

## 2025-04-18 RX ORDER — ATORVASTATIN CALCIUM 80 MG/1
80 TABLET, FILM COATED ORAL NIGHTLY
Status: DISCONTINUED | OUTPATIENT
Start: 2025-04-18 | End: 2025-04-21

## 2025-04-18 RX ORDER — SODIUM CHLORIDE 9 MG/ML
INJECTION, SOLUTION INTRAVENOUS
Status: DISCONTINUED | OUTPATIENT
Start: 2025-04-18 | End: 2025-04-18 | Stop reason: SDUPTHER

## 2025-04-18 RX ORDER — OXYCODONE HYDROCHLORIDE 5 MG/1
5 TABLET ORAL PRN
Status: DISCONTINUED | OUTPATIENT
Start: 2025-04-18 | End: 2025-04-18

## 2025-04-18 RX ORDER — HYDRALAZINE HYDROCHLORIDE 20 MG/ML
10 INJECTION INTRAMUSCULAR; INTRAVENOUS
Status: DISCONTINUED | OUTPATIENT
Start: 2025-04-18 | End: 2025-04-18

## 2025-04-18 RX ORDER — ONDANSETRON 4 MG/1
4 TABLET, ORALLY DISINTEGRATING ORAL EVERY 8 HOURS PRN
Status: DISCONTINUED | OUTPATIENT
Start: 2025-04-18 | End: 2025-04-18

## 2025-04-18 RX ORDER — HYDRALAZINE HYDROCHLORIDE 20 MG/ML
10 INJECTION INTRAMUSCULAR; INTRAVENOUS
Status: DISCONTINUED | OUTPATIENT
Start: 2025-04-18 | End: 2025-04-30 | Stop reason: HOSPADM

## 2025-04-18 RX ORDER — OXYCODONE HYDROCHLORIDE 5 MG/1
10 TABLET ORAL PRN
Status: DISCONTINUED | OUTPATIENT
Start: 2025-04-18 | End: 2025-04-18

## 2025-04-18 RX ORDER — IODIXANOL 270 MG/ML
100 INJECTION, SOLUTION INTRAVASCULAR
Status: COMPLETED | OUTPATIENT
Start: 2025-04-18 | End: 2025-04-18

## 2025-04-18 RX ORDER — DROPERIDOL 2.5 MG/ML
0.62 INJECTION, SOLUTION INTRAMUSCULAR; INTRAVENOUS
Status: DISCONTINUED | OUTPATIENT
Start: 2025-04-18 | End: 2025-04-18

## 2025-04-18 RX ORDER — ONDANSETRON 4 MG/1
4 TABLET, ORALLY DISINTEGRATING ORAL EVERY 8 HOURS PRN
Status: DISCONTINUED | OUTPATIENT
Start: 2025-04-18 | End: 2025-04-24

## 2025-04-18 RX ORDER — ONDANSETRON 2 MG/ML
INJECTION INTRAMUSCULAR; INTRAVENOUS
Status: DISCONTINUED | OUTPATIENT
Start: 2025-04-18 | End: 2025-04-18 | Stop reason: SDUPTHER

## 2025-04-18 RX ORDER — SODIUM CHLORIDE, SODIUM LACTATE, POTASSIUM CHLORIDE, CALCIUM CHLORIDE 600; 310; 30; 20 MG/100ML; MG/100ML; MG/100ML; MG/100ML
INJECTION, SOLUTION INTRAVENOUS
Status: DISCONTINUED | OUTPATIENT
Start: 2025-04-18 | End: 2025-04-18 | Stop reason: SDUPTHER

## 2025-04-18 RX ORDER — POLYETHYLENE GLYCOL 3350 17 G/17G
17 POWDER, FOR SOLUTION ORAL DAILY PRN
Status: DISCONTINUED | OUTPATIENT
Start: 2025-04-18 | End: 2025-04-21

## 2025-04-18 RX ORDER — SODIUM CHLORIDE 9 MG/ML
INJECTION, SOLUTION INTRAVENOUS CONTINUOUS
Status: DISCONTINUED | OUTPATIENT
Start: 2025-04-18 | End: 2025-04-30 | Stop reason: HOSPADM

## 2025-04-18 RX ORDER — SODIUM CHLORIDE 9 MG/ML
INJECTION, SOLUTION INTRAVENOUS PRN
Status: DISCONTINUED | OUTPATIENT
Start: 2025-04-18 | End: 2025-04-30 | Stop reason: HOSPADM

## 2025-04-18 RX ORDER — 0.9 % SODIUM CHLORIDE 0.9 %
1000 INTRAVENOUS SOLUTION INTRAVENOUS ONCE
Status: COMPLETED | OUTPATIENT
Start: 2025-04-18 | End: 2025-04-18

## 2025-04-18 RX ORDER — PROPOFOL 10 MG/ML
INJECTION, EMULSION INTRAVENOUS
Status: DISCONTINUED | OUTPATIENT
Start: 2025-04-18 | End: 2025-04-18 | Stop reason: SDUPTHER

## 2025-04-18 RX ORDER — FENTANYL CITRATE 50 UG/ML
25 INJECTION, SOLUTION INTRAMUSCULAR; INTRAVENOUS EVERY 5 MIN PRN
Status: DISCONTINUED | OUTPATIENT
Start: 2025-04-18 | End: 2025-04-18

## 2025-04-18 RX ORDER — LABETALOL HYDROCHLORIDE 5 MG/ML
INJECTION, SOLUTION INTRAVENOUS
Status: DISCONTINUED | OUTPATIENT
Start: 2025-04-18 | End: 2025-04-18 | Stop reason: SDUPTHER

## 2025-04-18 RX ORDER — NALOXONE HYDROCHLORIDE 0.4 MG/ML
INJECTION, SOLUTION INTRAMUSCULAR; INTRAVENOUS; SUBCUTANEOUS PRN
Status: DISCONTINUED | OUTPATIENT
Start: 2025-04-18 | End: 2025-04-18

## 2025-04-18 RX ORDER — LABETALOL HYDROCHLORIDE 5 MG/ML
10 INJECTION, SOLUTION INTRAVENOUS
Status: DISCONTINUED | OUTPATIENT
Start: 2025-04-18 | End: 2025-04-30 | Stop reason: HOSPADM

## 2025-04-18 RX ORDER — PROCHLORPERAZINE EDISYLATE 5 MG/ML
5 INJECTION INTRAMUSCULAR; INTRAVENOUS
Status: DISCONTINUED | OUTPATIENT
Start: 2025-04-18 | End: 2025-04-24 | Stop reason: HOSPADM

## 2025-04-18 RX ORDER — SODIUM CHLORIDE 0.9 % (FLUSH) 0.9 %
5-40 SYRINGE (ML) INJECTION PRN
Status: DISCONTINUED | OUTPATIENT
Start: 2025-04-18 | End: 2025-04-30 | Stop reason: HOSPADM

## 2025-04-18 RX ADMIN — NICARDIPINE HYDROCHLORIDE 5 MG/HR: 0.1 INJECTION INTRAVENOUS at 16:30

## 2025-04-18 RX ADMIN — Medication 50 MCG: at 15:49

## 2025-04-18 RX ADMIN — ONDANSETRON 4 MG: 2 INJECTION, SOLUTION INTRAMUSCULAR; INTRAVENOUS at 16:14

## 2025-04-18 RX ADMIN — SODIUM CHLORIDE, PRESERVATIVE FREE 10 ML: 5 INJECTION INTRAVENOUS at 21:16

## 2025-04-18 RX ADMIN — SODIUM CHLORIDE: 0.9 INJECTION, SOLUTION INTRAVENOUS at 18:33

## 2025-04-18 RX ADMIN — Medication 5 MG: at 16:26

## 2025-04-18 RX ADMIN — Medication 50 MCG: at 15:45

## 2025-04-18 RX ADMIN — Medication 100 MCG: at 15:36

## 2025-04-18 RX ADMIN — Medication 50 MCG: at 16:03

## 2025-04-18 RX ADMIN — HEPARIN SODIUM 2000 UNITS: 1000 INJECTION, SOLUTION INTRAVENOUS; SUBCUTANEOUS at 15:36

## 2025-04-18 RX ADMIN — IOPAMIDOL 50 ML: 755 INJECTION, SOLUTION INTRAVENOUS at 13:45

## 2025-04-18 RX ADMIN — Medication 50 MCG: at 15:59

## 2025-04-18 RX ADMIN — Medication 5 MG: at 16:30

## 2025-04-18 RX ADMIN — SUGAMMADEX 200 MG: 100 INJECTION, SOLUTION INTRAVENOUS at 16:27

## 2025-04-18 RX ADMIN — Medication 2 G: at 15:30

## 2025-04-18 RX ADMIN — ROCURONIUM BROMIDE 50 MG: 10 INJECTION, SOLUTION INTRAVENOUS at 15:02

## 2025-04-18 RX ADMIN — Medication 0.2 MG: at 15:40

## 2025-04-18 RX ADMIN — SODIUM CHLORIDE 1000 ML: 0.9 INJECTION, SOLUTION INTRAVENOUS at 12:18

## 2025-04-18 RX ADMIN — SODIUM CHLORIDE: 9 INJECTION, SOLUTION INTRAVENOUS at 14:55

## 2025-04-18 RX ADMIN — PROPOFOL 150 MG: 10 INJECTION, EMULSION INTRAVENOUS at 15:02

## 2025-04-18 RX ADMIN — Medication 20 MCG/MIN: at 15:47

## 2025-04-18 RX ADMIN — NICARDIPINE HYDROCHLORIDE 5 MG/HR: 0.1 INJECTION INTRAVENOUS at 18:30

## 2025-04-18 RX ADMIN — IODIXANOL 71 ML: 270 INJECTION, SOLUTION INTRAVASCULAR at 16:22

## 2025-04-18 RX ADMIN — ASPIRIN 300 MG: 300 SUPPOSITORY RECTAL at 12:12

## 2025-04-18 RX ADMIN — LIDOCAINE HYDROCHLORIDE 50 MG: 10 INJECTION, SOLUTION EPIDURAL; INFILTRATION; INTRACAUDAL; PERINEURAL at 15:02

## 2025-04-18 RX ADMIN — IOPAMIDOL 75 ML: 755 INJECTION, SOLUTION INTRAVENOUS at 10:48

## 2025-04-18 RX ADMIN — SODIUM CHLORIDE, POTASSIUM CHLORIDE, SODIUM LACTATE AND CALCIUM CHLORIDE: 600; 310; 30; 20 INJECTION, SOLUTION INTRAVENOUS at 16:02

## 2025-04-18 RX ADMIN — SODIUM CHLORIDE, POTASSIUM CHLORIDE, SODIUM LACTATE AND CALCIUM CHLORIDE: 600; 310; 30; 20 INJECTION, SOLUTION INTRAVENOUS at 15:19

## 2025-04-18 NOTE — ED NOTES
Spoke with Deangelo Sexton from [facility], they would like to be contacted for any furthers consents needed and updated after any procedures.

## 2025-04-18 NOTE — ANESTHESIA PRE PROCEDURE
Department of Anesthesiology  Preprocedure Note       Name:  Carrington Mulligan   Age:  69 y.o.  :  1955                                          MRN:  [id number]         Date:  2025      Surgeon: * No surgeons listed *    Procedure: * No procedures listed * ANGEL    Medications prior to admission:   Prior to Admission medications    Medication Sig Start Date End Date Taking? Authorizing Provider   neomycin-bacitracin-polymyxin (NEOSPORIN) 3.5-400-5000 OINT ointment APPLY TO AFFECTED AREA(S) TOPICALLY 4 TIMES DAILY AS NEEDED 25   John Santacruz MD   levoFLOXacin (LEVAQUIN) 500 MG tablet Take 1 tablet by mouth daily for 7 days 25  John Santacruz MD   psyllium (REGULOID) 400 MG capsule Take 1 capsule by mouth in the morning and at bedtime    Tito Rojas MD   chlorhexidine (PERIDEX) 0.12 % solution (REQUEST REFILL WHEN NEEDED) USE ORALLY TO RINSE TWICE DAILY WITH COTTON SWAB 3/21/25   John Santacruz MD   sertraline (ZOLOFT) 100 MG tablet Take 1 tablet by mouth daily 25   Tito Rojas MD   mirtazapine (REMERON) 15 MG tablet Take 1 tablet by mouth nightly Start Remeron 7.5 mg nightly for 2 weeks then after that 2 tablets nightly 2 weeks. 3/4/25   John Santacruz MD   Calcium Carb-Cholecalciferol (OYSTER SHELL CALCIUM W/D) 500-5 MG-MCG TABS tablet TAKE 1 TABLET BY MOUTH TWICE DAILY 24   John Santacruz MD   omeprazole (PRILOSEC) 20 MG delayed release capsule TAKE 2 CAPSULES (40MG) BY MOUTH ONCE EVERY DAY 24   John Santacruz MD   oxyBUTYnin (DITROPAN-XL) 5 MG extended release tablet TAKE 1 TABLET BY MOUTH ONCE EVERY DAY 24   John Santacruz MD   cetirizine (ZYRTEC) 10 MG tablet Take 1 tablet by mouth daily 24   John Santacruz MD   Acetaminophen Extra Strength 500 MG TABS TAKE 2 TABLETS (1000MG) BY MOUTH EVERY 8 HOURS AS NEEDED FOR PAIN OR FEVER GREATER THAN 100.4 *MAX 6 TABS IN 12HRS* 24   John Santacruz MD

## 2025-04-18 NOTE — BRIEF OP NOTE
[facility]    NEUROENDOVASCULAR SERVICE: POST-OP NOTE: 4/18/2025    Pt Name: Carrington Mulligan  MRN: [id number]  YOB: 1955  Date of Procedure: 4/18/2025  Primary Care Physician: John Santacruz MD  Referring Physician:Dr. Patrick MD      Pre-Procedural Diagnosis: L distal M1 occlusion  Post-Procedural Diagnosis:Same, s/p thrombectomy      Procedure Performed:Cerebral angiogram with mechanical thrombectomy of the L M1    Surgeon:   Leland Ott MD    Fellow:  Keshawn Jones MD PhD     Assisting Tech:  Amy Rasheed    PRE-PROCEDURAL EXAM:  Prestroke baseline mRS MODIFIED BRIGIDA SCORE: 4 - Moderate severe disability:  unable to walk or attend to own bodily needs without assistance.  Neurological exam performed and unchanged from initial H&P or consult  MODIFIED BRIGIDA SCORE: 4 - Moderate severe disability:  unable to walk or attend to own bodily needs without assistance.  CT head ASPECT score: 9    Anesthesia: General Anesthesia  An Immediate re-assessment was completed prior to sedation, and it is determined to be safe to proceed.  Complications: none    Intra-Operative EXAM:  Patient sedated with unchanged limited neurological exam    Patient arrived and wheeled in to the angio suite at: 1445  Monitoring and administration of sedation started at: 145  Puncture obtained at: 1533  Vascular access was removed at: 1615  Manual pressure for minutes: 15 mins  Sedation ended at: 1645  Patient wheeled out of the angio suite at: 1645    Heparin given: 2000 units  EBL: < Minimal      Cc            Specimens: Were not Obtained  Contrast:     Visipaque 270 low osmolar 71 Cc             Fluoro: 16.5 min    Findings:  Please see dictated Radiology note for further details  Selective angiograms from the L CCA, L ICA, and R femoral.  L distal M1 occlusion TICI 0  Above treated with 8F short sheath, 8F [place] BGC, Red 72, Send-IT, SL-10, Synchro, and Solitaire 4x40 SR. First pass TICI 3

## 2025-04-18 NOTE — ED NOTES
Spoke with pt legal guardian.   Updated on plan of care.   [employer] legal guardian. Brad Gillig works for [employer]. Please [facility] [phone] [facility] after comes out of the OR.   After hours [phone] after hours emergency line.

## 2025-04-18 NOTE — SEDATION DOCUMENTATION
Pt transported to recovery on monitor with CRNA. Vss. Puncture site is clean, dry, and intact with safeguard on top. Pedal pulses palpable.  Post Procedure Transfer from IR Table  [x] Tubes and Lines intact     Post Procedure Neuro-Checks/NIHSS/Vitals  [x] Completed post procedure  [x] Completed bedside handoff  [x] Frequency ordered  [x] Verbal communication of frequency      Post Procedure Puncture Site Checks  [x] Completed post procedure  [x] Completed bedside handoff  [x] Frequency ordered  [x] Verbal communication of frequency    Post Procedure Pulse  Checks  [x] Completed post procedure  [x] Completed bedside handoff  [x] Frequency ordered  [x] Verbal communication of frequency    Order Set  [x] Post Neuro-Endo Procedure  [] Stroke  [] t-PA     B/P control  [x] Verbal Communication   [x] Order in Care Path    Medication Review  [x] Given during procedure  [x] Current drips/meds/fluids    [x] Physician Notified of All changes in Assessment    Family  [] Location Post Procedure

## 2025-04-18 NOTE — ED PROVIDER NOTES
[facility]  EMERGENCY DEPARTMENT ENCOUNTER      Pt Name: Carrington Mulligan  MRN: [id number]  Birthdate 1955  Date of evaluation: 4/18/2025  Provider: Gail Haney MD    CHIEF COMPLAINT       Chief Complaint   Patient presents with    Fall     Patient has not been acting himself for the past few days. Patient fell this AM out of bed and then fell off of the toilet. Per staff, patient is continuing to lean toward the right side. Patient having balance issue and not acting appropriately per staff.         HISTORY OF PRESENT ILLNESS   (Location/Symptom, Timing/Onset, Context/Setting, Quality, Duration, Modifying Factors, Severity)  Note limiting factors.   Carrington Mulligan is a 69 y.o. male who presents to the emergency department      69-year-old male autistic nonverbal male history of MRDD brought from his group home for altered mental status.  Caretaker with patient states last known to be his normal self was between 9 and 10 PM yesterday.  She does report that over a several week period the patient seems to have been becoming less alert and interactive.  He did reportedly fall from bed around 430 or so this morning but was put back into bed it was then noticed whether he had any focal deficits at this time they did have difficulty getting him up and attending to his usual routine this morning which is why they brought him to the emergency department.  On arrival the patient is breathing spontaneously.  Does open his eyes to tactile stimulation.  He has no movement of his right upper or lower extremity and t        Nursing Notes were reviewed.    REVIEW OF SYSTEMS    (2-9 systems for level 4, 10 or more for level 5)     Review of Systems   All other systems reviewed and are negative.      Generalized weakness as per guardian.  Unable to obtain review of systems due to patient's MRDD and nonverbal status    PAST MEDICAL HISTORY     Past Medical History:   Diagnosis Date    Allergic rhinitis      Autism     Epilepsy     GERD (gastroesophageal reflux disease)     Hypertriglyceridemia     Mental retardation     Occupational circumstances     Bland Re-Ads    Seizures (HCC)          SURGICAL HISTORY       Past Surgical History:   Procedure Laterality Date    COLONOSCOPY  2012    Polyps    FINGER NAIL SURGERY Right 7/29/2021    NAILBED EXCISION MATRIXECTOMY, HALLUX TOTAL AVULSION performed by Vikram Seo DPM at [facility] OR    OTHER SURGICAL HISTORY Right 07/29/2021    NAILBED EXCISION MATRIXECTOMY, HALLUX TOTAL AVULSION (Right )- Dr Seo          CURRENT MEDICATIONS       Previous Medications    ACETAMINOPHEN EXTRA STRENGTH 500 MG TABS    TAKE 2 TABLETS (1000MG) BY MOUTH EVERY 8 HOURS AS NEEDED FOR PAIN OR FEVER GREATER THAN 100.4 *MAX 6 TABS IN 12HRS*    CALCIUM CARB-CHOLECALCIFEROL (OYSTER SHELL CALCIUM W/D) 500-5 MG-MCG TABS TABLET    TAKE 1 TABLET BY MOUTH TWICE DAILY    CETIRIZINE (ZYRTEC) 10 MG TABLET    Take 1 tablet by mouth daily    CHLORHEXIDINE (PERIDEX) 0.12 % SOLUTION    (REQUEST REFILL WHEN NEEDED) USE ORALLY TO RINSE TWICE DAILY WITH COTTON SWAB    DISPOSABLE GLOVES (VINYL GLOVES LARGE) MISC    1 PAIR AS DIRECTED QD-BID    DISPOSABLE GLOVES MISC    1 pair as directed QD-BID    INCONTINENCE SUPPLY DISPOSABLE (FQ PROTECTIVE UNDERWEAR) MISC    USE AS DIRECTED FOR INCONTINENCE ([phone] *CALL PHARMACY FOR REFILLS*    SIZE SMALL/MEDIUM    LEVOFLOXACIN (LEVAQUIN) 500 MG TABLET    Take 1 tablet by mouth daily for 7 days    MIRTAZAPINE (REMERON) 15 MG TABLET    Take 1 tablet by mouth nightly Start Remeron 7.5 mg nightly for 2 weeks then after that 2 tablets nightly 2 weeks.    NEOMYCIN-BACITRACIN-POLYMYXIN (NEOSPORIN) 3.5-400-5000 OINT OINTMENT    APPLY TO AFFECTED AREA(S) TOPICALLY 4 TIMES DAILY AS NEEDED    OMEPRAZOLE (PRILOSEC) 20 MG DELAYED RELEASE CAPSULE    TAKE 2 CAPSULES (40MG) BY MOUTH ONCE EVERY DAY    OXYBUTYNIN (DITROPAN-XL) 5 MG EXTENDED RELEASE TABLET    TAKE 1 TABLET BY MOUTH ONCE EVERY

## 2025-04-18 NOTE — ED PROVIDER NOTES
[facility]  Emergency Department  Faculty Attestation     I performed a history and physical examination of the patient and discussed management with the resident. I reviewed the resident’s note and agree with the documented findings and plan of care. Any areas of disagreement are noted on the chart. I was personally present for the key portions of any procedures. I have documented in the chart those procedures where I was not present during the key portions. I have reviewed the emergency nurses triage note. I agree with the chief complaint, past medical history, past surgical history, allergies, medications, social and family history as documented unless otherwise noted below.    For Physician Assistant/ Nurse Practitioner cases/documentation I have personally evaluated this patient and have completed at least one if not all key elements of the E/M (history, physical exam, and MDM). Additional findings are as noted.    Preliminary note started at 1:20 PM EDT    Primary Care Physician:  John Santacruz MD    Screenings:  [unfilled]    CHIEF COMPLAINT       Chief Complaint   Patient presents with    Cerebrovascular Accident       RECENT VITALS:   BP (!) 164/118   Pulse 71   Temp 98.5 °F (36.9 °C) (Oral)   Resp 20   SpO2 97%     LABS:  Labs Reviewed - No data to display    Radiology  No orders to display       CRITICAL CARE: There was a high probability of clinically significant/life threatening deterioration in this patient's condition which required my urgent intervention.  Total critical care time was 5 minutes.  This excludes any time for separately reportable procedures.     EKG:      Attending Physician Additional  Notes    Patient is transferred from outside hospital for left MCA stroke.  Last known well was approximately 10 PM last night.  He has new right hemiparesis and leftward gaze.  He was found fallen from the bed at 430 this morning but was placed back 
[facility]     Emergency Department     Faculty Attestation    I performed a history and physical examination of the patient and discussed management with the resident. I reviewed the resident's note and agree with the documented findings and plan of care. Any areas of disagreement are noted on the chart. I was personally present for the key portions of any procedures. I have documented in the chart those procedures where I was not present during the key portions. I have reviewed the emergency nurses triage note. I agree with the chief complaint, past medical history, past surgical history, allergies, medications, social and family history as documented unless otherwise noted below. For Physician Assistant/ Nurse Practitioner cases/documentation I have personally evaluated this patient and have completed at least one if not all key elements of the E/M (history, physical exam, and MDM). Additional findings are as noted.    Transfer note:  69-year-old male Carrington Mulligan, last known well 10 PM, CVA by CTA, Dr. Ott aware of the patient and wants to take him to the lab     Deangelo Lowe MD  04/18/25 5050    
hospitalization.      EMERGENCY DEPARTMENT COURSE:    Patient admitted to neuro ICU.  Plan for intervention with neuroendovascular.       PROCEDURES:      CONSULTS:  IP CONSULT TO STROKE TEAM  IP CONSULT TO CASE MANAGEMENT    CRITICAL CARE:  There was significant risk of life threatening deterioration of patient's condition requiring my direct management. Critical care time 0 minutes, excluding any documented procedures.    FINAL IMPRESSION      1. Cerebrovascular accident (CVA), unspecified mechanism (HCC)          DISPOSITION / PLAN     DISPOSITION Admitted 04/18/2025 01:52:04 PM           PATIENT REFERRED TO:  No follow-up provider specified.    DISCHARGE MEDICATIONS:  New Prescriptions    No medications on file       Regina Davila DO  Emergency Medicine Resident    (Please note that portions of this note were completed with a voice recognition program.  Efforts were made to edit the dictations but occasionally words are mis-transcribed.)

## 2025-04-18 NOTE — ED NOTES
[facility] called back with Dr Ott from [facility], connected call to Dr Haney

## 2025-04-18 NOTE — SEDATION DOCUMENTATION
Pt arrives to neuro IR nonverbal and unable to follow commands. Opens eyes to name. Moving LUE LLE spontaneously.  Pt intubated and left radial art line placed per anesthesia   Pt's brief was saturated in urine, malodorous and excoriation throughout art area. Art care provided and kamara catheter placed.  Pedal pulses marked and palpable    Anesthesia:  GA / CS  / MAC     Attending; Dr Ott    Arrival time  tx from [place]    KOSTAS; 10 pm 4/17/2025  TPA; bolus   infusion; OOW    Brain cath activation time:    Wheel in lab time:  1445  Groin puncture:  1530  Right femoral artery access 1533    15:33 2000 units heparin per verbal order Dr Ott  1544 10mg nicardipine added to red flush bag per verbal order dr Ott    TICI Baseline:     1st run  Deployment time: 1604 stenttrever at clot  1606 suction with 60cc syringe  1606 full suction  1st run Retrieval time: 1607 reddish white clot obtained  1st pass TICI score: TICI 3    Closure time: 1621 angioseal deployed. Manual pressure held    Arrival - groin access time:  Arrival - open time:  LKW-open time:

## 2025-04-18 NOTE — ED NOTES
69-year-old male Carrington Mulligan, last known well 10 PM, CVA by CTA, Dr. Ott aware of the patient and wants to take him to the lab

## 2025-04-18 NOTE — VIRTUAL HEALTH
[facility] Stroke and Telestroke Consult for  New Stroke Alert through [employer] @ 11:51  4/18/2025 12:19 PM    Pt Name: Carrington Mulligan  MRN: [id number]  YOB: 1955  Date of evaluation: 4/18/2025  Primary Care Physician: John Santacruz MD  Reason for Evaluation: Stroke Evaluation with Discussion with Ed or primary team with Telemedicine and stroke evaluation with Review of imaging and labs    Carrington Mulligan is a 69 y.o. male who presents with history of htn, hld, autism, seizures with no episodes in the past ten years, nonverbal at [facility] with legal guardian [phone] Deangelo Sexton who gave consent for cerebral angiogram with possible intervention. Patient with nih 8 for new right hemiparesis.      LKW: last night  NIH:  8    Allergies  has no known allergies.  Medications  Prior to Admission medications    Medication Sig Start Date End Date Taking? Authorizing Provider   levoFLOXacin (LEVAQUIN) 500 MG tablet Take 1 tablet by mouth daily for 7 days 4/14/25 4/21/25 Yes John Santacruz MD   psyllium (REGULOID) 400 MG capsule Take 1 capsule by mouth in the morning and at bedtime   Yes Provider, MD Tito   sertraline (ZOLOFT) 100 MG tablet Take 1 tablet by mouth daily 2/25/25  Yes ProviderTito MD   mirtazapine (REMERON) 15 MG tablet Take 1 tablet by mouth nightly Start Remeron 7.5 mg nightly for 2 weeks then after that 2 tablets nightly 2 weeks. 3/4/25  Yes John Santacruz MD   Calcium Carb-Cholecalciferol (OYSTER SHELL CALCIUM W/D) 500-5 MG-MCG TABS tablet TAKE 1 TABLET BY MOUTH TWICE DAILY 11/13/24  Yes John Santacruz MD   omeprazole (PRILOSEC) 20 MG delayed release capsule TAKE 2 CAPSULES (40MG) BY MOUTH ONCE EVERY DAY 11/13/24  Yes John Santacruz MD   oxyBUTYnin (DITROPAN-XL) 5 MG extended release tablet TAKE 1 TABLET BY MOUTH ONCE EVERY DAY 11/13/24  Yes John Santacruz MD   cetirizine (ZYRTEC) 10 MG tablet Take 1 tablet by mouth  the nursing staff regarding recommendations      [facility] EMERGENCY DEPT      Leland Ott MD, MD   Stroke, Neurocritical Care And/or Neurointervention  [facility] Stroke [facility] Stroke [facility] [place]  Electronically signed 4/18/2025 at 3:19 PM      Carrington Mulligan, was evaluated through a synchronous (real-time) audio-video encounter. The patient (and/or guardian if applicable) is aware that this is a billable service, which includes applicable co-pays. This virtual visit was conducted with patient's (and/or legal guardian's) consent. Patient identification was verified, and a caregiver was present when appropriate.  The patient was located at Facility (Appt Department): [facility] EMERGENCY DEPARTMENT  [address] [place] [address]  Loc: [phone]  The provider was located at Facility (Login Dept): [address] [facility]  [facility] - GROUND FLOOR, [address] [address] [phone]    Total time spent on this encounter:  70 min    --Leland Ott MD on 4/18/2025 at 3:18 PM    An electronic signature was used to authenticate this note.

## 2025-04-18 NOTE — ED NOTES
Pt arrives to ed via LF, transfer from [facility].   Pt lives at a nursing home, is autistic, non verbal at baseline. Pt LKW was between 9-10 pm last night. Pt was found by staff on the floor this am, then when pt was assisted to the toilet he fell again. Pt was noted to be leaning to the right with unsteady gait.   Per outside facility charting pt has not been acting normally the past several days.   Pt is non verbal, does not follow commands.

## 2025-04-19 ENCOUNTER — APPOINTMENT (OUTPATIENT)
Dept: GENERAL RADIOLOGY | Age: 70
DRG: 023 | End: 2025-04-19
Payer: MEDICARE

## 2025-04-19 ENCOUNTER — APPOINTMENT (OUTPATIENT)
Dept: CT IMAGING | Age: 70
DRG: 023 | End: 2025-04-19
Payer: MEDICARE

## 2025-04-19 PROBLEM — I63.9 CEREBROVASCULAR ACCIDENT (CVA) (HCC): Status: ACTIVE | Noted: 2025-04-19

## 2025-04-19 LAB
ANION GAP SERPL CALCULATED.3IONS-SCNC: 9 MMOL/L (ref 9–16)
BACTERIA URNS QL MICRO: NORMAL
BASOPHILS # BLD: 0.07 K/UL (ref 0–0.2)
BASOPHILS NFR BLD: 1 % (ref 0–2)
BILIRUB UR QL STRIP: NEGATIVE
BUN SERPL-MCNC: 16 MG/DL (ref 8–23)
CALCIUM SERPL-MCNC: 8.9 MG/DL (ref 8.6–10.4)
CASTS #/AREA URNS LPF: NORMAL /LPF (ref 0–8)
CHLORIDE SERPL-SCNC: 111 MMOL/L (ref 98–107)
CHOLEST SERPL-MCNC: 90 MG/DL (ref 0–199)
CHOLESTEROL/HDL RATIO: 2.9
CLARITY UR: CLEAR
CO2 SERPL-SCNC: 20 MMOL/L (ref 20–31)
COLOR UR: YELLOW
CREAT SERPL-MCNC: 0.8 MG/DL (ref 0.7–1.2)
EKG ATRIAL RATE: 86 BPM
EKG P AXIS: 55 DEGREES
EKG P-R INTERVAL: 194 MS
EKG Q-T INTERVAL: 362 MS
EKG QRS DURATION: 94 MS
EKG QTC CALCULATION (BAZETT): 433 MS
EKG R AXIS: 41 DEGREES
EKG T AXIS: 57 DEGREES
EKG VENTRICULAR RATE: 86 BPM
EOSINOPHIL # BLD: 0.1 K/UL (ref 0–0.44)
EOSINOPHILS RELATIVE PERCENT: 1 % (ref 1–4)
EPI CELLS #/AREA URNS HPF: NORMAL /HPF (ref 0–5)
ERYTHROCYTE [DISTWIDTH] IN BLOOD BY AUTOMATED COUNT: 14 % (ref 11.8–14.4)
EST. AVERAGE GLUCOSE BLD GHB EST-MCNC: 88 MG/DL
GFR, ESTIMATED: >90 ML/MIN/1.73M2
GLUCOSE SERPL-MCNC: 99 MG/DL (ref 74–99)
GLUCOSE UR STRIP-MCNC: NEGATIVE MG/DL
HBA1C MFR BLD: 4.7 % (ref 4–6)
HCT VFR BLD AUTO: 35.7 % (ref 40.7–50.3)
HDLC SERPL-MCNC: 31 MG/DL
HGB BLD-MCNC: 11.6 G/DL (ref 13–17)
HGB UR QL STRIP.AUTO: NEGATIVE
IMM GRANULOCYTES # BLD AUTO: 0.04 K/UL (ref 0–0.3)
IMM GRANULOCYTES NFR BLD: 0 %
KETONES UR STRIP-MCNC: ABNORMAL MG/DL
LDLC SERPL CALC-MCNC: 47 MG/DL (ref 0–100)
LEUKOCYTE ESTERASE UR QL STRIP: ABNORMAL
LYMPHOCYTES NFR BLD: 1.01 K/UL (ref 1.1–3.7)
LYMPHOCYTES RELATIVE PERCENT: 10 % (ref 24–43)
MAGNESIUM SERPL-MCNC: 2.2 MG/DL (ref 1.6–2.4)
MCH RBC QN AUTO: 29.1 PG (ref 25.2–33.5)
MCHC RBC AUTO-ENTMCNC: 32.5 G/DL (ref 28.4–34.8)
MCV RBC AUTO: 89.5 FL (ref 82.6–102.9)
MONOCYTES NFR BLD: 0.51 K/UL (ref 0.1–1.2)
MONOCYTES NFR BLD: 5 % (ref 3–12)
NEUTROPHILS NFR BLD: 83 % (ref 36–65)
NEUTS SEG NFR BLD: 8.51 K/UL (ref 1.5–8.1)
NITRITE UR QL STRIP: NEGATIVE
NRBC BLD-RTO: 0 PER 100 WBC
PH UR STRIP: 5.5 [PH] (ref 5–8)
PLATELET # BLD AUTO: 178 K/UL (ref 138–453)
PMV BLD AUTO: 11.5 FL (ref 8.1–13.5)
POTASSIUM SERPL-SCNC: 3.5 MMOL/L (ref 3.7–5.3)
PROT UR STRIP-MCNC: ABNORMAL MG/DL
RBC # BLD AUTO: 3.99 M/UL (ref 4.21–5.77)
RBC #/AREA URNS HPF: NORMAL /HPF (ref 0–4)
SODIUM SERPL-SCNC: 140 MMOL/L (ref 136–145)
SP GR UR STRIP: 1.02 (ref 1–1.03)
TRIGL SERPL-MCNC: 60 MG/DL
TSH SERPL DL<=0.05 MIU/L-ACNC: 0.63 UIU/ML (ref 0.27–4.2)
UROBILINOGEN UR STRIP-ACNC: NORMAL EU/DL (ref 0–1)
VLDLC SERPL CALC-MCNC: 12 MG/DL (ref 1–30)
WBC #/AREA URNS HPF: NORMAL /HPF (ref 0–5)
WBC OTHER # BLD: 10.2 K/UL (ref 3.5–11.3)

## 2025-04-19 PROCEDURE — 83735 ASSAY OF MAGNESIUM: CPT

## 2025-04-19 PROCEDURE — 80061 LIPID PANEL: CPT

## 2025-04-19 PROCEDURE — 2580000003 HC RX 258: Performed by: PSYCHIATRY & NEUROLOGY

## 2025-04-19 PROCEDURE — 2060000000 HC ICU INTERMEDIATE R&B

## 2025-04-19 PROCEDURE — 2000000000 HC ICU R&B

## 2025-04-19 PROCEDURE — 97530 THERAPEUTIC ACTIVITIES: CPT

## 2025-04-19 PROCEDURE — 83036 HEMOGLOBIN GLYCOSYLATED A1C: CPT

## 2025-04-19 PROCEDURE — 70450 CT HEAD/BRAIN W/O DYE: CPT

## 2025-04-19 PROCEDURE — 74018 RADEX ABDOMEN 1 VIEW: CPT

## 2025-04-19 PROCEDURE — 93010 ELECTROCARDIOGRAM REPORT: CPT | Performed by: INTERNAL MEDICINE

## 2025-04-19 PROCEDURE — 81001 URINALYSIS AUTO W/SCOPE: CPT

## 2025-04-19 PROCEDURE — 6360000002 HC RX W HCPCS: Performed by: PSYCHIATRY & NEUROLOGY

## 2025-04-19 PROCEDURE — 99233 SBSQ HOSP IP/OBS HIGH 50: CPT | Performed by: PSYCHIATRY & NEUROLOGY

## 2025-04-19 PROCEDURE — 6370000000 HC RX 637 (ALT 250 FOR IP): Performed by: REGISTERED NURSE

## 2025-04-19 PROCEDURE — 36415 COLL VENOUS BLD VENIPUNCTURE: CPT

## 2025-04-19 PROCEDURE — 6370000000 HC RX 637 (ALT 250 FOR IP)

## 2025-04-19 PROCEDURE — 2500000003 HC RX 250 WO HCPCS: Performed by: ANESTHESIOLOGY

## 2025-04-19 PROCEDURE — 97110 THERAPEUTIC EXERCISES: CPT

## 2025-04-19 PROCEDURE — 84443 ASSAY THYROID STIM HORMONE: CPT

## 2025-04-19 PROCEDURE — 92526 ORAL FUNCTION THERAPY: CPT

## 2025-04-19 PROCEDURE — 80048 BASIC METABOLIC PNL TOTAL CA: CPT

## 2025-04-19 PROCEDURE — 85025 COMPLETE CBC W/AUTO DIFF WBC: CPT

## 2025-04-19 PROCEDURE — 99291 CRITICAL CARE FIRST HOUR: CPT | Performed by: PSYCHIATRY & NEUROLOGY

## 2025-04-19 PROCEDURE — 6370000000 HC RX 637 (ALT 250 FOR IP): Performed by: NURSE PRACTITIONER

## 2025-04-19 PROCEDURE — 97162 PT EVAL MOD COMPLEX 30 MIN: CPT

## 2025-04-19 PROCEDURE — 6360000002 HC RX W HCPCS: Performed by: NURSE PRACTITIONER

## 2025-04-19 PROCEDURE — 97535 SELF CARE MNGMENT TRAINING: CPT

## 2025-04-19 PROCEDURE — 97167 OT EVAL HIGH COMPLEX 60 MIN: CPT

## 2025-04-19 PROCEDURE — 92610 EVALUATE SWALLOWING FUNCTION: CPT

## 2025-04-19 RX ORDER — AMLODIPINE BESYLATE 10 MG/1
5 TABLET ORAL DAILY
Status: DISCONTINUED | OUTPATIENT
Start: 2025-04-19 | End: 2025-04-20

## 2025-04-19 RX ORDER — NOREPINEPHRINE BITARTRATE 0.06 MG/ML
INJECTION, SOLUTION INTRAVENOUS
Status: DISCONTINUED
Start: 2025-04-19 | End: 2025-04-19 | Stop reason: WASHOUT

## 2025-04-19 RX ORDER — MIRTAZAPINE 15 MG/1
15 TABLET, FILM COATED ORAL NIGHTLY
Status: DISCONTINUED | OUTPATIENT
Start: 2025-04-19 | End: 2025-04-21

## 2025-04-19 RX ORDER — ASPIRIN 81 MG/1
81 TABLET, CHEWABLE ORAL DAILY
Status: DISCONTINUED | OUTPATIENT
Start: 2025-04-19 | End: 2025-04-21

## 2025-04-19 RX ORDER — ENOXAPARIN SODIUM 100 MG/ML
40 INJECTION SUBCUTANEOUS DAILY
Status: DISCONTINUED | OUTPATIENT
Start: 2025-04-19 | End: 2025-04-23

## 2025-04-19 RX ADMIN — HYDRALAZINE HYDROCHLORIDE 10 MG: 20 INJECTION INTRAMUSCULAR; INTRAVENOUS at 15:24

## 2025-04-19 RX ADMIN — HYDRALAZINE HYDROCHLORIDE 10 MG: 20 INJECTION INTRAMUSCULAR; INTRAVENOUS at 19:15

## 2025-04-19 RX ADMIN — ENOXAPARIN SODIUM 40 MG: 100 INJECTION SUBCUTANEOUS at 08:45

## 2025-04-19 RX ADMIN — SODIUM CHLORIDE, PRESERVATIVE FREE 10 ML: 5 INJECTION INTRAVENOUS at 07:58

## 2025-04-19 RX ADMIN — SODIUM CHLORIDE: 0.9 INJECTION, SOLUTION INTRAVENOUS at 09:10

## 2025-04-19 RX ADMIN — HYDRALAZINE HYDROCHLORIDE 10 MG: 20 INJECTION INTRAMUSCULAR; INTRAVENOUS at 17:42

## 2025-04-19 RX ADMIN — Medication 10 MG: at 12:04

## 2025-04-19 RX ADMIN — NICARDIPINE HYDROCHLORIDE 2.5 MG/HR: 0.1 INJECTION INTRAVENOUS at 00:03

## 2025-04-19 RX ADMIN — AMLODIPINE BESYLATE 5 MG: 10 TABLET ORAL at 23:31

## 2025-04-19 RX ADMIN — HYDRALAZINE HYDROCHLORIDE 10 MG: 20 INJECTION INTRAMUSCULAR; INTRAVENOUS at 11:04

## 2025-04-19 RX ADMIN — Medication 10 MG: at 13:04

## 2025-04-19 RX ADMIN — MIRTAZAPINE 15 MG: 15 TABLET, FILM COATED ORAL at 21:18

## 2025-04-19 RX ADMIN — Medication 10 MG: at 21:25

## 2025-04-19 RX ADMIN — SODIUM CHLORIDE, PRESERVATIVE FREE 10 ML: 5 INJECTION INTRAVENOUS at 21:18

## 2025-04-19 RX ADMIN — HYDRALAZINE HYDROCHLORIDE 10 MG: 20 INJECTION INTRAMUSCULAR; INTRAVENOUS at 14:18

## 2025-04-19 RX ADMIN — ATORVASTATIN CALCIUM 80 MG: 80 TABLET, FILM COATED ORAL at 21:18

## 2025-04-19 NOTE — CARE COORDINATION
Case Management   Daily Progress Note       Patient Name: Carrington Mulligan                   YOB: 1955  Diagnosis: Acute ischemic left middle cerebral artery (MCA) stroke (HCC) [I63.512]  Cerebrovascular accident (CVA), unspecified mechanism (HCC) [I63.9]                         days  Length of Stay: 1  days    Anticipated Discharge Date: To be determined    Readmission Risk (Low < 19, Mod (19-27), High > 27): Readmission Risk Score: 18.4        Current Transitional Plan    [] Home Independently    [] Home with HC    [] Skilled Nursing Facility    [] Acute Rehabilitation    [] Long Term Acute Care (LTAC)    [] Other:     Plan for the Stay (Medical Management) :          Workflow Continuation (Additional Notes) :    1600 PC to listed legal guardian Ruben Gillig ([employer]), office is closed.  PC to listed caregiver Ana Lilia, unable to leave a message with her.          HANNAH GARCIA  April 19, 2025

## 2025-04-20 ENCOUNTER — APPOINTMENT (OUTPATIENT)
Dept: MRI IMAGING | Age: 70
DRG: 023 | End: 2025-04-20
Payer: MEDICARE

## 2025-04-20 ENCOUNTER — APPOINTMENT (OUTPATIENT)
Dept: CT IMAGING | Age: 70
DRG: 023 | End: 2025-04-20
Payer: MEDICARE

## 2025-04-20 LAB
ANION GAP SERPL CALCULATED.3IONS-SCNC: 12 MMOL/L (ref 9–16)
BASOPHILS # BLD: 0.06 K/UL (ref 0–0.2)
BASOPHILS NFR BLD: 1 % (ref 0–2)
BUN SERPL-MCNC: 12 MG/DL (ref 8–23)
CALCIUM SERPL-MCNC: 8.8 MG/DL (ref 8.6–10.4)
CHLORIDE SERPL-SCNC: 113 MMOL/L (ref 98–107)
CO2 SERPL-SCNC: 18 MMOL/L (ref 20–31)
CREAT SERPL-MCNC: 0.7 MG/DL (ref 0.7–1.2)
EOSINOPHIL # BLD: 0.13 K/UL (ref 0–0.44)
EOSINOPHILS RELATIVE PERCENT: 1 % (ref 1–4)
ERYTHROCYTE [DISTWIDTH] IN BLOOD BY AUTOMATED COUNT: 14.5 % (ref 11.8–14.4)
GFR, ESTIMATED: >90 ML/MIN/1.73M2
GLUCOSE SERPL-MCNC: 96 MG/DL (ref 74–99)
HCT VFR BLD AUTO: 34.4 % (ref 40.7–50.3)
HGB BLD-MCNC: 11.3 G/DL (ref 13–17)
IMM GRANULOCYTES # BLD AUTO: 0.04 K/UL (ref 0–0.3)
IMM GRANULOCYTES NFR BLD: 0 %
LYMPHOCYTES NFR BLD: 1.35 K/UL (ref 1.1–3.7)
LYMPHOCYTES RELATIVE PERCENT: 11 % (ref 24–43)
MAGNESIUM SERPL-MCNC: 2 MG/DL (ref 1.6–2.4)
MCH RBC QN AUTO: 29.2 PG (ref 25.2–33.5)
MCHC RBC AUTO-ENTMCNC: 32.8 G/DL (ref 28.4–34.8)
MCV RBC AUTO: 88.9 FL (ref 82.6–102.9)
MONOCYTES NFR BLD: 0.7 K/UL (ref 0.1–1.2)
MONOCYTES NFR BLD: 6 % (ref 3–12)
NEUTROPHILS NFR BLD: 81 % (ref 36–65)
NEUTS SEG NFR BLD: 10.33 K/UL (ref 1.5–8.1)
NRBC BLD-RTO: 0 PER 100 WBC
PLATELET # BLD AUTO: 196 K/UL (ref 138–453)
PMV BLD AUTO: 10.7 FL (ref 8.1–13.5)
POTASSIUM SERPL-SCNC: 3.3 MMOL/L (ref 3.7–5.3)
RBC # BLD AUTO: 3.87 M/UL (ref 4.21–5.77)
RBC # BLD: ABNORMAL 10*6/UL
SODIUM SERPL-SCNC: 143 MMOL/L (ref 136–145)
WBC OTHER # BLD: 12.6 K/UL (ref 3.5–11.3)

## 2025-04-20 PROCEDURE — 6370000000 HC RX 637 (ALT 250 FOR IP)

## 2025-04-20 PROCEDURE — 2580000003 HC RX 258: Performed by: PSYCHIATRY & NEUROLOGY

## 2025-04-20 PROCEDURE — 99232 SBSQ HOSP IP/OBS MODERATE 35: CPT | Performed by: PSYCHIATRY & NEUROLOGY

## 2025-04-20 PROCEDURE — 85025 COMPLETE CBC W/AUTO DIFF WBC: CPT

## 2025-04-20 PROCEDURE — 94761 N-INVAS EAR/PLS OXIMETRY MLT: CPT

## 2025-04-20 PROCEDURE — 6360000002 HC RX W HCPCS: Performed by: PSYCHIATRY & NEUROLOGY

## 2025-04-20 PROCEDURE — 70450 CT HEAD/BRAIN W/O DYE: CPT

## 2025-04-20 PROCEDURE — 6360000002 HC RX W HCPCS: Performed by: NURSE PRACTITIONER

## 2025-04-20 PROCEDURE — 80048 BASIC METABOLIC PNL TOTAL CA: CPT

## 2025-04-20 PROCEDURE — 36415 COLL VENOUS BLD VENIPUNCTURE: CPT

## 2025-04-20 PROCEDURE — 83735 ASSAY OF MAGNESIUM: CPT

## 2025-04-20 PROCEDURE — 99233 SBSQ HOSP IP/OBS HIGH 50: CPT | Performed by: PSYCHIATRY & NEUROLOGY

## 2025-04-20 PROCEDURE — 2500000003 HC RX 250 WO HCPCS: Performed by: NURSE PRACTITIONER

## 2025-04-20 PROCEDURE — 2500000003 HC RX 250 WO HCPCS: Performed by: ANESTHESIOLOGY

## 2025-04-20 PROCEDURE — 6360000002 HC RX W HCPCS

## 2025-04-20 PROCEDURE — 2060000000 HC ICU INTERMEDIATE R&B

## 2025-04-20 PROCEDURE — 70551 MRI BRAIN STEM W/O DYE: CPT

## 2025-04-20 PROCEDURE — 6370000000 HC RX 637 (ALT 250 FOR IP): Performed by: PSYCHIATRY & NEUROLOGY

## 2025-04-20 PROCEDURE — 6370000000 HC RX 637 (ALT 250 FOR IP): Performed by: NURSE PRACTITIONER

## 2025-04-20 PROCEDURE — 2500000003 HC RX 250 WO HCPCS: Performed by: STUDENT IN AN ORGANIZED HEALTH CARE EDUCATION/TRAINING PROGRAM

## 2025-04-20 PROCEDURE — 6370000000 HC RX 637 (ALT 250 FOR IP): Performed by: REGISTERED NURSE

## 2025-04-20 RX ORDER — AMLODIPINE BESYLATE 10 MG/1
10 TABLET ORAL DAILY
Status: DISCONTINUED | OUTPATIENT
Start: 2025-04-20 | End: 2025-04-24

## 2025-04-20 RX ORDER — LISINOPRIL 20 MG/1
10 TABLET ORAL DAILY
Status: DISCONTINUED | OUTPATIENT
Start: 2025-04-20 | End: 2025-04-21

## 2025-04-20 RX ORDER — HYDRALAZINE HYDROCHLORIDE 20 MG/ML
5 INJECTION INTRAMUSCULAR; INTRAVENOUS ONCE
Status: COMPLETED | OUTPATIENT
Start: 2025-04-20 | End: 2025-04-20

## 2025-04-20 RX ADMIN — SODIUM CHLORIDE, PRESERVATIVE FREE 5 ML: 5 INJECTION INTRAVENOUS at 21:23

## 2025-04-20 RX ADMIN — POTASSIUM BICARBONATE 40 MEQ: 782 TABLET, EFFERVESCENT ORAL at 06:31

## 2025-04-20 RX ADMIN — ASPIRIN 81 MG 81 MG: 81 TABLET ORAL at 08:58

## 2025-04-20 RX ADMIN — Medication 10 MG: at 11:04

## 2025-04-20 RX ADMIN — HYDRALAZINE HYDROCHLORIDE 10 MG: 20 INJECTION INTRAMUSCULAR; INTRAVENOUS at 09:24

## 2025-04-20 RX ADMIN — Medication 10 MG: at 13:12

## 2025-04-20 RX ADMIN — SODIUM CHLORIDE: 0.9 INJECTION, SOLUTION INTRAVENOUS at 13:19

## 2025-04-20 RX ADMIN — ATORVASTATIN CALCIUM 80 MG: 80 TABLET, FILM COATED ORAL at 21:23

## 2025-04-20 RX ADMIN — HYDRALAZINE HYDROCHLORIDE 10 MG: 20 INJECTION INTRAMUSCULAR; INTRAVENOUS at 00:40

## 2025-04-20 RX ADMIN — Medication 10 MG: at 17:05

## 2025-04-20 RX ADMIN — ENOXAPARIN SODIUM 40 MG: 100 INJECTION SUBCUTANEOUS at 08:57

## 2025-04-20 RX ADMIN — MIRTAZAPINE 15 MG: 15 TABLET, FILM COATED ORAL at 21:23

## 2025-04-20 RX ADMIN — HYDRALAZINE HYDROCHLORIDE 10 MG: 20 INJECTION INTRAMUSCULAR; INTRAVENOUS at 21:30

## 2025-04-20 RX ADMIN — AMLODIPINE BESYLATE 10 MG: 10 TABLET ORAL at 08:57

## 2025-04-20 RX ADMIN — SODIUM CHLORIDE, PRESERVATIVE FREE 5 ML: 5 INJECTION INTRAVENOUS at 08:58

## 2025-04-20 RX ADMIN — HYDRALAZINE HYDROCHLORIDE 10 MG: 20 INJECTION INTRAMUSCULAR; INTRAVENOUS at 06:33

## 2025-04-20 RX ADMIN — LISINOPRIL 10 MG: 20 TABLET ORAL at 13:12

## 2025-04-20 RX ADMIN — SERTRALINE 100 MG: 50 TABLET, FILM COATED ORAL at 08:58

## 2025-04-20 RX ADMIN — HYDRALAZINE HYDROCHLORIDE 5 MG: 20 INJECTION INTRAMUSCULAR; INTRAVENOUS at 13:38

## 2025-04-20 NOTE — H&P
status because of co-morbidities listed above, severity of signs and symptoms as outlined, requirement for current medical therapies and most importantly because of direct risk to patient if care not provided in a hospital setting.    Patria Sethi MD  Neurology Resident PGY-3  4/20/2025  12:19 PM    Copy sent to Dr. Santacruz, John Contreras MD    
ulcer: N/A    DVT PROPHYLAXIS:  - SCD sleeves - Thigh High   - No chemoprophylaxis anticoagulation at this time, consider starting 4/19 pending repeat imaging.      DISPOSITION: Admit to the Neuro ICU for close neurological monitoring.    At least 30min of critical care time spent at patient bedside examining patient,reviewing the images personally, speaking with the nursing staff regarding recommendations.        URSZULA Ospina - CNP  Neuro Critical Care Service   4/18/2025     1:53 PM

## 2025-04-20 NOTE — CARE COORDINATION
Case Management Assessment  Initial Evaluation    Date/Time of Evaluation: 4/20/2025 7:09 AM  Assessment Completed by: Sheryl Henry RN    If patient is discharged prior to next notation, then this note serves as note for discharge by case management.    Patient Name: Carrington Mulligan                   YOB: 1955  Diagnosis: Acute ischemic left middle cerebral artery (MCA) stroke (HCC) [I63.512]  Cerebrovascular accident (CVA), unspecified mechanism (HCC) [I63.9]                   Date / Time: 4/18/2025  1:12 PM    Patient Admission Status: Inpatient   Readmission Risk (Low < 19, Mod (19-27), High > 27): Readmission Risk Score: 18.7    Current PCP: John Santacruz MD  PCP verified by CM? Yes    Chart Reviewed: Yes      History Provided by: Other (see comment) (medical record and care provider)  Patient Orientation: Unable to Assess    Patient Cognition: Other (see comment) (non verbal)    Hospitalization in the last 30 days (Readmission):  Yes    If yes, Readmission Assessment in CM Navigator will be completed.    Advance Directives:      Code Status: Full Code   Patient's Primary Decision Maker is: Named in Scanned ACP Document    Primary Decision Maker: Apsi,Brad Gillig - Legal Guardian, Legal Guardian - [phone]    Discharge Planning:    Patient lives with: Other (Comment) (group home) Type of Home: Group Home  Primary Care Giver: Self  Patient Support Systems include: Other (Comment) (group home staff and residents, family (limited))   Current Financial resources:    Current community resources: None  Current services prior to admission: None            Current DME:              Type of Home Care services:       ADLS  Prior functional level: Independent in ADLs/IADLs  Current functional level: Independent in ADLs/IADLs    PT AM-PAC:   /24  OT AM-PAC: 14 /24    Family can provide assistance at DC: No  Would you like Case Management to discuss the discharge plan with any other family

## 2025-04-21 ENCOUNTER — APPOINTMENT (OUTPATIENT)
Dept: GENERAL RADIOLOGY | Age: 70
DRG: 023 | End: 2025-04-21
Payer: MEDICARE

## 2025-04-21 ENCOUNTER — APPOINTMENT (OUTPATIENT)
Age: 70
DRG: 023 | End: 2025-04-21
Payer: MEDICARE

## 2025-04-21 LAB
ANION GAP SERPL CALCULATED.3IONS-SCNC: 8 MMOL/L (ref 9–16)
BASOPHILS # BLD: 0.07 K/UL (ref 0–0.2)
BASOPHILS NFR BLD: 1 % (ref 0–2)
BUN SERPL-MCNC: 10 MG/DL (ref 8–23)
CALCIUM SERPL-MCNC: 9 MG/DL (ref 8.6–10.4)
CHLORIDE SERPL-SCNC: 108 MMOL/L (ref 98–107)
CO2 SERPL-SCNC: 23 MMOL/L (ref 20–31)
CREAT SERPL-MCNC: 0.6 MG/DL (ref 0.7–1.2)
ECHO AO ROOT DIAM: 3.2 CM
ECHO AO ROOT INDEX: 2.01 CM/M2
ECHO AR MAX VEL PISA: 2 M/S
ECHO AV AREA PEAK VELOCITY: 3.7 CM2
ECHO AV AREA VTI: 3.5 CM2
ECHO AV AREA/BSA PEAK VELOCITY: 2.3 CM2/M2
ECHO AV AREA/BSA VTI: 2.2 CM2/M2
ECHO AV MEAN GRADIENT: 2 MMHG
ECHO AV MEAN VELOCITY: 0.7 M/S
ECHO AV PEAK GRADIENT: 4 MMHG
ECHO AV PEAK VELOCITY: 1.1 M/S
ECHO AV REGURGITANT PHT: 805 MS
ECHO AV VELOCITY RATIO: 0.82
ECHO AV VTI: 22.4 CM
ECHO BSA: 1.6 M2
ECHO LA AREA 4C: 7.8 CM2
ECHO LA DIAMETER INDEX: 2.01 CM/M2
ECHO LA DIAMETER: 3.2 CM
ECHO LA MAJOR AXIS: 3.2 CM
ECHO LA TO AORTIC ROOT RATIO: 1
ECHO LA VOL MOD A4C: 16 ML (ref 18–58)
ECHO LA VOLUME INDEX MOD A4C: 10 ML/M2 (ref 16–34)
ECHO LV E' LATERAL VELOCITY: 11.9 CM/S
ECHO LV E' SEPTAL VELOCITY: 7.62 CM/S
ECHO LV EDV A2C: 79 ML
ECHO LV EDV A4C: 61 ML
ECHO LV EDV INDEX A4C: 38 ML/M2
ECHO LV EDV NDEX A2C: 50 ML/M2
ECHO LV EF PHYSICIAN: 50 %
ECHO LV EJECTION FRACTION A2C: 58 %
ECHO LV EJECTION FRACTION A4C: 50 %
ECHO LV EJECTION FRACTION BIPLANE: 50 % (ref 55–100)
ECHO LV ESV A2C: 33 ML
ECHO LV ESV A4C: 31 ML
ECHO LV ESV INDEX A2C: 21 ML/M2
ECHO LV ESV INDEX A4C: 19 ML/M2
ECHO LV FRACTIONAL SHORTENING: 22 % (ref 28–44)
ECHO LV INTERNAL DIMENSION DIASTOLE INDEX: 3.14 CM/M2
ECHO LV INTERNAL DIMENSION DIASTOLIC: 5 CM (ref 4.2–5.9)
ECHO LV INTERNAL DIMENSION SYSTOLIC INDEX: 2.45 CM/M2
ECHO LV INTERNAL DIMENSION SYSTOLIC: 3.9 CM
ECHO LV IVSD: 0.9 CM (ref 0.6–1)
ECHO LV MASS 2D: 146.8 G (ref 88–224)
ECHO LV MASS INDEX 2D: 92.3 G/M2 (ref 49–115)
ECHO LV POSTERIOR WALL DIASTOLIC: 0.8 CM (ref 0.6–1)
ECHO LV RELATIVE WALL THICKNESS RATIO: 0.32
ECHO LVOT AREA: 4.5 CM2
ECHO LVOT AV VTI INDEX: 0.76
ECHO LVOT DIAM: 2.4 CM
ECHO LVOT MEAN GRADIENT: 1 MMHG
ECHO LVOT PEAK GRADIENT: 3 MMHG
ECHO LVOT PEAK VELOCITY: 0.9 M/S
ECHO LVOT STROKE VOLUME INDEX: 48.6 ML/M2
ECHO LVOT SV: 77.3 ML
ECHO LVOT VTI: 17.1 CM
ECHO MV A VELOCITY: 0.72 M/S
ECHO MV AREA VTI: 2.6 CM2
ECHO MV E DECELERATION TIME (DT): 284 MS
ECHO MV E VELOCITY: 0.62 M/S
ECHO MV E/A RATIO: 0.86
ECHO MV E/E' LATERAL: 5.21
ECHO MV E/E' RATIO (AVERAGED): 6.67
ECHO MV E/E' SEPTAL: 8.14
ECHO MV LVOT VTI INDEX: 1.76
ECHO MV MAX VELOCITY: 0.9 M/S
ECHO MV MEAN GRADIENT: 2 MMHG
ECHO MV MEAN VELOCITY: 0.6 M/S
ECHO MV PEAK GRADIENT: 3 MMHG
ECHO MV VTI: 30.1 CM
ECHO RV FREE WALL PEAK S': 11.6 CM/S
ECHO RV TAPSE: 2.1 CM (ref 1.7–?)
ECHO TV REGURGITANT MAX VELOCITY: 2.52 M/S
ECHO TV REGURGITANT PEAK GRADIENT: 25 MMHG
EOSINOPHIL # BLD: 0.13 K/UL (ref 0–0.44)
EOSINOPHILS RELATIVE PERCENT: 1 % (ref 1–4)
ERYTHROCYTE [DISTWIDTH] IN BLOOD BY AUTOMATED COUNT: 14.6 % (ref 11.8–14.4)
GFR, ESTIMATED: >90 ML/MIN/1.73M2
GLUCOSE SERPL-MCNC: 117 MG/DL (ref 74–99)
HCT VFR BLD AUTO: 35.4 % (ref 40.7–50.3)
HGB BLD-MCNC: 11.9 G/DL (ref 13–17)
IMM GRANULOCYTES # BLD AUTO: 0.04 K/UL (ref 0–0.3)
IMM GRANULOCYTES NFR BLD: 0 %
LYMPHOCYTES NFR BLD: 1.15 K/UL (ref 1.1–3.7)
LYMPHOCYTES RELATIVE PERCENT: 10 % (ref 24–43)
MCH RBC QN AUTO: 29.3 PG (ref 25.2–33.5)
MCHC RBC AUTO-ENTMCNC: 33.6 G/DL (ref 28.4–34.8)
MCV RBC AUTO: 87.2 FL (ref 82.6–102.9)
MONOCYTES NFR BLD: 0.94 K/UL (ref 0.1–1.2)
MONOCYTES NFR BLD: 8 % (ref 3–12)
NEUTROPHILS NFR BLD: 80 % (ref 36–65)
NEUTS SEG NFR BLD: 9.46 K/UL (ref 1.5–8.1)
NRBC BLD-RTO: 0 PER 100 WBC
PLATELET # BLD AUTO: 205 K/UL (ref 138–453)
PMV BLD AUTO: 10.8 FL (ref 8.1–13.5)
POTASSIUM SERPL-SCNC: 3.6 MMOL/L (ref 3.7–5.3)
RBC # BLD AUTO: 4.06 M/UL (ref 4.21–5.77)
RBC # BLD: ABNORMAL 10*6/UL
SODIUM SERPL-SCNC: 139 MMOL/L (ref 136–145)
WBC OTHER # BLD: 11.8 K/UL (ref 3.5–11.3)

## 2025-04-21 PROCEDURE — 2500000003 HC RX 250 WO HCPCS: Performed by: NURSE PRACTITIONER

## 2025-04-21 PROCEDURE — 93306 TTE W/DOPPLER COMPLETE: CPT | Performed by: INTERNAL MEDICINE

## 2025-04-21 PROCEDURE — 2060000000 HC ICU INTERMEDIATE R&B

## 2025-04-21 PROCEDURE — 6370000000 HC RX 637 (ALT 250 FOR IP): Performed by: NURSE PRACTITIONER

## 2025-04-21 PROCEDURE — 97535 SELF CARE MNGMENT TRAINING: CPT

## 2025-04-21 PROCEDURE — 6370000000 HC RX 637 (ALT 250 FOR IP)

## 2025-04-21 PROCEDURE — 6360000002 HC RX W HCPCS: Performed by: PSYCHIATRY & NEUROLOGY

## 2025-04-21 PROCEDURE — 71045 X-RAY EXAM CHEST 1 VIEW: CPT

## 2025-04-21 PROCEDURE — 6360000002 HC RX W HCPCS

## 2025-04-21 PROCEDURE — 6370000000 HC RX 637 (ALT 250 FOR IP): Performed by: PSYCHIATRY & NEUROLOGY

## 2025-04-21 PROCEDURE — 97530 THERAPEUTIC ACTIVITIES: CPT

## 2025-04-21 PROCEDURE — 99233 SBSQ HOSP IP/OBS HIGH 50: CPT | Performed by: PSYCHIATRY & NEUROLOGY

## 2025-04-21 PROCEDURE — 85025 COMPLETE CBC W/AUTO DIFF WBC: CPT

## 2025-04-21 PROCEDURE — 97140 MANUAL THERAPY 1/> REGIONS: CPT

## 2025-04-21 PROCEDURE — 93306 TTE W/DOPPLER COMPLETE: CPT

## 2025-04-21 PROCEDURE — 94761 N-INVAS EAR/PLS OXIMETRY MLT: CPT

## 2025-04-21 PROCEDURE — 97116 GAIT TRAINING THERAPY: CPT

## 2025-04-21 PROCEDURE — 92526 ORAL FUNCTION THERAPY: CPT

## 2025-04-21 PROCEDURE — 2500000003 HC RX 250 WO HCPCS: Performed by: STUDENT IN AN ORGANIZED HEALTH CARE EDUCATION/TRAINING PROGRAM

## 2025-04-21 PROCEDURE — 99232 SBSQ HOSP IP/OBS MODERATE 35: CPT | Performed by: PSYCHIATRY & NEUROLOGY

## 2025-04-21 PROCEDURE — 80048 BASIC METABOLIC PNL TOTAL CA: CPT

## 2025-04-21 PROCEDURE — 36415 COLL VENOUS BLD VENIPUNCTURE: CPT

## 2025-04-21 PROCEDURE — 6360000002 HC RX W HCPCS: Performed by: NURSE PRACTITIONER

## 2025-04-21 RX ORDER — ATORVASTATIN CALCIUM 40 MG/1
80 TABLET, FILM COATED ORAL NIGHTLY
Status: DISCONTINUED | OUTPATIENT
Start: 2025-04-21 | End: 2025-04-24

## 2025-04-21 RX ORDER — POLYETHYLENE GLYCOL 3350 17 G/17G
17 POWDER, FOR SOLUTION ORAL DAILY PRN
Status: DISCONTINUED | OUTPATIENT
Start: 2025-04-21 | End: 2025-04-30 | Stop reason: HOSPADM

## 2025-04-21 RX ORDER — LISINOPRIL 10 MG/1
10 TABLET ORAL DAILY
Status: DISCONTINUED | OUTPATIENT
Start: 2025-04-22 | End: 2025-04-22

## 2025-04-21 RX ORDER — ASPIRIN 81 MG/1
81 TABLET, CHEWABLE ORAL DAILY
Status: DISCONTINUED | OUTPATIENT
Start: 2025-04-22 | End: 2025-04-24

## 2025-04-21 RX ORDER — POTASSIUM CHLORIDE 7.45 MG/ML
10 INJECTION INTRAVENOUS
Status: COMPLETED | OUTPATIENT
Start: 2025-04-21 | End: 2025-04-21

## 2025-04-21 RX ORDER — MIRTAZAPINE 15 MG/1
15 TABLET, FILM COATED ORAL NIGHTLY
Status: DISCONTINUED | OUTPATIENT
Start: 2025-04-21 | End: 2025-04-30 | Stop reason: HOSPADM

## 2025-04-21 RX ADMIN — POTASSIUM CHLORIDE 10 MEQ: 7.45 INJECTION INTRAVENOUS at 07:55

## 2025-04-21 RX ADMIN — LISINOPRIL 10 MG: 20 TABLET ORAL at 07:51

## 2025-04-21 RX ADMIN — ENOXAPARIN SODIUM 40 MG: 100 INJECTION SUBCUTANEOUS at 07:52

## 2025-04-21 RX ADMIN — AMLODIPINE BESYLATE 10 MG: 10 TABLET ORAL at 07:51

## 2025-04-21 RX ADMIN — SODIUM CHLORIDE, PRESERVATIVE FREE 5 ML: 5 INJECTION INTRAVENOUS at 07:52

## 2025-04-21 RX ADMIN — POTASSIUM CHLORIDE 10 MEQ: 7.45 INJECTION INTRAVENOUS at 09:00

## 2025-04-21 RX ADMIN — ASPIRIN 81 MG 81 MG: 81 TABLET ORAL at 07:51

## 2025-04-21 RX ADMIN — MIRTAZAPINE 15 MG: 15 TABLET, FILM COATED ORAL at 20:33

## 2025-04-21 RX ADMIN — HYDRALAZINE HYDROCHLORIDE 10 MG: 20 INJECTION INTRAMUSCULAR; INTRAVENOUS at 17:01

## 2025-04-21 RX ADMIN — Medication 10 MG: at 07:52

## 2025-04-21 RX ADMIN — SODIUM CHLORIDE, PRESERVATIVE FREE 10 ML: 5 INJECTION INTRAVENOUS at 20:34

## 2025-04-21 RX ADMIN — ATORVASTATIN CALCIUM 80 MG: 40 TABLET, FILM COATED ORAL at 20:33

## 2025-04-21 RX ADMIN — SERTRALINE 100 MG: 50 TABLET, FILM COATED ORAL at 07:51

## 2025-04-21 RX ADMIN — Medication 10 MG: at 16:23

## 2025-04-21 NOTE — CARE COORDINATION
Case Management   Daily Progress Note       Patient Name: Carrignton Mulligan                   YOB: 1955  Diagnosis: Acute ischemic left middle cerebral artery (MCA) stroke (HCC) [I63.512]  Cerebrovascular accident (CVA), unspecified mechanism (HCC) [I63.9]                         days  Length of Stay: 3  days    Anticipated Discharge Date: Two or more days before discharge    Readmission Risk (Low < 19, Mod (19-27), High > 27): Readmission Risk Score: 19.2        Current Transitional Plan    [] Home Independently    [] Home with HC    [x] Skilled Nursing Facility    [x] Acute Rehabilitation    [] Long Term Acute Care (LTAC)    [] Other:     Plan for the Stay (Medical Management) :  Stroke work up continues  NG needs swallow  Angiogram        Workflow Continuation (Additional Notes) :  Spoke to Ruben [place] Guardian, informed him of the possible needs for the patient.  We discussed what is available to him at the group home and what he may need.  I informed him that we are awaiting Therapy notes for a better idea of his abilities.  He states to call him once that therapy has evaluated him and we can discuss his needs.          Sheryl Henry RN  April 21, 2025

## 2025-04-21 NOTE — ANESTHESIA POSTPROCEDURE EVALUATION
Department of Anesthesiology  Postprocedure Note    Patient: Carrington Mulligan  MRN: [id number]  YOB: 1955  Date of evaluation: 4/21/2025    Procedure Summary       Date: 04/18/25 Room / Location: [facility]    Anesthesia Start: 1455 Anesthesia Stop: 1704    Procedure: IR ANGIOGRAM CAROTID CEREBRAL BILATERAL Diagnosis: (M1 thrombectomy; dr bañuelos)    Scheduled Providers: Mnedy Melton APRN - CRNA Responsible Provider: Kenn Breen MD    Anesthesia Type: general ASA Status: 3 - Emergent            Anesthesia Type: No value filed.    Mele Phase I: Mele Score: 8    Mele Phase II:      Anesthesia Post Evaluation    Patient location during evaluation: PACU  Patient participation: complete - patient participated  Level of consciousness: awake and alert  Airway patency: patent  Nausea & Vomiting: no nausea and no vomiting  Cardiovascular status: blood pressure returned to baseline  Respiratory status: acceptable  Hydration status: euvolemic  Pain management: adequate    No notable events documented.

## 2025-04-22 ENCOUNTER — APPOINTMENT (OUTPATIENT)
Dept: GENERAL RADIOLOGY | Age: 70
DRG: 023 | End: 2025-04-22
Payer: MEDICARE

## 2025-04-22 ENCOUNTER — APPOINTMENT (OUTPATIENT)
Dept: VASCULAR LAB | Age: 70
DRG: 023 | End: 2025-04-22
Payer: MEDICARE

## 2025-04-22 ENCOUNTER — APPOINTMENT (OUTPATIENT)
Dept: CT IMAGING | Age: 70
DRG: 023 | End: 2025-04-22
Payer: MEDICARE

## 2025-04-22 LAB
ANION GAP SERPL CALCULATED.3IONS-SCNC: 9 MMOL/L (ref 9–16)
BASOPHILS # BLD: 0.04 K/UL (ref 0–0.2)
BASOPHILS NFR BLD: 0 % (ref 0–2)
BUN SERPL-MCNC: 11 MG/DL (ref 8–23)
CALCIUM SERPL-MCNC: 9.1 MG/DL (ref 8.6–10.4)
CHLORIDE SERPL-SCNC: 108 MMOL/L (ref 98–107)
CO2 SERPL-SCNC: 23 MMOL/L (ref 20–31)
CREAT SERPL-MCNC: 0.7 MG/DL (ref 0.7–1.2)
ECHO BSA: 1.6 M2
ECHO BSA: 1.6 M2
EOSINOPHIL # BLD: 0.12 K/UL (ref 0–0.44)
EOSINOPHILS RELATIVE PERCENT: 1 % (ref 1–4)
ERYTHROCYTE [DISTWIDTH] IN BLOOD BY AUTOMATED COUNT: 14.6 % (ref 11.8–14.4)
GFR, ESTIMATED: >90 ML/MIN/1.73M2
GLUCOSE SERPL-MCNC: 129 MG/DL (ref 74–99)
HCT VFR BLD AUTO: 38.6 % (ref 40.7–50.3)
HGB BLD-MCNC: 12.7 G/DL (ref 13–17)
IMM GRANULOCYTES # BLD AUTO: 0.04 K/UL (ref 0–0.3)
IMM GRANULOCYTES NFR BLD: 0 %
LYMPHOCYTES NFR BLD: 1.04 K/UL (ref 1.1–3.7)
LYMPHOCYTES RELATIVE PERCENT: 10 % (ref 24–43)
MCH RBC QN AUTO: 29.2 PG (ref 25.2–33.5)
MCHC RBC AUTO-ENTMCNC: 32.9 G/DL (ref 28.4–34.8)
MCV RBC AUTO: 88.7 FL (ref 82.6–102.9)
MONOCYTES NFR BLD: 1.01 K/UL (ref 0.1–1.2)
MONOCYTES NFR BLD: 10 % (ref 3–12)
NEUTROPHILS NFR BLD: 79 % (ref 36–65)
NEUTS SEG NFR BLD: 7.8 K/UL (ref 1.5–8.1)
NRBC BLD-RTO: 0 PER 100 WBC
PLATELET # BLD AUTO: 234 K/UL (ref 138–453)
PMV BLD AUTO: 11.7 FL (ref 8.1–13.5)
POTASSIUM SERPL-SCNC: 3.7 MMOL/L (ref 3.7–5.3)
RBC # BLD AUTO: 4.35 M/UL (ref 4.21–5.77)
RBC # BLD: ABNORMAL 10*6/UL
SODIUM SERPL-SCNC: 140 MMOL/L (ref 136–145)
WBC OTHER # BLD: 10.1 K/UL (ref 3.5–11.3)

## 2025-04-22 PROCEDURE — 70450 CT HEAD/BRAIN W/O DYE: CPT

## 2025-04-22 PROCEDURE — 92611 MOTION FLUOROSCOPY/SWALLOW: CPT

## 2025-04-22 PROCEDURE — 85025 COMPLETE CBC W/AUTO DIFF WBC: CPT

## 2025-04-22 PROCEDURE — 6370000000 HC RX 637 (ALT 250 FOR IP): Performed by: NURSE PRACTITIONER

## 2025-04-22 PROCEDURE — 74230 X-RAY XM SWLNG FUNCJ C+: CPT

## 2025-04-22 PROCEDURE — 6360000002 HC RX W HCPCS: Performed by: PSYCHIATRY & NEUROLOGY

## 2025-04-22 PROCEDURE — 6370000000 HC RX 637 (ALT 250 FOR IP)

## 2025-04-22 PROCEDURE — 99232 SBSQ HOSP IP/OBS MODERATE 35: CPT | Performed by: PSYCHIATRY & NEUROLOGY

## 2025-04-22 PROCEDURE — 93970 EXTREMITY STUDY: CPT

## 2025-04-22 PROCEDURE — 2060000000 HC ICU INTERMEDIATE R&B

## 2025-04-22 PROCEDURE — 80048 BASIC METABOLIC PNL TOTAL CA: CPT

## 2025-04-22 PROCEDURE — 99233 SBSQ HOSP IP/OBS HIGH 50: CPT | Performed by: PSYCHIATRY & NEUROLOGY

## 2025-04-22 PROCEDURE — 6370000000 HC RX 637 (ALT 250 FOR IP): Performed by: PSYCHIATRY & NEUROLOGY

## 2025-04-22 PROCEDURE — 93970 EXTREMITY STUDY: CPT | Performed by: SURGERY

## 2025-04-22 PROCEDURE — 99212 OFFICE O/P EST SF 10 MIN: CPT

## 2025-04-22 PROCEDURE — 36415 COLL VENOUS BLD VENIPUNCTURE: CPT

## 2025-04-22 PROCEDURE — 2500000003 HC RX 250 WO HCPCS: Performed by: ANESTHESIOLOGY

## 2025-04-22 PROCEDURE — 6360000002 HC RX W HCPCS: Performed by: NURSE PRACTITIONER

## 2025-04-22 RX ORDER — LISINOPRIL 20 MG/1
20 TABLET ORAL DAILY
Status: DISCONTINUED | OUTPATIENT
Start: 2025-04-23 | End: 2025-04-23

## 2025-04-22 RX ADMIN — Medication 10 MG: at 04:18

## 2025-04-22 RX ADMIN — SODIUM CHLORIDE, PRESERVATIVE FREE 10 ML: 5 INJECTION INTRAVENOUS at 20:06

## 2025-04-22 RX ADMIN — ASPIRIN 81 MG: 81 TABLET, CHEWABLE ORAL at 08:00

## 2025-04-22 RX ADMIN — MIRTAZAPINE 15 MG: 15 TABLET, FILM COATED ORAL at 20:06

## 2025-04-22 RX ADMIN — ENOXAPARIN SODIUM 40 MG: 100 INJECTION SUBCUTANEOUS at 08:00

## 2025-04-22 RX ADMIN — AMLODIPINE BESYLATE 10 MG: 10 TABLET ORAL at 08:00

## 2025-04-22 RX ADMIN — SERTRALINE HYDROCHLORIDE 100 MG: 50 TABLET ORAL at 07:59

## 2025-04-22 RX ADMIN — LISINOPRIL 10 MG: 10 TABLET ORAL at 07:59

## 2025-04-22 RX ADMIN — ATORVASTATIN CALCIUM 80 MG: 40 TABLET, FILM COATED ORAL at 20:06

## 2025-04-22 RX ADMIN — SODIUM CHLORIDE, PRESERVATIVE FREE 10 ML: 5 INJECTION INTRAVENOUS at 07:58

## 2025-04-22 NOTE — PROCEDURES
INSTRUMENTAL SWALLOW REPORT  MODIFIED BARIUM SWALLOW    NAME: Carrington Mulligan   : 1955  MRN: [id number]       Date of Eval: 2025        Radiologist: Dr. Avila    Past Medical History:  has a past medical history of Allergic rhinitis, Autism, Epilepsy, GERD (gastroesophageal reflux disease), Hypertriglyceridemia, Mental retardation, Occupational circumstances, and Seizures ([facility]).  Past Surgical History:  has a past surgical history that includes Colonoscopy (); other surgical history (Right, 2021); Finger nail surgery (Right, 2021); and thrombectomy (2025).    Type of Study: Initial MBS    Patient Complaints/Reason for Referral:  Carrington Mulligan was referred for a MBS to assess the efficiency of his/her swallow function, assess for aspiration, and to make recommendations regarding safe dietary consistencies, effective compensatory strategies, and safe eating environment.    Behavior/Cognition/Vision/Hearing:  Behavior/Cognition: Alert;Cooperative  Vision: Within Functional Limits  Hearing: Within functional limits    Impressions:  Pt. With no initial penetration/aspiration, + penetration after swallow of max residual in the vallecula and pyriform.  + spillover with + penetration, + aspiration with cough with nectar thick liquid.  Mod-max vallecula and pyriform residual noted.  + penetration, + aspiration, + cough with honey thick liquid.  Moderate residual noted.  + oral holding/swishing, decreased A-P transit, premature spill and oral residual noted.  Pt. Not appropriate for PO at this time.  ST to recommend NPO and repeat MBSS in 5-7 days.  Results and recommendations reported to RN.       Consistencies Administered: Pureed;Moderately Thickteaspoon;Mildly Thick teaspoon    Dysphagia Outcome Severity Scale: Level 1: Severe dysphagia- NPO. Unable to tolerate any PO safely  Penetration-Aspiration Scale (PAS): 7 - Material enters the airway, passes below the vocal folds, and is not

## 2025-04-23 LAB
ANION GAP SERPL CALCULATED.3IONS-SCNC: 8 MMOL/L (ref 9–16)
BASOPHILS # BLD: 0.06 K/UL (ref 0–0.2)
BASOPHILS NFR BLD: 1 % (ref 0–2)
BUN SERPL-MCNC: 18 MG/DL (ref 8–23)
CALCIUM SERPL-MCNC: 9 MG/DL (ref 8.6–10.4)
CHLORIDE SERPL-SCNC: 108 MMOL/L (ref 98–107)
CO2 SERPL-SCNC: 22 MMOL/L (ref 20–31)
CREAT SERPL-MCNC: 0.7 MG/DL (ref 0.7–1.2)
EOSINOPHIL # BLD: 0.17 K/UL (ref 0–0.44)
EOSINOPHILS RELATIVE PERCENT: 2 % (ref 1–4)
ERYTHROCYTE [DISTWIDTH] IN BLOOD BY AUTOMATED COUNT: 14.3 % (ref 11.8–14.4)
GFR, ESTIMATED: >90 ML/MIN/1.73M2
GLUCOSE SERPL-MCNC: 103 MG/DL (ref 74–99)
HCT VFR BLD AUTO: 37.4 % (ref 40.7–50.3)
HGB BLD-MCNC: 12.4 G/DL (ref 13–17)
IMM GRANULOCYTES # BLD AUTO: 0.03 K/UL (ref 0–0.3)
IMM GRANULOCYTES NFR BLD: 0 %
LYMPHOCYTES NFR BLD: 1.31 K/UL (ref 1.1–3.7)
LYMPHOCYTES RELATIVE PERCENT: 18 % (ref 24–43)
MCH RBC QN AUTO: 29.5 PG (ref 25.2–33.5)
MCHC RBC AUTO-ENTMCNC: 33.2 G/DL (ref 28.4–34.8)
MCV RBC AUTO: 88.8 FL (ref 82.6–102.9)
MONOCYTES NFR BLD: 0.85 K/UL (ref 0.1–1.2)
MONOCYTES NFR BLD: 12 % (ref 3–12)
NEUTROPHILS NFR BLD: 67 % (ref 36–65)
NEUTS SEG NFR BLD: 4.75 K/UL (ref 1.5–8.1)
NRBC BLD-RTO: 0 PER 100 WBC
PLATELET # BLD AUTO: 194 K/UL (ref 138–453)
PMV BLD AUTO: 11 FL (ref 8.1–13.5)
POTASSIUM SERPL-SCNC: 4 MMOL/L (ref 3.7–5.3)
RBC # BLD AUTO: 4.21 M/UL (ref 4.21–5.77)
SODIUM SERPL-SCNC: 138 MMOL/L (ref 136–145)
WBC OTHER # BLD: 7.2 K/UL (ref 3.5–11.3)

## 2025-04-23 PROCEDURE — 6370000000 HC RX 637 (ALT 250 FOR IP)

## 2025-04-23 PROCEDURE — 6370000000 HC RX 637 (ALT 250 FOR IP): Performed by: NURSE PRACTITIONER

## 2025-04-23 PROCEDURE — 2580000003 HC RX 258: Performed by: PSYCHIATRY & NEUROLOGY

## 2025-04-23 PROCEDURE — 99222 1ST HOSP IP/OBS MODERATE 55: CPT | Performed by: INTERNAL MEDICINE

## 2025-04-23 PROCEDURE — 97116 GAIT TRAINING THERAPY: CPT

## 2025-04-23 PROCEDURE — 2060000000 HC ICU INTERMEDIATE R&B

## 2025-04-23 PROCEDURE — 80048 BASIC METABOLIC PNL TOTAL CA: CPT

## 2025-04-23 PROCEDURE — 97530 THERAPEUTIC ACTIVITIES: CPT

## 2025-04-23 PROCEDURE — 6360000002 HC RX W HCPCS: Performed by: PSYCHIATRY & NEUROLOGY

## 2025-04-23 PROCEDURE — 97535 SELF CARE MNGMENT TRAINING: CPT

## 2025-04-23 PROCEDURE — 85025 COMPLETE CBC W/AUTO DIFF WBC: CPT

## 2025-04-23 PROCEDURE — 36415 COLL VENOUS BLD VENIPUNCTURE: CPT

## 2025-04-23 PROCEDURE — 97110 THERAPEUTIC EXERCISES: CPT

## 2025-04-23 PROCEDURE — 99232 SBSQ HOSP IP/OBS MODERATE 35: CPT | Performed by: PSYCHIATRY & NEUROLOGY

## 2025-04-23 PROCEDURE — 99233 SBSQ HOSP IP/OBS HIGH 50: CPT | Performed by: PSYCHIATRY & NEUROLOGY

## 2025-04-23 PROCEDURE — 2500000003 HC RX 250 WO HCPCS: Performed by: ANESTHESIOLOGY

## 2025-04-23 PROCEDURE — 6370000000 HC RX 637 (ALT 250 FOR IP): Performed by: PSYCHIATRY & NEUROLOGY

## 2025-04-23 RX ORDER — LISINOPRIL 20 MG/1
20 TABLET ORAL NIGHTLY
Status: DISCONTINUED | OUTPATIENT
Start: 2025-04-23 | End: 2025-04-23

## 2025-04-23 RX ORDER — LISINOPRIL 20 MG/1
20 TABLET ORAL ONCE
Status: COMPLETED | OUTPATIENT
Start: 2025-04-23 | End: 2025-04-23

## 2025-04-23 RX ORDER — LISINOPRIL 20 MG/1
40 TABLET ORAL NIGHTLY
Status: DISCONTINUED | OUTPATIENT
Start: 2025-04-24 | End: 2025-04-24

## 2025-04-23 RX ADMIN — SODIUM CHLORIDE, PRESERVATIVE FREE 10 ML: 5 INJECTION INTRAVENOUS at 20:08

## 2025-04-23 RX ADMIN — SODIUM CHLORIDE: 0.9 INJECTION, SOLUTION INTRAVENOUS at 00:16

## 2025-04-23 RX ADMIN — SODIUM CHLORIDE: 0.9 INJECTION, SOLUTION INTRAVENOUS at 16:24

## 2025-04-23 RX ADMIN — ATORVASTATIN CALCIUM 80 MG: 40 TABLET, FILM COATED ORAL at 20:07

## 2025-04-23 RX ADMIN — SERTRALINE HYDROCHLORIDE 100 MG: 50 TABLET ORAL at 07:21

## 2025-04-23 RX ADMIN — AMLODIPINE BESYLATE 10 MG: 10 TABLET ORAL at 07:22

## 2025-04-23 RX ADMIN — ASPIRIN 81 MG: 81 TABLET, CHEWABLE ORAL at 07:21

## 2025-04-23 RX ADMIN — LISINOPRIL 20 MG: 20 TABLET ORAL at 20:08

## 2025-04-23 RX ADMIN — ENOXAPARIN SODIUM 40 MG: 100 INJECTION SUBCUTANEOUS at 07:22

## 2025-04-23 RX ADMIN — SODIUM CHLORIDE, PRESERVATIVE FREE 10 ML: 5 INJECTION INTRAVENOUS at 07:21

## 2025-04-23 RX ADMIN — LISINOPRIL 20 MG: 20 TABLET ORAL at 07:22

## 2025-04-23 RX ADMIN — MIRTAZAPINE 15 MG: 15 TABLET, FILM COATED ORAL at 20:07

## 2025-04-23 NOTE — CARE COORDINATION
Case Management   Daily Progress Note       Patient Name: Carrington Mulligan                   YOB: 1955  Diagnosis: Acute ischemic left middle cerebral artery (MCA) stroke (HCC) [I63.512]  Cerebrovascular accident (CVA), unspecified mechanism (HCC) [I63.9]                       GMLOS: 3.9 days  Length of Stay: 5  days    Anticipated Discharge Date: To be determined    Readmission Risk (Low < 19, Mod (19-27), High > 27): Readmission Risk Score: 16.8        Current Transitional Plan    [] Home Independently    [] Home with HC    [x] Skilled Nursing Facility    [] Acute Rehabilitation    [] Long Term Acute Care (LTAC)    [] Other:     Plan for the Stay (Medical Management) :          Workflow Continuation (Additional Notes) :    Spoke to patient's guardian regarding transitional needs/services. Ruben believes the patient will likely need to go to rehab for therapy and nursing services before returning back to the group home. He would like referrals be sent to:    Mary at [facility]     Will make determination after identifying who is able to accept.     Chaz Kemp  April 23, 2025

## 2025-04-24 ENCOUNTER — ANESTHESIA EVENT (OUTPATIENT)
Dept: ENDOSCOPY | Age: 70
End: 2025-04-24
Payer: MEDICARE

## 2025-04-24 ENCOUNTER — ANESTHESIA (OUTPATIENT)
Dept: ENDOSCOPY | Age: 70
End: 2025-04-24
Payer: MEDICARE

## 2025-04-24 PROBLEM — E43 SEVERE MALNUTRITION: Status: ACTIVE | Noted: 2025-04-24

## 2025-04-24 PROBLEM — R13.10 DYSPHAGIA: Status: ACTIVE | Noted: 2025-04-24

## 2025-04-24 LAB
ANION GAP SERPL CALCULATED.3IONS-SCNC: 9 MMOL/L (ref 9–16)
BASOPHILS # BLD: 0.08 K/UL (ref 0–0.2)
BASOPHILS NFR BLD: 1 % (ref 0–2)
BUN SERPL-MCNC: 17 MG/DL (ref 8–23)
CALCIUM SERPL-MCNC: 9 MG/DL (ref 8.6–10.4)
CHLORIDE SERPL-SCNC: 107 MMOL/L (ref 98–107)
CO2 SERPL-SCNC: 23 MMOL/L (ref 20–31)
CREAT SERPL-MCNC: 0.7 MG/DL (ref 0.7–1.2)
EOSINOPHIL # BLD: 0.17 K/UL (ref 0–0.44)
EOSINOPHILS RELATIVE PERCENT: 3 % (ref 1–4)
ERYTHROCYTE [DISTWIDTH] IN BLOOD BY AUTOMATED COUNT: 13.9 % (ref 11.8–14.4)
GFR, ESTIMATED: >90 ML/MIN/1.73M2
GLUCOSE BLD-MCNC: 90 MG/DL (ref 75–110)
GLUCOSE SERPL-MCNC: 90 MG/DL (ref 74–99)
HCT VFR BLD AUTO: 38.4 % (ref 40.7–50.3)
HGB BLD-MCNC: 11.9 G/DL (ref 13–17)
IMM GRANULOCYTES # BLD AUTO: 0.03 K/UL (ref 0–0.3)
IMM GRANULOCYTES NFR BLD: 1 %
LYMPHOCYTES NFR BLD: 1.46 K/UL (ref 1.1–3.7)
LYMPHOCYTES RELATIVE PERCENT: 23 % (ref 24–43)
MAGNESIUM SERPL-MCNC: 2.2 MG/DL (ref 1.6–2.4)
MCH RBC QN AUTO: 29.2 PG (ref 25.2–33.5)
MCHC RBC AUTO-ENTMCNC: 31 G/DL (ref 28.4–34.8)
MCV RBC AUTO: 94.1 FL (ref 82.6–102.9)
MONOCYTES NFR BLD: 0.78 K/UL (ref 0.1–1.2)
MONOCYTES NFR BLD: 12 % (ref 3–12)
NEUTROPHILS NFR BLD: 60 % (ref 36–65)
NEUTS SEG NFR BLD: 3.86 K/UL (ref 1.5–8.1)
NRBC BLD-RTO: 0 PER 100 WBC
PLATELET # BLD AUTO: 189 K/UL (ref 138–453)
PMV BLD AUTO: 11.1 FL (ref 8.1–13.5)
POTASSIUM SERPL-SCNC: 3.6 MMOL/L (ref 3.7–5.3)
RBC # BLD AUTO: 4.08 M/UL (ref 4.21–5.77)
SODIUM SERPL-SCNC: 139 MMOL/L (ref 136–145)
WBC OTHER # BLD: 6.4 K/UL (ref 3.5–11.3)

## 2025-04-24 PROCEDURE — 82947 ASSAY GLUCOSE BLOOD QUANT: CPT

## 2025-04-24 PROCEDURE — 83735 ASSAY OF MAGNESIUM: CPT

## 2025-04-24 PROCEDURE — 6370000000 HC RX 637 (ALT 250 FOR IP): Performed by: INTERNAL MEDICINE

## 2025-04-24 PROCEDURE — 6370000000 HC RX 637 (ALT 250 FOR IP)

## 2025-04-24 PROCEDURE — 99232 SBSQ HOSP IP/OBS MODERATE 35: CPT | Performed by: INTERNAL MEDICINE

## 2025-04-24 PROCEDURE — 7100000001 HC PACU RECOVERY - ADDTL 15 MIN: Performed by: INTERNAL MEDICINE

## 2025-04-24 PROCEDURE — 1200000000 HC SEMI PRIVATE

## 2025-04-24 PROCEDURE — 85025 COMPLETE CBC W/AUTO DIFF WBC: CPT

## 2025-04-24 PROCEDURE — 80048 BASIC METABOLIC PNL TOTAL CA: CPT

## 2025-04-24 PROCEDURE — 6360000002 HC RX W HCPCS: Performed by: INTERNAL MEDICINE

## 2025-04-24 PROCEDURE — 7100000000 HC PACU RECOVERY - FIRST 15 MIN: Performed by: INTERNAL MEDICINE

## 2025-04-24 PROCEDURE — 0DH63UZ INSERTION OF FEEDING DEVICE INTO STOMACH, PERCUTANEOUS APPROACH: ICD-10-PCS | Performed by: INTERNAL MEDICINE

## 2025-04-24 PROCEDURE — 3609013300 HC EGD TUBE PLACEMENT: Performed by: INTERNAL MEDICINE

## 2025-04-24 PROCEDURE — 99222 1ST HOSP IP/OBS MODERATE 55: CPT | Performed by: INTERNAL MEDICINE

## 2025-04-24 PROCEDURE — 3700000000 HC ANESTHESIA ATTENDED CARE: Performed by: INTERNAL MEDICINE

## 2025-04-24 PROCEDURE — 2580000003 HC RX 258: Performed by: NURSE PRACTITIONER

## 2025-04-24 PROCEDURE — 2500000003 HC RX 250 WO HCPCS: Performed by: NURSE ANESTHETIST, CERTIFIED REGISTERED

## 2025-04-24 PROCEDURE — 43239 EGD BIOPSY SINGLE/MULTIPLE: CPT | Performed by: INTERNAL MEDICINE

## 2025-04-24 PROCEDURE — 36415 COLL VENOUS BLD VENIPUNCTURE: CPT

## 2025-04-24 PROCEDURE — 99233 SBSQ HOSP IP/OBS HIGH 50: CPT | Performed by: PSYCHIATRY & NEUROLOGY

## 2025-04-24 PROCEDURE — 3609012400 HC EGD TRANSORAL BIOPSY SINGLE/MULTIPLE: Performed by: INTERNAL MEDICINE

## 2025-04-24 PROCEDURE — 0DB78ZX EXCISION OF STOMACH, PYLORUS, VIA NATURAL OR ARTIFICIAL OPENING ENDOSCOPIC, DIAGNOSTIC: ICD-10-PCS | Performed by: INTERNAL MEDICINE

## 2025-04-24 PROCEDURE — 3E0G76Z INTRODUCTION OF NUTRITIONAL SUBSTANCE INTO UPPER GI, VIA NATURAL OR ARTIFICIAL OPENING: ICD-10-PCS | Performed by: INTERNAL MEDICINE

## 2025-04-24 PROCEDURE — 99232 SBSQ HOSP IP/OBS MODERATE 35: CPT | Performed by: PSYCHIATRY & NEUROLOGY

## 2025-04-24 PROCEDURE — 43246 EGD PLACE GASTROSTOMY TUBE: CPT | Performed by: INTERNAL MEDICINE

## 2025-04-24 PROCEDURE — 2720000010 HC SURG SUPPLY STERILE: Performed by: INTERNAL MEDICINE

## 2025-04-24 PROCEDURE — 6360000002 HC RX W HCPCS

## 2025-04-24 PROCEDURE — 88305 TISSUE EXAM BY PATHOLOGIST: CPT

## 2025-04-24 PROCEDURE — 3700000001 HC ADD 15 MINUTES (ANESTHESIA): Performed by: INTERNAL MEDICINE

## 2025-04-24 PROCEDURE — 2500000003 HC RX 250 WO HCPCS: Performed by: INTERNAL MEDICINE

## 2025-04-24 PROCEDURE — 6360000002 HC RX W HCPCS: Performed by: NURSE ANESTHETIST, CERTIFIED REGISTERED

## 2025-04-24 PROCEDURE — 2709999900 HC NON-CHARGEABLE SUPPLY: Performed by: INTERNAL MEDICINE

## 2025-04-24 RX ORDER — ENOXAPARIN SODIUM 100 MG/ML
30 INJECTION SUBCUTANEOUS DAILY
Status: DISCONTINUED | OUTPATIENT
Start: 2025-04-24 | End: 2025-04-25

## 2025-04-24 RX ORDER — AMLODIPINE BESYLATE 10 MG/1
10 TABLET ORAL DAILY
Status: DISCONTINUED | OUTPATIENT
Start: 2025-04-25 | End: 2025-04-30 | Stop reason: HOSPADM

## 2025-04-24 RX ORDER — LIDOCAINE HYDROCHLORIDE 10 MG/ML
INJECTION, SOLUTION EPIDURAL; INFILTRATION; INTRACAUDAL; PERINEURAL
Status: DISCONTINUED | OUTPATIENT
Start: 2025-04-24 | End: 2025-04-24 | Stop reason: SDUPTHER

## 2025-04-24 RX ORDER — PROPOFOL 10 MG/ML
INJECTION, EMULSION INTRAVENOUS
Status: DISCONTINUED | OUTPATIENT
Start: 2025-04-24 | End: 2025-04-24 | Stop reason: SDUPTHER

## 2025-04-24 RX ORDER — ONDANSETRON 2 MG/ML
4 INJECTION INTRAMUSCULAR; INTRAVENOUS EVERY 6 HOURS PRN
Status: DISCONTINUED | OUTPATIENT
Start: 2025-04-24 | End: 2025-04-30 | Stop reason: HOSPADM

## 2025-04-24 RX ORDER — ASPIRIN 81 MG/1
81 TABLET, CHEWABLE ORAL DAILY
Status: DISCONTINUED | OUTPATIENT
Start: 2025-04-24 | End: 2025-04-26

## 2025-04-24 RX ORDER — ASPIRIN 81 MG/1
81 TABLET ORAL DAILY
Status: DISCONTINUED | OUTPATIENT
Start: 2025-04-24 | End: 2025-04-24

## 2025-04-24 RX ORDER — POTASSIUM CHLORIDE 1500 MG/1
40 TABLET, EXTENDED RELEASE ORAL ONCE
Status: DISCONTINUED | OUTPATIENT
Start: 2025-04-24 | End: 2025-04-24

## 2025-04-24 RX ORDER — POTASSIUM CHLORIDE 7.45 MG/ML
10 INJECTION INTRAVENOUS
Status: COMPLETED | OUTPATIENT
Start: 2025-04-24 | End: 2025-04-24

## 2025-04-24 RX ORDER — DEXMEDETOMIDINE HYDROCHLORIDE 100 UG/ML
INJECTION, SOLUTION INTRAVENOUS
Status: DISCONTINUED | OUTPATIENT
Start: 2025-04-24 | End: 2025-04-24 | Stop reason: SDUPTHER

## 2025-04-24 RX ORDER — ATORVASTATIN CALCIUM 40 MG/1
40 TABLET, FILM COATED ORAL NIGHTLY
Status: DISCONTINUED | OUTPATIENT
Start: 2025-04-24 | End: 2025-04-30 | Stop reason: HOSPADM

## 2025-04-24 RX ORDER — ONDANSETRON 4 MG/1
4 TABLET, ORALLY DISINTEGRATING ORAL EVERY 8 HOURS PRN
Status: DISCONTINUED | OUTPATIENT
Start: 2025-04-24 | End: 2025-04-30 | Stop reason: HOSPADM

## 2025-04-24 RX ORDER — LABETALOL HYDROCHLORIDE 5 MG/ML
10 INJECTION, SOLUTION INTRAVENOUS ONCE
Status: COMPLETED | OUTPATIENT
Start: 2025-04-24 | End: 2025-04-24

## 2025-04-24 RX ORDER — ATORVASTATIN CALCIUM 40 MG/1
80 TABLET, FILM COATED ORAL NIGHTLY
Status: DISCONTINUED | OUTPATIENT
Start: 2025-04-24 | End: 2025-04-24

## 2025-04-24 RX ORDER — LISINOPRIL 20 MG/1
40 TABLET ORAL NIGHTLY
Status: DISCONTINUED | OUTPATIENT
Start: 2025-04-24 | End: 2025-04-30 | Stop reason: HOSPADM

## 2025-04-24 RX ADMIN — HYDRALAZINE HYDROCHLORIDE 10 MG: 20 INJECTION INTRAMUSCULAR; INTRAVENOUS at 23:10

## 2025-04-24 RX ADMIN — LIDOCAINE HYDROCHLORIDE 50 MG: 10 INJECTION, SOLUTION EPIDURAL; INFILTRATION; INTRACAUDAL; PERINEURAL at 11:32

## 2025-04-24 RX ADMIN — MIRTAZAPINE 15 MG: 15 TABLET, FILM COATED ORAL at 22:00

## 2025-04-24 RX ADMIN — POTASSIUM CHLORIDE 10 MEQ: 7.45 INJECTION INTRAVENOUS at 14:22

## 2025-04-24 RX ADMIN — POTASSIUM CHLORIDE 10 MEQ: 7.45 INJECTION INTRAVENOUS at 08:47

## 2025-04-24 RX ADMIN — PROPOFOL 50 MG: 10 INJECTION, EMULSION INTRAVENOUS at 11:37

## 2025-04-24 RX ADMIN — Medication 10 MG: at 08:39

## 2025-04-24 RX ADMIN — ASPIRIN 81 MG: 81 TABLET, CHEWABLE ORAL at 14:23

## 2025-04-24 RX ADMIN — SERTRALINE HYDROCHLORIDE 100 MG: 50 TABLET ORAL at 14:23

## 2025-04-24 RX ADMIN — Medication 2 G: at 11:42

## 2025-04-24 RX ADMIN — LISINOPRIL 40 MG: 20 TABLET ORAL at 22:00

## 2025-04-24 RX ADMIN — DEXMEDETOMIDINE HCL 8 MCG: 100 INJECTION INTRAVENOUS at 11:32

## 2025-04-24 RX ADMIN — PROPOFOL 50 MG: 10 INJECTION, EMULSION INTRAVENOUS at 11:32

## 2025-04-24 RX ADMIN — ATORVASTATIN CALCIUM 40 MG: 40 TABLET, FILM COATED ORAL at 22:00

## 2025-04-24 RX ADMIN — PROPOFOL 50 MG: 10 INJECTION, EMULSION INTRAVENOUS at 11:41

## 2025-04-24 RX ADMIN — ENOXAPARIN SODIUM 30 MG: 100 INJECTION SUBCUTANEOUS at 22:00

## 2025-04-24 RX ADMIN — DEXMEDETOMIDINE HCL 8 MCG: 100 INJECTION INTRAVENOUS at 11:30

## 2025-04-24 RX ADMIN — SODIUM CHLORIDE, PRESERVATIVE FREE 10 ML: 5 INJECTION INTRAVENOUS at 22:00

## 2025-04-24 RX ADMIN — SODIUM CHLORIDE: 9 INJECTION, SOLUTION INTRAVENOUS at 11:26

## 2025-04-24 ASSESSMENT — PAIN - FUNCTIONAL ASSESSMENT: PAIN_FUNCTIONAL_ASSESSMENT: FACE, LEGS, ACTIVITY, CRY, AND CONSOLABILITY (FLACC)

## 2025-04-24 NOTE — ANESTHESIA PRE PROCEDURE
Department of Anesthesiology  Preprocedure Note       Name:  Carrington Mulligan   Age:  69 y.o.  :  1955                                          MRN:  [id number]         Date:  2025      Surgeon: Surgeon(s):  Avtar Bhatia MD    Procedure: Procedure(s):  ESOPHAGOGASTRODUODENOSCOPY PERCUTANEOUS ENDOSCOPIC GASTROSTOMY TUBE INSERTION  ESOPHAGOGASTRODUODENOSCOPY BIOPSY    Medications prior to admission:   Prior to Admission medications    Medication Sig Start Date End Date Taking? Authorizing Provider   neomycin-bacitracin-polymyxin (NEOSPORIN) 3.5-400-5000 OINT ointment APPLY TO AFFECTED AREA(S) TOPICALLY 4 TIMES DAILY AS NEEDED 25   John Santacruz MD   psyllium (REGULOID) 400 MG capsule Take 1 capsule by mouth in the morning and at bedtime    Tito Rojas MD   chlorhexidine (PERIDEX) 0.12 % solution (REQUEST REFILL WHEN NEEDED) USE ORALLY TO RINSE TWICE DAILY WITH COTTON SWAB 3/21/25   John Santacruz MD   sertraline (ZOLOFT) 100 MG tablet Take 1 tablet by mouth daily 25   Tito Rojas MD   mirtazapine (REMERON) 15 MG tablet Take 1 tablet by mouth nightly Start Remeron 7.5 mg nightly for 2 weeks then after that 2 tablets nightly 2 weeks. 3/4/25   John Santacruz MD   Calcium Carb-Cholecalciferol (OYSTER SHELL CALCIUM W/D) 500-5 MG-MCG TABS tablet TAKE 1 TABLET BY MOUTH TWICE DAILY 24   John Santacruz MD   omeprazole (PRILOSEC) 20 MG delayed release capsule TAKE 2 CAPSULES (40MG) BY MOUTH ONCE EVERY DAY 24   John Santacruz MD   oxyBUTYnin (DITROPAN-XL) 5 MG extended release tablet TAKE 1 TABLET BY MOUTH ONCE EVERY DAY 24   John Santacruz MD   cetirizine (ZYRTEC) 10 MG tablet Take 1 tablet by mouth daily 24   John Santacruz MD   Acetaminophen Extra Strength 500 MG TABS TAKE 2 TABLETS (1000MG) BY MOUTH EVERY 8 HOURS AS NEEDED FOR PAIN OR FEVER GREATER THAN 100.4 *MAX 6 TABS IN 12HRS* 24   John Santacruz MD

## 2025-04-24 NOTE — OP NOTE
Esophagogastroduodenoscopy (EGD) + PEG Placement Procedure Note    Procedure:  EGD with biopsies, PEG tube placement    Indications: Dysphagia, need for long-term nutrition    Sedation: MAC    Procedure Date: 4/24/2025    Attending Physician:  Dr. Avtar Bhatia MD    Assistant:  None    Procedure Details:    Informed consent was obtained for the procedure, including sedation. Risks of infection, perforation, hemorrhage, adverse drug reaction, and aspiration were discussed. The patient was placed in the left lateral decubitus position. The patient was monitored continuously with ECG tracing, pulse oximetry, blood pressure monitoring, and direct observation.      The gastroscope was inserted into the mouth and advanced under direct vision to second portion of the duodenum.  A careful inspection was made as the gastroscope was withdrawn, including a retroflexed view of the proximal stomach; findings and interventions are described below. Appropriate photodocumentation was obtained.    Findings:  The stomach appeared somewhat deformed.  Erosions noted within the stomach body, biopsies obtained    The stomach was transilluminated and an optimal position for the PEG tube was identified using the single poke method. The skin was infiltrated with local and the needle and sheath were inserted through the abdomen into the stomach under direct visualization. The needle was removed and a guidewire was inserted through the sheath. The guidewire was grasped from above with a snare. It was removed completely and the 20 Fr PEG tube was secured to the guidewire.     The guidewire and PEG tube were then pulled through the mouth and esophagus and snug to the abdominal wall. There was no evidence of bleeding. Photos were taken. The Bolster was placed on the PEG site. The external marking of the bolster was 3.5 cm. The patient tolerated the procedure well and was transferred to recovery room in stable condition.       Complications:

## 2025-04-24 NOTE — CARE COORDINATION
Case Management   Daily Progress Note       Patient Name: Carrington Mulligan                   YOB: 1955  Diagnosis: Acute ischemic left middle cerebral artery (MCA) stroke (HCC) [I63.512]  Cerebrovascular accident (CVA), unspecified mechanism (HCC) [I63.9]                       GMLOS: 3.9 days  Length of Stay: 6  days    Anticipated Discharge Date: One day until discharge    Readmission Risk (Low < 19, Mod (19-27), High > 27): Readmission Risk Score: 17.1        Current Transitional Plan    [] Home Independently    [] Home with HC    [x] Skilled Nursing Facility    [] Acute Rehabilitation    [] Long Term Acute Care (LTAC)    [] Other:     Plan for the Stay (Medical Management) :          Workflow Continuation (Additional Notes) :    Received a call from [facility] informing they are able to accept for SNF services.     Called and left a  with Mary at [facility] regarding status of referral. Called [facility] and was informed no bed availability at this time.     Faxed additional clinical documentation to [facility] for review.     Chza Kemp  April 24, 2025

## 2025-04-25 LAB
ANION GAP SERPL CALCULATED.3IONS-SCNC: 10 MMOL/L (ref 9–16)
BASOPHILS # BLD: 0.06 K/UL (ref 0–0.2)
BASOPHILS NFR BLD: 1 % (ref 0–2)
BUN SERPL-MCNC: 17 MG/DL (ref 8–23)
CALCIUM SERPL-MCNC: 8.9 MG/DL (ref 8.6–10.4)
CHLORIDE SERPL-SCNC: 107 MMOL/L (ref 98–107)
CO2 SERPL-SCNC: 21 MMOL/L (ref 20–31)
CREAT SERPL-MCNC: 0.6 MG/DL (ref 0.7–1.2)
EOSINOPHIL # BLD: 0.21 K/UL (ref 0–0.44)
EOSINOPHILS RELATIVE PERCENT: 3 % (ref 1–4)
ERYTHROCYTE [DISTWIDTH] IN BLOOD BY AUTOMATED COUNT: 13.9 % (ref 11.8–14.4)
GFR, ESTIMATED: >90 ML/MIN/1.73M2
GLUCOSE BLD-MCNC: 86 MG/DL (ref 75–110)
GLUCOSE SERPL-MCNC: 90 MG/DL (ref 74–99)
HCT VFR BLD AUTO: 37.4 % (ref 40.7–50.3)
HGB BLD-MCNC: 12.6 G/DL (ref 13–17)
IMM GRANULOCYTES # BLD AUTO: 0.04 K/UL (ref 0–0.3)
IMM GRANULOCYTES NFR BLD: 1 %
LYMPHOCYTES NFR BLD: 1.18 K/UL (ref 1.1–3.7)
LYMPHOCYTES RELATIVE PERCENT: 15 % (ref 24–43)
MCH RBC QN AUTO: 29.4 PG (ref 25.2–33.5)
MCHC RBC AUTO-ENTMCNC: 33.7 G/DL (ref 28.4–34.8)
MCV RBC AUTO: 87.4 FL (ref 82.6–102.9)
MONOCYTES NFR BLD: 0.83 K/UL (ref 0.1–1.2)
MONOCYTES NFR BLD: 11 % (ref 3–12)
NEUTROPHILS NFR BLD: 70 % (ref 36–65)
NEUTS SEG NFR BLD: 5.37 K/UL (ref 1.5–8.1)
NRBC BLD-RTO: 0 PER 100 WBC
PLATELET # BLD AUTO: 195 K/UL (ref 138–453)
PMV BLD AUTO: 10.8 FL (ref 8.1–13.5)
POTASSIUM SERPL-SCNC: 3.7 MMOL/L (ref 3.7–5.3)
RBC # BLD AUTO: 4.28 M/UL (ref 4.21–5.77)
SODIUM SERPL-SCNC: 138 MMOL/L (ref 136–145)
WBC OTHER # BLD: 7.7 K/UL (ref 3.5–11.3)

## 2025-04-25 PROCEDURE — 6370000000 HC RX 637 (ALT 250 FOR IP)

## 2025-04-25 PROCEDURE — 6370000000 HC RX 637 (ALT 250 FOR IP): Performed by: NURSE PRACTITIONER

## 2025-04-25 PROCEDURE — 2500000003 HC RX 250 WO HCPCS: Performed by: INTERNAL MEDICINE

## 2025-04-25 PROCEDURE — 6370000000 HC RX 637 (ALT 250 FOR IP): Performed by: INTERNAL MEDICINE

## 2025-04-25 PROCEDURE — 82947 ASSAY GLUCOSE BLOOD QUANT: CPT

## 2025-04-25 PROCEDURE — 1200000000 HC SEMI PRIVATE

## 2025-04-25 PROCEDURE — 36415 COLL VENOUS BLD VENIPUNCTURE: CPT

## 2025-04-25 PROCEDURE — 99232 SBSQ HOSP IP/OBS MODERATE 35: CPT | Performed by: PSYCHIATRY & NEUROLOGY

## 2025-04-25 PROCEDURE — 85025 COMPLETE CBC W/AUTO DIFF WBC: CPT

## 2025-04-25 PROCEDURE — 99233 SBSQ HOSP IP/OBS HIGH 50: CPT | Performed by: PSYCHIATRY & NEUROLOGY

## 2025-04-25 PROCEDURE — 99231 SBSQ HOSP IP/OBS SF/LOW 25: CPT | Performed by: INTERNAL MEDICINE

## 2025-04-25 PROCEDURE — 99232 SBSQ HOSP IP/OBS MODERATE 35: CPT | Performed by: INTERNAL MEDICINE

## 2025-04-25 PROCEDURE — 2580000003 HC RX 258: Performed by: INTERNAL MEDICINE

## 2025-04-25 PROCEDURE — 6360000002 HC RX W HCPCS: Performed by: INTERNAL MEDICINE

## 2025-04-25 PROCEDURE — 6360000002 HC RX W HCPCS

## 2025-04-25 PROCEDURE — 80048 BASIC METABOLIC PNL TOTAL CA: CPT

## 2025-04-25 RX ORDER — ENOXAPARIN SODIUM 100 MG/ML
40 INJECTION SUBCUTANEOUS DAILY
Status: DISCONTINUED | OUTPATIENT
Start: 2025-04-25 | End: 2025-04-26

## 2025-04-25 RX ORDER — POTASSIUM CHLORIDE 1500 MG/1
40 TABLET, EXTENDED RELEASE ORAL ONCE
Status: DISCONTINUED | OUTPATIENT
Start: 2025-04-25 | End: 2025-04-25

## 2025-04-25 RX ORDER — ACETAMINOPHEN 325 MG/1
650 TABLET ORAL EVERY 4 HOURS PRN
Status: DISCONTINUED | OUTPATIENT
Start: 2025-04-25 | End: 2025-04-30 | Stop reason: HOSPADM

## 2025-04-25 RX ADMIN — ACETAMINOPHEN 650 MG: 325 TABLET ORAL at 15:24

## 2025-04-25 RX ADMIN — ENOXAPARIN SODIUM 40 MG: 100 INJECTION SUBCUTANEOUS at 20:51

## 2025-04-25 RX ADMIN — AMLODIPINE BESYLATE 10 MG: 10 TABLET ORAL at 10:01

## 2025-04-25 RX ADMIN — LISINOPRIL 40 MG: 20 TABLET ORAL at 20:52

## 2025-04-25 RX ADMIN — MIRTAZAPINE 15 MG: 15 TABLET, FILM COATED ORAL at 20:52

## 2025-04-25 RX ADMIN — SODIUM CHLORIDE: 0.9 INJECTION, SOLUTION INTRAVENOUS at 17:04

## 2025-04-25 RX ADMIN — HYDRALAZINE HYDROCHLORIDE 10 MG: 20 INJECTION INTRAMUSCULAR; INTRAVENOUS at 03:58

## 2025-04-25 RX ADMIN — ATORVASTATIN CALCIUM 40 MG: 40 TABLET, FILM COATED ORAL at 20:52

## 2025-04-25 RX ADMIN — SODIUM CHLORIDE, PRESERVATIVE FREE 10 ML: 5 INJECTION INTRAVENOUS at 20:52

## 2025-04-25 RX ADMIN — SODIUM CHLORIDE: 0.9 INJECTION, SOLUTION INTRAVENOUS at 04:00

## 2025-04-25 RX ADMIN — LANSOPRAZOLE 30 MG: 15 TABLET, ORALLY DISINTEGRATING ORAL at 10:03

## 2025-04-25 RX ADMIN — POTASSIUM BICARBONATE 40 MEQ: 782 TABLET, EFFERVESCENT ORAL at 15:24

## 2025-04-25 RX ADMIN — SODIUM CHLORIDE, PRESERVATIVE FREE 10 ML: 5 INJECTION INTRAVENOUS at 10:02

## 2025-04-25 RX ADMIN — ASPIRIN 81 MG: 81 TABLET, CHEWABLE ORAL at 10:01

## 2025-04-25 RX ADMIN — SERTRALINE HYDROCHLORIDE 100 MG: 50 TABLET ORAL at 10:01

## 2025-04-25 ASSESSMENT — PAIN SCALES - GENERAL: PAINLEVEL_OUTOF10: 1

## 2025-04-25 NOTE — ANESTHESIA POSTPROCEDURE EVALUATION
Department of Anesthesiology  Postprocedure Note    Patient: Carrington Mulligan  MRN: [id number]  YOB: 1955  Date of evaluation: 4/25/2025    Procedure Summary       Date: 04/24/25 Room / Location: [place] [address] / [facility]    Anesthesia Start: 1126 Anesthesia Stop: 1205    Procedures:       ESOPHAGOGASTRODUODENOSCOPY PERCUTANEOUS ENDOSCOPIC GASTROSTOMY TUBE INSERTION      ESOPHAGOGASTRODUODENOSCOPY BIOPSY Diagnosis:       Esophageal dysphagia      (Esophageal dysphagia [R13.19])    Surgeons: Avtar Bhatia MD Responsible Provider: Yana Jacobsen MD    Anesthesia Type: MAC, general ASA Status: 3            Anesthesia Type: No value filed.    Mele Phase I: Mele Score: 10    Mele Phase II: Mele Score: 8    Anesthesia Post Evaluation    Patient location during evaluation: bedside  Patient participation: complete - patient participated  Level of consciousness: awake and awake and alert  Pain score: 2  Airway patency: patent  Nausea & Vomiting: no nausea and no vomiting  Cardiovascular status: blood pressure returned to baseline and hemodynamically stable  Respiratory status: acceptable  Hydration status: euvolemic  Pain management: adequate        No notable events documented.

## 2025-04-25 NOTE — CARE COORDINATION
Case Management   Daily Progress Note       Patient Name: Carrington Mulligan                   YOB: 1955  Diagnosis: Acute ischemic left middle cerebral artery (MCA) stroke (HCC) [I63.512]  Cerebrovascular accident (CVA), unspecified mechanism (HCC) [I63.9]                       GMLOS: 7.3 days  Length of Stay: 7  days    Anticipated Discharge Date: Two or more days before discharge    Readmission Risk (Low < 19, Mod (19-27), High > 27): Readmission Risk Score: 17.8        Current Transitional Plan    [] Home Independently    [] Home with HC    [x] Skilled Nursing Facility    [] Acute Rehabilitation    [] Long Term Acute Care (LTAC)    [] Other:     Plan for the Stay (Medical Management) :  PEG placed yesterday . Plans to start Eliquis tomorrow. Repeat CT head on Sunday         Workflow Continuation (Additional Notes) :  Per CM notes pt accepted to [facility] in [place] .   1430-Spoke w legal guardian Sujatha Lopez and notified of all the above and potential d/c Sunday or Monday. States understanding and denies any questions at this time.     LACHELLE REGALADO  April 25, 2025

## 2025-04-25 NOTE — PROGRESS NOTES
Physician Progress Note      PATIENT:               MITCHELL ERICKSON  CSN #:                  [id number]  :                       1955  ADMIT DATE:       2025 4:57 PM  DISCH DATE:        2025 4:55 PM  RESPONDING  PROVIDER #:        John Santacruz MD          QUERY TEXT:    A mental status change is documented in the medical record in the H&P.  Please   specify the underlying cause:    The clinical indicators include:  --Per H&P, patient has autism spectrum disorder and is nonverbal at baseline,   admitted with UTI  --Per H&P, patient has delirium  --Per assessment, MARIO orientation  --MAR reflect IV Rocephin  Options provided:  -- Metabolic encephalopathy  -- Encephalopathy ruled out  -- Other - I will add my own diagnosis  -- Disagree - Not applicable / Not valid  -- Disagree - Clinically unable to determine / Unknown  -- Refer to Clinical Documentation Reviewer    PROVIDER RESPONSE TEXT:    This patient has metabolic encephalopathy.    Query created by: Anita Ugarte on 2025 12:24 PM      Electronically signed by:  John Santacruz MD 2025 4:32 PM

## 2025-04-25 NOTE — CONSULTS
Stroke Neurology Consult Note  Stroke Consult @ 1319  Arrival at bedside @ 1327    Reason for Consult:  ED Stroke Consult  Requesting Physician:  ED team   Endovascular Neurosurgeon: Leland Ott MD  Stroke Team: Leland Ott MD  History Obtained From:  electronic medical record  Chief Complaint:  Acute neurological deficits   Allergies:  Patient has no known allergies.    History of Presenting Illness     Carrington Mulligan is a 69 y.o. male with a history of autism/developmental delay (nonverbal) and seizures who presents as a transfer from [facility] for an acute left M1 MCA occlusion.  Patient is reportedly nonverbal at baseline and resides at a group home.  He initially presented to outside hospital after being found on the ground this morning with right-sided weakness.  LKW 4/17 around 2200 before bed.  Initial .  Evaluated by Dr. Ott via telestroke.  NIHSS 8 for new right hemiparesis.  CT head without contrast showed possible developing infarct in the left MCA territory.  CTA head/neck with contrast showed severe stenosis versus occlusion with distal M1 portion of left MCA extending into the M2 branch.  Given aspirin 300mg rectally.  Transferred to [facility] ED for neuroendovascular evaluation.  Stroke consult called on arrival.  NIHSS 23; nonverbal at baseline, left gaze deviation, no blink to threat on the right, right facial droop, right upper and lower extremity weakness.  .  Stat CT brain perfusion showed small to moderate size penumbra in the left frontal lobe.  Case discussed with neuroendovascular; plan for cerebral angiogram with mechanical thrombectomy.    LKW: 4/17/25 2200  NIHSS: 23  Modified Colleen Scale: 3  SBP: 150-170's  Glucose: 105  CT head without contrast: Hypoattenuation with loss of gray-white differentiation left MCA territory concerning for acute  TNK: N/A, out of window  Endovascular: Plan for mechanical thrombectomy    Review of Systems   Reason unable to perform ROS: 
Comprehensive Nutrition Assessment    Type and Reason for Visit:  Consult    Nutrition Recommendations/Plan:   Standard with Fiber at 10ml/hr with goal rate of 40ml/hr (provides: 1440kcals, 61g protein, 730ml of water).   Advance tube feeding by 10ml q4hrs until goal rate is achieved.   Free water flush of 30ml q4hr or per medical team.   Monitor and replace electrolytes prn.   Monitor EN tolerance, weight, labs.      Malnutrition Assessment:  Malnutrition Status:  Severe malnutrition (04/24/25 0969)    Context:  Acute Illness     Findings of the 6 clinical characteristics of malnutrition:  Energy Intake:  50% or less of estimated energy requirements for 5 or more days  Weight Loss:  Greater than 2% over 1 week     Body Fat Loss:  No body fat loss     Muscle Mass Loss:  No muscle mass loss    Fluid Accumulation:  No fluid accumulation     Strength:  Not Performed    Nutrition Assessment:    Consult for tube feeding order and management. Patient remains NPO. PEG placed on 4/24. Per GI okay to start tube feeds. Spoke with RN, discussed EN regimen.    Nutrition Related Findings:    Meds/Labs reviewed.  Meds include Remeron 15 mg.  No edema.  No BM documented this admission. Wound Type: Pressure Injury, Stage II       Current Nutrition Intake & Therapies:    Average Meal Intake: NPO  Average Supplements Intake: NPO  Diet NPO    Anthropometric Measures:  Height: 157.5 cm (5' 2.01\")  Ideal Body Weight (IBW): 118 lbs (54 kg)    Admission Body Weight: 60.3 kg (132 lb 15 oz)  Current Body Weight: 55.8 kg (123 lb 0.3 oz), 109.7 % IBW. Weight Source: Bed scale  Current BMI (kg/m2): 22.5  Usual Body Weight: 50.3 kg (110 lb 14.3 oz) (February 2025)     % Weight Change (Calculated): 16.7  Weight Adjustment For: No Adjustment  BMI Categories: Normal Weight (BMI 18.5-24.9)    Estimated Daily Nutrient Needs:  Energy Requirements Based On: Kcal/kg  Weight Used for Energy Requirements: Current  Energy (kcal/day): 7637-4495 
Provider   neomycin-bacitracin-polymyxin (NEOSPORIN) 3.5-400-5000 OINT ointment APPLY TO AFFECTED AREA(S) TOPICALLY 4 TIMES DAILY AS NEEDED 4/17/25   John Santacruz MD   psyllium (REGULOID) 400 MG capsule Take 1 capsule by mouth in the morning and at bedtime    Tito Rojas MD   chlorhexidine (PERIDEX) 0.12 % solution (REQUEST REFILL WHEN NEEDED) USE ORALLY TO RINSE TWICE DAILY WITH COTTON SWAB 3/21/25   John Santacruz MD   sertraline (ZOLOFT) 100 MG tablet Take 1 tablet by mouth daily 2/25/25   Tito Rojas MD   mirtazapine (REMERON) 15 MG tablet Take 1 tablet by mouth nightly Start Remeron 7.5 mg nightly for 2 weeks then after that 2 tablets nightly 2 weeks. 3/4/25   John Santacruz MD   Calcium Carb-Cholecalciferol (OYSTER SHELL CALCIUM W/D) 500-5 MG-MCG TABS tablet TAKE 1 TABLET BY MOUTH TWICE DAILY 11/13/24   John Santacruz MD   omeprazole (PRILOSEC) 20 MG delayed release capsule TAKE 2 CAPSULES (40MG) BY MOUTH ONCE EVERY DAY 11/13/24   John Santacruz MD   oxyBUTYnin (DITROPAN-XL) 5 MG extended release tablet TAKE 1 TABLET BY MOUTH ONCE EVERY DAY 11/13/24   John Santacruz MD   cetirizine (ZYRTEC) 10 MG tablet Take 1 tablet by mouth daily 8/19/24   John Santacruz MD   Acetaminophen Extra Strength 500 MG TABS TAKE 2 TABLETS (1000MG) BY MOUTH EVERY 8 HOURS AS NEEDED FOR PAIN OR FEVER GREATER THAN 100.4 *MAX 6 TABS IN 12HRS* 7/31/24   John Santacruz MD   Disposable Gloves MISC 1 pair as directed QD-BID 4/10/23   John Santacruz MD   Incontinence Supply Disposable (FQ PROTECTIVE UNDERWEAR) MISC USE AS DIRECTED FOR INCONTINENCE (319) *CALL PHARMACY FOR REFILLS*    SIZE SMALL/MEDIUM 7/26/21   John Santacruz MD   QUEtiapine (SEROQUEL) 25 MG tablet Take 1 tablet by mouth nightly    Tito Rojas MD   Disposable Gloves (VINYL GLOVES LARGE) MISC 1 PAIR AS DIRECTED QD-BID 6/28/17   John Santacruz MD     .  Current Medications:  Scheduled Meds:   
tobacco.   reports no history of alcohol use.   reports no history of drug use.  Family History  family history is not on file.    Review of Systems:  NA given current condition and baseline,       OBJECTIVE:     Vitals:    25 1400   BP: (!) 161/58   Pulse: 58   Resp: 14   Temp:    SpO2: 100%        General:  Gen: normal habitus, NAD  HEENT: NCAT, mucosa moist  Cvs: RRR, S1 S2 normal  Resp: symmetric unlabored breathing  Abd: s/nd/nt  Ext: no edema  Skin: no lesions seen, warm and dry    Neuro:  Gen: awake and alert, non verbal at baseline.   Lang/speech: non verbal at baseline, does not follow commands  CN: left forced gaze deviation not overcome. Rt sided facial droop. Rt homonomous hemianopsia   Motor: grossly 2/5 UE and LE of the right side. Left upper and lower 4/5  Sense: decreased sensation on nailbed pressure over the right hemibody  Coord: deferred  DTR: deferred  Gait: narrow base gait    NIH Stroke Scale:   1a  Level of consciousness: 0 - alert; keenly responsive   1b. LOC questions:  2 - answers neither question correctly   1c. LOC commands: 2 - performs neither task correctly   2.  Best Gaze: 2 - forced deviation, or total gaze paresis not overcome by oculocephalic maneuver   3. Visual: 2 - complete hemianopia   4. Facial Palsy: 2 - partial paralysis (total or near total paralysis of the lower face)   5a. Motor left arm: 0 - no drift, limb holds 90 (or 45) degrees for full 10 seconds   5b.  Motor right arm: 3 - no effort against gravity, limb falls   6a. Motor left le - no drift; leg holds 30 degree position for full 5 seconds   6b  Motor right leg:  3 - no effort against gravity; leg falls to bed immediately   7. Limb Ataxia: 0 - absent   8.  Sensory: 1 - mild to moderate sensory loss; patient feels pinprick is less sharp or is dull on the affected side; there is a loss of superficial pain with pinprick but patient is aware of being touched    9. Best Language:  3 - mute, global aphasia; no 
AM     BNP: No results for input(s): \"BNP\", \"PROBNP\" in the last 72 hours.  BMP:  Lab Results   Component Value Date/Time     04/22/2025 07:04 AM    K 3.7 04/22/2025 07:04 AM     04/22/2025 07:04 AM    CO2 23 04/22/2025 07:04 AM    BUN 11 04/22/2025 07:04 AM    CREATININE 0.7 04/22/2025 07:04 AM    CALCIUM 9.1 04/22/2025 07:04 AM    GFRAA >60 05/09/2022 09:02 AM    LABGLOM >90 04/22/2025 07:04 AM    LABGLOM >60 07/11/2023 08:54 AM    GLUCOSE 129 04/22/2025 07:04 AM    GLUCOSE 90 05/08/2012 09:19 AM     LIVER PROFILE:No results for input(s): \"AST\", \"ALT\", \"LABALBU\", \"ALKPHOS\", \"BILITOT\", \"BILIDIR\", \"IBILI\", \"GLOB\", \"ALBUMIN\" in the last 72 hours.    Invalid input(s): \"PROT\"  FLP:        Component Value Date/Time    CHOL 90 04/19/2025 0823    HDL 31 (L) 04/19/2025 0823    LDL 47 04/19/2025 0823    TRIG 60 04/19/2025 0823     HGA1C:    Recent Labs     04/19/25  0823   LABA1C 4.7           ASSESSMENT/PLAN:  1. Acute stroke with concerns of embolic etiology and presence of interatrial septal defect therefore we suggest to use anticoagulation with apixaban instead of dual antiplatelet therapy to prevent future events  -recommend switching aspirin and Plavix to apixaban 5 mg twice a day    2. Dyslipidemia; continue atorvastatin 40 mg daily    3. Essential hypertension; continue amlodipine and optimize lisinopril dose    4. Cognitive impairment, nonverbal, seizure disorder, depression/anxiety, GERD and other medical issues as per medicine      The case is discussed with nursing staff, medicine team and family at the bedside    Thank you Héctor Suarez MD  for allowing us to participate in the care of this patient       CC: John Santacruz MD Khalid Minhas, MD, [facility]

## 2025-04-26 LAB
ANION GAP SERPL CALCULATED.3IONS-SCNC: 8 MMOL/L (ref 9–16)
BUN SERPL-MCNC: 19 MG/DL (ref 8–23)
CALCIUM SERPL-MCNC: 8.8 MG/DL (ref 8.6–10.4)
CHLORIDE SERPL-SCNC: 108 MMOL/L (ref 98–107)
CO2 SERPL-SCNC: 22 MMOL/L (ref 20–31)
CREAT SERPL-MCNC: 0.6 MG/DL (ref 0.7–1.2)
GFR, ESTIMATED: >90 ML/MIN/1.73M2
GLUCOSE SERPL-MCNC: 115 MG/DL (ref 74–99)
MAGNESIUM SERPL-MCNC: 2.3 MG/DL (ref 1.6–2.4)
POTASSIUM SERPL-SCNC: 3.9 MMOL/L (ref 3.7–5.3)
SODIUM SERPL-SCNC: 138 MMOL/L (ref 136–145)
SURGICAL PATHOLOGY REPORT: NORMAL
TROPONIN I SERPL HS-MCNC: 12 NG/L (ref 0–22)

## 2025-04-26 PROCEDURE — 83735 ASSAY OF MAGNESIUM: CPT

## 2025-04-26 PROCEDURE — 2500000003 HC RX 250 WO HCPCS: Performed by: INTERNAL MEDICINE

## 2025-04-26 PROCEDURE — 6370000000 HC RX 637 (ALT 250 FOR IP)

## 2025-04-26 PROCEDURE — 99233 SBSQ HOSP IP/OBS HIGH 50: CPT | Performed by: PSYCHIATRY & NEUROLOGY

## 2025-04-26 PROCEDURE — 84484 ASSAY OF TROPONIN QUANT: CPT

## 2025-04-26 PROCEDURE — 2580000003 HC RX 258: Performed by: INTERNAL MEDICINE

## 2025-04-26 PROCEDURE — 99232 SBSQ HOSP IP/OBS MODERATE 35: CPT | Performed by: PSYCHIATRY & NEUROLOGY

## 2025-04-26 PROCEDURE — 93005 ELECTROCARDIOGRAM TRACING: CPT

## 2025-04-26 PROCEDURE — 6370000000 HC RX 637 (ALT 250 FOR IP): Performed by: INTERNAL MEDICINE

## 2025-04-26 PROCEDURE — 6370000000 HC RX 637 (ALT 250 FOR IP): Performed by: NURSE PRACTITIONER

## 2025-04-26 PROCEDURE — 36415 COLL VENOUS BLD VENIPUNCTURE: CPT

## 2025-04-26 PROCEDURE — 1200000000 HC SEMI PRIVATE

## 2025-04-26 PROCEDURE — 80048 BASIC METABOLIC PNL TOTAL CA: CPT

## 2025-04-26 PROCEDURE — 99232 SBSQ HOSP IP/OBS MODERATE 35: CPT | Performed by: INTERNAL MEDICINE

## 2025-04-26 RX ADMIN — LANSOPRAZOLE 30 MG: 15 TABLET, ORALLY DISINTEGRATING ORAL at 06:14

## 2025-04-26 RX ADMIN — ATORVASTATIN CALCIUM 40 MG: 40 TABLET, FILM COATED ORAL at 21:09

## 2025-04-26 RX ADMIN — MIRTAZAPINE 15 MG: 15 TABLET, FILM COATED ORAL at 21:09

## 2025-04-26 RX ADMIN — SODIUM CHLORIDE, PRESERVATIVE FREE 10 ML: 5 INJECTION INTRAVENOUS at 21:10

## 2025-04-26 RX ADMIN — APIXABAN 5 MG: 5 TABLET, FILM COATED ORAL at 21:08

## 2025-04-26 RX ADMIN — LISINOPRIL 40 MG: 20 TABLET ORAL at 21:08

## 2025-04-26 RX ADMIN — AMLODIPINE BESYLATE 10 MG: 10 TABLET ORAL at 09:08

## 2025-04-26 RX ADMIN — APIXABAN 5 MG: 5 TABLET, FILM COATED ORAL at 13:43

## 2025-04-26 RX ADMIN — SERTRALINE HYDROCHLORIDE 100 MG: 50 TABLET ORAL at 09:08

## 2025-04-26 RX ADMIN — ACETAMINOPHEN 650 MG: 325 TABLET ORAL at 21:09

## 2025-04-26 RX ADMIN — SODIUM CHLORIDE, PRESERVATIVE FREE 10 ML: 5 INJECTION INTRAVENOUS at 21:09

## 2025-04-26 RX ADMIN — SODIUM CHLORIDE: 0.9 INJECTION, SOLUTION INTRAVENOUS at 06:15

## 2025-04-26 ASSESSMENT — PAIN SCALES - GENERAL
PAINLEVEL_OUTOF10: 0

## 2025-04-26 ASSESSMENT — PAIN - FUNCTIONAL ASSESSMENT: PAIN_FUNCTIONAL_ASSESSMENT: PREVENTS OR INTERFERES SOME ACTIVE ACTIVITIES AND ADLS

## 2025-04-26 ASSESSMENT — PAIN DESCRIPTION - LOCATION: LOCATION: GENERALIZED

## 2025-04-27 ENCOUNTER — APPOINTMENT (OUTPATIENT)
Dept: CT IMAGING | Age: 70
DRG: 023 | End: 2025-04-27
Payer: MEDICARE

## 2025-04-27 LAB
EKG ATRIAL RATE: 52 BPM
EKG P AXIS: 34 DEGREES
EKG P-R INTERVAL: 254 MS
EKG Q-T INTERVAL: 458 MS
EKG QRS DURATION: 98 MS
EKG QTC CALCULATION (BAZETT): 425 MS
EKG R AXIS: 27 DEGREES
EKG T AXIS: 31 DEGREES
EKG VENTRICULAR RATE: 52 BPM

## 2025-04-27 PROCEDURE — 6370000000 HC RX 637 (ALT 250 FOR IP): Performed by: NURSE PRACTITIONER

## 2025-04-27 PROCEDURE — 6370000000 HC RX 637 (ALT 250 FOR IP)

## 2025-04-27 PROCEDURE — 97535 SELF CARE MNGMENT TRAINING: CPT

## 2025-04-27 PROCEDURE — 93010 ELECTROCARDIOGRAM REPORT: CPT | Performed by: INTERNAL MEDICINE

## 2025-04-27 PROCEDURE — 97110 THERAPEUTIC EXERCISES: CPT

## 2025-04-27 PROCEDURE — 97530 THERAPEUTIC ACTIVITIES: CPT

## 2025-04-27 PROCEDURE — 99232 SBSQ HOSP IP/OBS MODERATE 35: CPT | Performed by: PSYCHIATRY & NEUROLOGY

## 2025-04-27 PROCEDURE — 2500000003 HC RX 250 WO HCPCS: Performed by: INTERNAL MEDICINE

## 2025-04-27 PROCEDURE — 2580000003 HC RX 258: Performed by: INTERNAL MEDICINE

## 2025-04-27 PROCEDURE — 6370000000 HC RX 637 (ALT 250 FOR IP): Performed by: INTERNAL MEDICINE

## 2025-04-27 PROCEDURE — 1200000000 HC SEMI PRIVATE

## 2025-04-27 PROCEDURE — 70450 CT HEAD/BRAIN W/O DYE: CPT

## 2025-04-27 RX ADMIN — LANSOPRAZOLE 30 MG: 15 TABLET, ORALLY DISINTEGRATING ORAL at 05:57

## 2025-04-27 RX ADMIN — SERTRALINE HYDROCHLORIDE 100 MG: 50 TABLET ORAL at 08:40

## 2025-04-27 RX ADMIN — APIXABAN 5 MG: 5 TABLET, FILM COATED ORAL at 08:40

## 2025-04-27 RX ADMIN — ATORVASTATIN CALCIUM 40 MG: 40 TABLET, FILM COATED ORAL at 20:05

## 2025-04-27 RX ADMIN — APIXABAN 5 MG: 5 TABLET, FILM COATED ORAL at 20:04

## 2025-04-27 RX ADMIN — SODIUM CHLORIDE: 0.9 INJECTION, SOLUTION INTRAVENOUS at 20:26

## 2025-04-27 RX ADMIN — SODIUM CHLORIDE, PRESERVATIVE FREE 10 ML: 5 INJECTION INTRAVENOUS at 08:47

## 2025-04-27 RX ADMIN — SODIUM CHLORIDE: 0.9 INJECTION, SOLUTION INTRAVENOUS at 01:29

## 2025-04-27 RX ADMIN — SODIUM CHLORIDE, PRESERVATIVE FREE 10 ML: 5 INJECTION INTRAVENOUS at 20:07

## 2025-04-27 RX ADMIN — SODIUM CHLORIDE: 0.9 INJECTION, SOLUTION INTRAVENOUS at 08:31

## 2025-04-27 RX ADMIN — MIRTAZAPINE 15 MG: 15 TABLET, FILM COATED ORAL at 20:05

## 2025-04-27 RX ADMIN — AMLODIPINE BESYLATE 10 MG: 10 TABLET ORAL at 08:40

## 2025-04-28 PROCEDURE — 6370000000 HC RX 637 (ALT 250 FOR IP): Performed by: INTERNAL MEDICINE

## 2025-04-28 PROCEDURE — 6370000000 HC RX 637 (ALT 250 FOR IP): Performed by: NURSE PRACTITIONER

## 2025-04-28 PROCEDURE — 6360000002 HC RX W HCPCS: Performed by: INTERNAL MEDICINE

## 2025-04-28 PROCEDURE — 6370000000 HC RX 637 (ALT 250 FOR IP)

## 2025-04-28 PROCEDURE — 2500000003 HC RX 250 WO HCPCS: Performed by: INTERNAL MEDICINE

## 2025-04-28 PROCEDURE — 99233 SBSQ HOSP IP/OBS HIGH 50: CPT | Performed by: PSYCHIATRY & NEUROLOGY

## 2025-04-28 PROCEDURE — 1200000000 HC SEMI PRIVATE

## 2025-04-28 PROCEDURE — 99232 SBSQ HOSP IP/OBS MODERATE 35: CPT | Performed by: PSYCHIATRY & NEUROLOGY

## 2025-04-28 RX ADMIN — HYDRALAZINE HYDROCHLORIDE 10 MG: 20 INJECTION INTRAMUSCULAR; INTRAVENOUS at 17:30

## 2025-04-28 RX ADMIN — SERTRALINE HYDROCHLORIDE 100 MG: 50 TABLET ORAL at 08:21

## 2025-04-28 RX ADMIN — AMLODIPINE BESYLATE 10 MG: 10 TABLET ORAL at 08:21

## 2025-04-28 RX ADMIN — SODIUM CHLORIDE, PRESERVATIVE FREE 10 ML: 5 INJECTION INTRAVENOUS at 08:22

## 2025-04-28 RX ADMIN — MIRTAZAPINE 15 MG: 15 TABLET, FILM COATED ORAL at 22:40

## 2025-04-28 RX ADMIN — SODIUM CHLORIDE, PRESERVATIVE FREE 10 ML: 5 INJECTION INTRAVENOUS at 21:00

## 2025-04-28 RX ADMIN — LANSOPRAZOLE 30 MG: 15 TABLET, ORALLY DISINTEGRATING ORAL at 08:21

## 2025-04-28 RX ADMIN — APIXABAN 5 MG: 5 TABLET, FILM COATED ORAL at 08:23

## 2025-04-28 RX ADMIN — ATORVASTATIN CALCIUM 40 MG: 40 TABLET, FILM COATED ORAL at 22:39

## 2025-04-28 RX ADMIN — LISINOPRIL 40 MG: 20 TABLET ORAL at 22:39

## 2025-04-28 RX ADMIN — APIXABAN 5 MG: 5 TABLET, FILM COATED ORAL at 22:39

## 2025-04-28 ASSESSMENT — PAIN SCALES - GENERAL: PAINLEVEL_OUTOF10: 0

## 2025-04-29 PROCEDURE — 1200000000 HC SEMI PRIVATE

## 2025-04-29 PROCEDURE — 97535 SELF CARE MNGMENT TRAINING: CPT

## 2025-04-29 PROCEDURE — 97530 THERAPEUTIC ACTIVITIES: CPT

## 2025-04-29 PROCEDURE — 6370000000 HC RX 637 (ALT 250 FOR IP)

## 2025-04-29 PROCEDURE — 6370000000 HC RX 637 (ALT 250 FOR IP): Performed by: NURSE PRACTITIONER

## 2025-04-29 PROCEDURE — 6370000000 HC RX 637 (ALT 250 FOR IP): Performed by: INTERNAL MEDICINE

## 2025-04-29 PROCEDURE — 97110 THERAPEUTIC EXERCISES: CPT

## 2025-04-29 PROCEDURE — 99233 SBSQ HOSP IP/OBS HIGH 50: CPT | Performed by: PSYCHIATRY & NEUROLOGY

## 2025-04-29 PROCEDURE — 99232 SBSQ HOSP IP/OBS MODERATE 35: CPT | Performed by: PSYCHIATRY & NEUROLOGY

## 2025-04-29 PROCEDURE — 6360000002 HC RX W HCPCS

## 2025-04-29 PROCEDURE — 2580000003 HC RX 258: Performed by: INTERNAL MEDICINE

## 2025-04-29 PROCEDURE — 2500000003 HC RX 250 WO HCPCS: Performed by: INTERNAL MEDICINE

## 2025-04-29 RX ORDER — ENOXAPARIN SODIUM 100 MG/ML
30 INJECTION SUBCUTANEOUS DAILY
Status: DISCONTINUED | OUTPATIENT
Start: 2025-04-29 | End: 2025-04-29 | Stop reason: DRUGHIGH

## 2025-04-29 RX ORDER — ASPIRIN 81 MG/1
81 TABLET, CHEWABLE ORAL DAILY
Status: DISCONTINUED | OUTPATIENT
Start: 2025-04-29 | End: 2025-04-30 | Stop reason: HOSPADM

## 2025-04-29 RX ORDER — ENOXAPARIN SODIUM 100 MG/ML
40 INJECTION SUBCUTANEOUS NIGHTLY
Status: DISCONTINUED | OUTPATIENT
Start: 2025-04-29 | End: 2025-04-30 | Stop reason: HOSPADM

## 2025-04-29 RX ADMIN — SERTRALINE HYDROCHLORIDE 100 MG: 50 TABLET ORAL at 09:19

## 2025-04-29 RX ADMIN — SODIUM CHLORIDE, PRESERVATIVE FREE 10 ML: 5 INJECTION INTRAVENOUS at 09:19

## 2025-04-29 RX ADMIN — LISINOPRIL 40 MG: 20 TABLET ORAL at 22:26

## 2025-04-29 RX ADMIN — ENOXAPARIN SODIUM 40 MG: 100 INJECTION SUBCUTANEOUS at 22:19

## 2025-04-29 RX ADMIN — SODIUM CHLORIDE: 0.9 INJECTION, SOLUTION INTRAVENOUS at 22:18

## 2025-04-29 RX ADMIN — ATORVASTATIN CALCIUM 40 MG: 40 TABLET, FILM COATED ORAL at 22:20

## 2025-04-29 RX ADMIN — AMLODIPINE BESYLATE 10 MG: 10 TABLET ORAL at 09:18

## 2025-04-29 RX ADMIN — ASPIRIN 81 MG 81 MG: 81 TABLET ORAL at 14:08

## 2025-04-29 RX ADMIN — APIXABAN 5 MG: 5 TABLET, FILM COATED ORAL at 09:19

## 2025-04-29 RX ADMIN — SODIUM CHLORIDE, PRESERVATIVE FREE 10 ML: 5 INJECTION INTRAVENOUS at 21:00

## 2025-04-29 RX ADMIN — LANSOPRAZOLE 30 MG: 15 TABLET, ORALLY DISINTEGRATING ORAL at 09:18

## 2025-04-29 RX ADMIN — MIRTAZAPINE 15 MG: 15 TABLET, FILM COATED ORAL at 22:20

## 2025-04-29 ASSESSMENT — PAIN SCALES - GENERAL: PAINLEVEL_OUTOF10: 0

## 2025-04-30 VITALS
TEMPERATURE: 97.2 F | HEIGHT: 62 IN | OXYGEN SATURATION: 99 % | DIASTOLIC BLOOD PRESSURE: 47 MMHG | RESPIRATION RATE: 12 BRPM | WEIGHT: 123 LBS | HEART RATE: 48 BPM | BODY MASS INDEX: 22.63 KG/M2 | SYSTOLIC BLOOD PRESSURE: 118 MMHG

## 2025-04-30 PROCEDURE — 6370000000 HC RX 637 (ALT 250 FOR IP)

## 2025-04-30 PROCEDURE — 94761 N-INVAS EAR/PLS OXIMETRY MLT: CPT

## 2025-04-30 PROCEDURE — 6370000000 HC RX 637 (ALT 250 FOR IP): Performed by: NURSE PRACTITIONER

## 2025-04-30 PROCEDURE — 99232 SBSQ HOSP IP/OBS MODERATE 35: CPT | Performed by: PSYCHIATRY & NEUROLOGY

## 2025-04-30 PROCEDURE — 2500000003 HC RX 250 WO HCPCS: Performed by: INTERNAL MEDICINE

## 2025-04-30 PROCEDURE — 6370000000 HC RX 637 (ALT 250 FOR IP): Performed by: INTERNAL MEDICINE

## 2025-04-30 RX ORDER — LISINOPRIL 40 MG/1
40 TABLET ORAL NIGHTLY
Qty: 30 TABLET | Refills: 3 | Status: SHIPPED | OUTPATIENT
Start: 2025-04-30

## 2025-04-30 RX ORDER — ASPIRIN 81 MG/1
81 TABLET, CHEWABLE ORAL DAILY
Qty: 30 TABLET | Refills: 3 | Status: SHIPPED | OUTPATIENT
Start: 2025-05-01

## 2025-04-30 RX ORDER — ATORVASTATIN CALCIUM 40 MG/1
40 TABLET, FILM COATED ORAL NIGHTLY
Qty: 30 TABLET | Refills: 3 | Status: SHIPPED | OUTPATIENT
Start: 2025-04-30

## 2025-04-30 RX ADMIN — AMLODIPINE BESYLATE 10 MG: 10 TABLET ORAL at 09:19

## 2025-04-30 RX ADMIN — SODIUM CHLORIDE, PRESERVATIVE FREE 10 ML: 5 INJECTION INTRAVENOUS at 09:19

## 2025-04-30 RX ADMIN — LANSOPRAZOLE 30 MG: 15 TABLET, ORALLY DISINTEGRATING ORAL at 09:19

## 2025-04-30 RX ADMIN — ASPIRIN 81 MG 81 MG: 81 TABLET ORAL at 09:19

## 2025-04-30 RX ADMIN — SERTRALINE HYDROCHLORIDE 100 MG: 50 TABLET ORAL at 09:19

## 2025-04-30 NOTE — PROGRESS NOTES
Cardiology progress note        Date:  4/23/2025  Patient: Carrington Mulligan  Admission:  4/18/2025  1:12 PM  Admit DX: Acute ischemic left middle cerebral artery (MCA) stroke (HCC) [I63.512]  Cerebrovascular accident (CVA), unspecified mechanism (HCC) [I63.9]  Age:  69 y.o., 1955     LOS: 5 days     Reason for evaluation:   Acute stroke with interatrial septal defect      SUBJECTIVE:     The patient was seen and examined. Notes and labs reviewed.    Patient remains at his baseline and is hemodynamically stable    OBJECTIVE:      EXAM:   Vitals:    VITALS:  BP (!) 130/90   Pulse 90   Temp 98 °F (36.7 °C) (Temporal)   Resp 21   Ht 1.575 m (5' 2\")   Wt 58.7 kg (129 lb 6.6 oz)   SpO2 95%   BMI 23.67 kg/m²    24HR INTAKE/OUTPUT:    Intake/Output Summary (Last 24 hours) at 4/23/2025 1643  Last data filed at 4/23/2025 1153  Gross per 24 hour   Intake 2196.25 ml   Output 2425 ml   Net -228.75 ml       General appearance: awake, alert but nonverbal  HEENT: NG tube in place  Lungs: clear to auscultation bilaterally  Heart: regular rate and rhythm, S1, S2, 2/6 systolic murmur  Abdomen: soft, non-tender; bowel sounds active  Extremities: no significant lower extremity edema  Skin:  warm and dry    Current Inpatient Medications:   lisinopril  20 mg Per NG tube Daily    aspirin  81 mg Per NG tube Daily    atorvastatin  80 mg Per NG tube Nightly    sertraline  100 mg Per NG tube Daily    mirtazapine  15 mg Per NG tube Nightly    amLODIPine  10 mg Per NG tube Daily    [Held by provider] enoxaparin  40 mg SubCUTAneous Daily    sodium chloride flush  5-40 mL IntraVENous 2 times per day    sodium chloride flush  5-40 mL IntraVENous 2 times per day    sodium chloride flush  5-40 mL IntraVENous 2 times per day       IV Infusions (if any):   sodium chloride      sodium chloride      sodium chloride      sodium chloride 75 mL/hr at 04/23/25 1624       Diagnostics:   Telemetry: Sinus rhythm    EKG 4/18/2025  Sinus 
              Cardiology progress note        Date:  4/24/2025  Patient: Carrington Mulligan  Admission:  4/18/2025  1:12 PM  Admit DX: Acute ischemic left middle cerebral artery (MCA) stroke (HCC) [I63.512]  Cerebrovascular accident (CVA), unspecified mechanism (HCC) [I63.9]  Age:  69 y.o., 1955     LOS: 6 days     Reason for evaluation:   Acute stroke with interatrial septal defect       SUBJECTIVE:     The patient was seen and examined. Notes and labs reviewed.    Patient is scheduled for PEG tube placement today    OBJECTIVE:      EXAM:   Vitals:    VITALS:  BP (!) 127/94   Pulse 56   Temp 96.8 °F (36 °C) (Temporal)   Resp 14   Ht 1.575 m (5' 2.01\")   Wt 58.7 kg (129 lb 6.6 oz)   SpO2 98%   BMI 23.66 kg/m²    24HR INTAKE/OUTPUT:    Intake/Output Summary (Last 24 hours) at 4/24/2025 1332  Last data filed at 4/24/2025 1156  Gross per 24 hour   Intake 1812.62 ml   Output 1900 ml   Net -87.38 ml       General appearance: awake, alert but nonverbal  HEENT: NG tube in place  Lungs: clear to auscultation bilaterally  Heart: regular rate and rhythm, S1, S2, 2/6 systolic murmur  Abdomen: soft, non-tender; bowel sounds active  Extremities: no significant lower extremity edema  Skin:  warm and dry    Current Inpatient Medications:   [START ON 4/25/2025] amLODIPine  10 mg PEG Tube Daily    lisinopril  40 mg PEG Tube Nightly    aspirin  81 mg PEG Tube Daily    atorvastatin  40 mg PEG Tube Nightly    sertraline  100 mg Per NG tube Daily    mirtazapine  15 mg Per NG tube Nightly    sodium chloride flush  5-40 mL IntraVENous 2 times per day    sodium chloride flush  5-40 mL IntraVENous 2 times per day       IV Infusions (if any):   sodium chloride      sodium chloride      sodium chloride 75 mL/hr at 04/23/25 1624       Diagnostics:   Telemetry: Sinus rhythm     EKG 4/18/2025  Sinus rhythm 86 bpm, voltage criteria for LVH     Echocardiogram 4/21/2025    Normal left ventricular systolic function with EF 50 - 55%. EF by 
     [facility] Wound Ostomy  Nurse  Consult Note       NAME:  Carrington Mulligan  MEDICAL RECORD NUMBER:  [id number]  AGE: 69 y.o.   GENDER: male  : 1955  TODAY'S DATE:  2025    Subjective   Reason for [facility] Nurse Evaluation and Assessment: \"sacral\"      Carrington Mulligan is a 69 y.o. male referred by:   [x] Physician  [] Nursing  [] Other:     Wound Identification:  Wound Type: pressure  Contributing Factors: chronic pressure, decreased mobility, shear force, and incontinence of urine    Wound History:   From H&P:  \"69-year-old male with a history of autism, developmental delay (nonverbal), and seizures presented on 25 as a transfer from [facility] ED with acute left M1 MCA occlusion after being found with right-sided weakness... he underwent cerebral angiogram with mechanical thrombectomy .\"    Small partial thickness wound noted  at coccyx.        PAST MEDICAL HISTORY        Diagnosis Date    Allergic rhinitis     Autism     Epilepsy     GERD (gastroesophageal reflux disease)     Hypertriglyceridemia     Mental retardation     Occupational circumstances     [place] [employer]    Seizures (HCC)        PAST SURGICAL HISTORY    Past Surgical History:   Procedure Laterality Date    COLONOSCOPY      Polyps    FINGER NAIL SURGERY Right 2021    NAILBED EXCISION MATRIXECTOMY, HALLUX TOTAL AVULSION performed by Vikram Seo DPM at [facility] OR    OTHER SURGICAL HISTORY Right 2021    NAILBED EXCISION MATRIXECTOMY, HALLUX TOTAL AVULSION (Right )- Dr Seo     THROMBECTOMY  2025    LMCA           Objective    BP (!) 150/70   Pulse 64   Temp 97.9 °F (36.6 °C) (Axillary)   Resp 13   Ht 1.575 m (5' 2\")   Wt 58.7 kg (129 lb 6.6 oz)   SpO2 97%   BMI 23.67 kg/m²     LABS:  WBC:    Lab Results   Component Value Date/Time    WBC 10.1 2025 07:04 AM     H/H:    Lab Results   Component Value Date/Time    HGB 12.7 2025 07:04 AM    HCT 38.6 2025 07:04 AM           Assessment   Juan A Risk 
    Endovascular Neurosurgery Note      Reason for evaluation: L M1 occlusion     SUBJECTIVE:     NAEO from EVN perspective. Continues on Aspirin. CM on board for discharge planning. No new focal neuro deficits. Working with PT/OT.    HPI:     History of Chief Complaint:      Patient is a 70 YO m with PMH of autism and developmental delay. Nonverbal at baseline. Who presents as a transfer from [facility] ED for an acute L M1 occlusion.    Reportedly patient lives at nursing home and he is at baseline able to ambulate independently and does follow commands with people he is familiar with. Per reports patient today at AM was found down and when it was attempted to have patient standup it was noted that he was weak on his left upper and lower extremity. Moreover, left gaze deviation was noted.   Patient then was taken to OSH for concerns of AIS which revealed at least near occlusion of distal L M1. Sig motion on CTH WO which revealed however some degree of gray white matter loss of differentiation along the left MCA territory. Patient was loaded with 300 ASA rectally and then  transferred to [facility] after discussion with tele stroke, CTP analysis was with motion artifact however evidence of prolonged MTT was noted as detailed below. SBP upon arrival to ED was 164    Consent was obtained from legal guardian and plan for MT.      Allergies  has no known allergies.  Medications  Prior to Admission medications    Medication Sig Start Date End Date Taking? Authorizing Provider   aspirin 81 MG chewable tablet Take 1 tablet by mouth daily 5/1/25  Yes Eduardo Rodriguez MD   atorvastatin (LIPITOR) 40 MG tablet 1 tablet by PEG Tube route nightly 4/30/25  Yes Eduardo Rodriguez MD   lisinopril (PRINIVIL;ZESTRIL) 40 MG tablet 1 tablet by PEG Tube route at bedtime 4/30/25  Yes Eduardo Rodriguez MD   neomycin-bacitracin-polymyxin (NEOSPORIN) 3.5-400-5000 OINT ointment APPLY TO AFFECTED AREA(S) TOPICALLY 4 TIMES DAILY AS NEEDED 
    Endovascular Neurosurgery Note      Reason for evaluation: L M1 occlusion     SUBJECTIVE:     NAEO from EVN perspective. Plan for PEG today with GI team. No new focal  neuro deficits. No significant changes in exam.    HPI:     History of Chief Complaint:      Patient is a 68 YO m with PMH of autism and developmental delay. Nonverbal at baseline. Who presents as a transfer from [facility] ED for an acute L M1 occlusion.    Reportedly patient lives at nursing home and he is at baseline able to ambulate independently and does follow commands with people he is familiar with. Per reports patient today at AM was found down and when it was attempted to have patient standup it was noted that he was weak on his left upper and lower extremity. Moreover, left gaze deviation was noted.   Patient then was taken to OSH for concerns of AIS which revealed at least near occlusion of distal L M1. Sig motion on CTH WO which revealed however some degree of gray white matter loss of differentiation along the left MCA territory. Patient was loaded with 300 ASA rectally and then  transferred to [facility] after discussion with tele stroke, CTP analysis was with motion artifact however evidence of prolonged MTT was noted as detailed below. SBP upon arrival to ED was 164    Consent was obtained from legal guardian and plan for MT.      Allergies  has no known allergies.  Medications  Prior to Admission medications    Medication Sig Start Date End Date Taking? Authorizing Provider   neomycin-bacitracin-polymyxin (NEOSPORIN) 3.5-400-5000 OINT ointment APPLY TO AFFECTED AREA(S) TOPICALLY 4 TIMES DAILY AS NEEDED 4/17/25   John Santacruz MD   psyllium (REGULOID) 400 MG capsule Take 1 capsule by mouth in the morning and at bedtime    Provider, MD Tito   chlorhexidine (PERIDEX) 0.12 % solution (REQUEST REFILL WHEN NEEDED) USE ORALLY TO RINSE TWICE DAILY WITH COTTON SWAB 3/21/25   John Santacruz MD   sertraline (ZOLOFT) 100 MG 
    Endovascular Neurosurgery Note      Reason for evaluation: L M1 occlusion     SUBJECTIVE:     NAEO from EVN perspective. Reviewed rpt CT yesterday with evolving infarct and no hemorrhage. Continues on Eliquis per Cardiology. No significant changes in exam, with known poor baseline. S/p PEG.    HPI:     History of Chief Complaint:      Patient is a 70 YO m with PMH of autism and developmental delay. Nonverbal at baseline. Who presents as a transfer from [facility] ED for an acute L M1 occlusion.    Reportedly patient lives at nursing home and he is at baseline able to ambulate independently and does follow commands with people he is familiar with. Per reports patient today at AM was found down and when it was attempted to have patient standup it was noted that he was weak on his left upper and lower extremity. Moreover, left gaze deviation was noted.   Patient then was taken to OSH for concerns of AIS which revealed at least near occlusion of distal L M1. Sig motion on CTH WO which revealed however some degree of gray white matter loss of differentiation along the left MCA territory. Patient was loaded with 300 ASA rectally and then  transferred to [facility] after discussion with tele stroke, CTP analysis was with motion artifact however evidence of prolonged MTT was noted as detailed below. SBP upon arrival to ED was 164    Consent was obtained from legal guardian and plan for MT.      Allergies  has no known allergies.  Medications  Prior to Admission medications    Medication Sig Start Date End Date Taking? Authorizing Provider   neomycin-bacitracin-polymyxin (NEOSPORIN) 3.5-400-5000 OINT ointment APPLY TO AFFECTED AREA(S) TOPICALLY 4 TIMES DAILY AS NEEDED 4/17/25   John Santacruz MD   psyllium (REGULOID) 400 MG capsule Take 1 capsule by mouth in the morning and at bedtime    ProviderTito MD   chlorhexidine (PERIDEX) 0.12 % solution (REQUEST REFILL WHEN NEEDED) USE ORALLY TO RINSE TWICE DAILY 
    Endovascular Neurosurgery Note      Reason for evaluation: L M1 occlusion     SUBJECTIVE:     NAEO from EVN perspective. Some lethargy and slightly increased weakness in the RUE, prompting rpt head CT which is stable with no significant new changes.     HPI:     History of Chief Complaint:      Patient is a 68 YO m with PMH of autism and developmental delay. Nonverbal at baseline. Who presents as a transfer from [facility] ED for an acute L M1 occlusion.    Reportedly patient lives at nursing home and he is at baseline able to ambulate independently and does follow commands with people he is familiar with. Per reports patient today at AM was found down and when it was attempted to have patient standup it was noted that he was weak on his left upper and lower extremity. Moreover, left gaze deviation was noted.   Patient then was taken to OSH for concerns of AIS which revealed at least near occlusion of distal L M1. Sig motion on CTH WO which revealed however some degree of gray white matter loss of differentiation along the left MCA territory. Patient was loaded with 300 ASA rectally and then  transferred to [facility] after discussion with tele stroke, CTP analysis was with motion artifact however evidence of prolonged MTT was noted as detailed below. SBP upon arrival to ED was 164    Consent was obtained from legal guardian and plan for MT.      Allergies  has no known allergies.  Medications  Prior to Admission medications    Medication Sig Start Date End Date Taking? Authorizing Provider   neomycin-bacitracin-polymyxin (NEOSPORIN) 3.5-400-5000 OINT ointment APPLY TO AFFECTED AREA(S) TOPICALLY 4 TIMES DAILY AS NEEDED 4/17/25   John Santacruz MD   levoFLOXacin (LEVAQUIN) 500 MG tablet Take 1 tablet by mouth daily for 7 days 4/14/25 4/21/25  John Santacruz MD   psyllium (REGULOID) 400 MG capsule Take 1 capsule by mouth in the morning and at bedtime    Provider, MD Tito   chlorhexidine (PERIDEX) 
    Endovascular Neurosurgery Note      Reason for evaluation: L M1 occlusion     SUBJECTIVE:     NAEO from EVN perspective. Transferred to stepdown. Exam remains stable, with some slight improvements in RUE/RLE weakness but still not able to maintain antigravity for long.    HPI:     History of Chief Complaint:      Patient is a 70 YO m with PMH of autism and developmental delay. Nonverbal at baseline. Who presents as a transfer from [facility] ED for an acute L M1 occlusion.    Reportedly patient lives at nursing home and he is at baseline able to ambulate independently and does follow commands with people he is familiar with. Per reports patient today at AM was found down and when it was attempted to have patient standup it was noted that he was weak on his left upper and lower extremity. Moreover, left gaze deviation was noted.   Patient then was taken to OSH for concerns of AIS which revealed at least near occlusion of distal L M1. Sig motion on CTH WO which revealed however some degree of gray white matter loss of differentiation along the left MCA territory. Patient was loaded with 300 ASA rectally and then  transferred to [facility] after discussion with tele stroke, CTP analysis was with motion artifact however evidence of prolonged MTT was noted as detailed below. SBP upon arrival to ED was 164    Consent was obtained from legal guardian and plan for MT.      Allergies  has no known allergies.  Medications  Prior to Admission medications    Medication Sig Start Date End Date Taking? Authorizing Provider   neomycin-bacitracin-polymyxin (NEOSPORIN) 3.5-400-5000 OINT ointment APPLY TO AFFECTED AREA(S) TOPICALLY 4 TIMES DAILY AS NEEDED 4/17/25   John Santacruz MD   levoFLOXacin (LEVAQUIN) 500 MG tablet Take 1 tablet by mouth daily for 7 days 4/14/25 4/21/25  John Santacruz MD   psyllium (REGULOID) 400 MG capsule Take 1 capsule by mouth in the morning and at bedtime    Provider, MD Tito 
    Pharmacist Review and Automatic Dose Adjustment of Prophylactic Enoxaparin         The reviewing pharmacist has made an adjustment to the ordered enoxaparin dose or converted to UFH per the approved [facility] protocol and table as identified below.        Carrington Mulligan is a 70 y.o. male.     No results for input(s): \"CREATININE\" in the last 72 hours.    Estimated Creatinine Clearance: 86 mL/min (A) (based on SCr of 0.6 mg/dL (L)).    No results for input(s): \"HGB\", \"HCT\", \"PLT\" in the last 72 hours.  No results for input(s): \"INR\" in the last 72 hours.    Height:   Ht Readings from Last 1 Encounters:   04/24/25 1.575 m (5' 2.01\")     Weight:  Wt Readings from Last 1 Encounters:   04/25/25 55.8 kg (123 lb)               Plan: Based upon the patient's weight and renal function    Ordered: Enoxaparin 30mg SUBQ Daily    Changed/converted to    New Order: Enoxaparin 40mg SUBQ Daily    Will start today at 2100 as Eliquis 5 mg given at 9am today.       Thank you,  Rolando Del Rosario [facility]  4/29/2025, 12:35 PM    
    Pharmacist Review and Automatic Dose Adjustment of Prophylactic Enoxaparin    Reviewed reason(s) for admission/hospital problem list    The reviewing pharmacist has made an adjustment to the ordered enoxaparin dose or converted to UFH per the approved [facility] protocol and table as identified below.        Carrington Mulligan is a 69 y.o. male.     Recent Labs     04/23/25  0836 04/24/25  0410 04/25/25  0612   CREATININE 0.7 0.7 0.6*       Estimated Creatinine Clearance: 87 mL/min (A) (based on SCr of 0.6 mg/dL (L)).    Recent Labs     04/24/25  0410 04/25/25  0612   HGB 11.9* 12.6*   HCT 38.4* 37.4*    195     No results for input(s): \"INR\" in the last 72 hours.    Height:   Ht Readings from Last 1 Encounters:   04/24/25 1.575 m (5' 2.01\")     Weight:  Wt Readings from Last 1 Encounters:   04/25/25 55.8 kg (123 lb)               Plan: Based upon the patient's weight and renal function    Ordered: Enoxaparin 30mg SUBQ Daily    Changed/converted to    New Order: Enoxaparin 40mg SUBQ Daily      Thank you,  Maury Foster [facility]  4/25/2025, 3:21 PM    
    [facility]   Gastroenterology Progress Note    Carrington Mulligan is a 69 y.o. male patient.  Hospitalization Day:7      Chief consult reason:   PEG tube    Subjective:  Patient seen and examined.  No acute events overnight.  Patient had PEG tube placed yesterday.  Site clean dry and intact, bumper moves freely, no signs of bleeding drainage or odor noted.   Also identified was erosive gastropathy-biopsies were taken and pending    VITALS:  /67   Pulse 58   Temp 98.1 °F (36.7 °C) (Oral)   Resp 15   Ht 1.575 m (5' 2.01\")   Wt 55.8 kg (123 lb)   SpO2 98%   BMI 22.49 kg/m²   TEMPERATURE:  Current - Temp: 98.1 °F (36.7 °C); Max - Temp  Av.6 °F (36.4 °C)  Min: 96.8 °F (36 °C)  Max: 98.1 °F (36.7 °C)    Physical Assessment:  General appearance: Resting  Mental Status: MARIO  Lungs:  clear to auscultation bilaterally, normal effort  Heart:  regular rate and rhythm, no murmur  Abdomen:  soft, nontender, nondistended, normal bowel sounds, no masses, hepatomegaly, splenomegaly PEG site clean dry and intact, bumper moves freely, no signs of bleeding odor or drainage.   Extremities:  no edema, redness, tenderness in the calves  Skin:  no gross lesions, rashes, induration    Data Review:    Labs and Imaging:       CBC:  Recent Labs     25  0836 25  0410 25  0612   WBC 7.2 6.4 7.7   HGB 12.4* 11.9* 12.6*   MCV 88.8 94.1 87.4   RDW 14.3 13.9 13.9    189 195       ANEMIA STUDIES:  No results for input(s): \"TIBC\", \"FERRITIN\", \"ZERHSKPK33\", \"FOLATE\", \"OCCULTBLD\" in the last 72 hours.    Invalid input(s): \"LABIRON\"    BMP:  Recent Labs     25  0836 25  0410 25  0612    139 138   K 4.0 3.6* 3.7   * 107 107   CO2 22 23 21   BUN 18 17 17   CREATININE 0.7 0.7 0.6*   GLUCOSE 103* 90 90   CALCIUM 9.0 9.0 8.9   MG  --  2.2  --        LFTS:  No results for input(s): \"ALKPHOS\", \"ALT\", \"AST\", \"BILITOT\", \"BILIDIR\", \"LABALBU\" in the last 72 hours.    Other 
   Neuro-Critical Care Progress Note    Patient Name: Carrington Mulligan  Patient : 1955  Room/Bed: 0115/0115-01  Code Status: Full  Allergies: No Known Allergies    CHIEF COMPLAINT         HPI      History Obtained From: EMR     The patient is a 69 y.o. male with a history of autism/developmental delay (nonverbal) and seizures presented to [facility] ED on 25 as a  transfer from [facility] for an acute left M1 MCA occlusion.  Patient is reportedly nonverbal at baseline and resides at a group home.  He initially presented to outside hospital after being found on the ground this morning with right-sided weakness.  LKW  around 2200 before bed.  Initial .  Evaluated by Dr. Ott via telestroke.  NIHSS 8 for new right hemiparesis.  CT head without contrast showed possible developing infarct in the left MCA territory.  CTA head/neck with contrast showed severe stenosis versus occlusion with distal M1 portion of left MCA extending into the M2 branch.  Given aspirin 300mg rectally.  Transferred to [facility] ED for neuroendovascular evaluation.  Stroke consult called on arrival.  NIHSS 23; nonverbal at baseline, left gaze deviation, no blink to threat on the right, right facial droop, right upper and lower extremity weakness.  .  Stat CT brain perfusion showed small to moderate size penumbra in the left frontal lobe.  Case discussed with neuroendovascular; plan for cerebral angiogram with mechanical thrombectomy.     Last 24 hours:   Seen and examined.  Examination improved, patient does not have a gaze anymore but does have contractures of the right side.  Had naturally maintained his systolic blood pressure under 140.  CT head shows some petechial looking hemorrhages but otherwise does not show any major bleed, will start aspirin 81 mg this morning.  Pending MRI brain without contrast.    Exam this morning: Left gaze preference but able to cross midline, left upper extremity and lower 
  Physician Progress Note      PATIENT:               MITCHELL ERICKSON  CSN #:                  [id number]  :                       1955  ADMIT DATE:       2025 1:12 PM  DISCH DATE:  RESPONDING  PROVIDER #:        Liseth Parker DO          QUERY TEXT:    Based on your medical judgment, please clarify these findings and document if   any of the following are being evaluated and/or treated:    The clinical indicators include:  - Patient admitted with Acute CVA    - CT HEAD-  - Associated mass effect from cytotoxic edema results in left   to right subfalcine herniation of up to 0.3 cm. No associated uncal nor   tonsillar herniation.  - CT HEAD-  - Evolving left MCA territory infarct with associated edema   and minimally increased rightward midline shift measuring 4 mm, previously 3   mm.  No evidence of hemorrhagic conversion    - monitor imagines and progression of CVA.  - PT/OT.  - failed swallow studies and plan for EPG tube.  Options provided:  -- Cerebral edema  -- CT HEAD findings are not clinically significant  -- Other - I will add my own diagnosis  -- Disagree - Not applicable / Not valid  -- Disagree - Clinically unable to determine / Unknown  -- Refer to Clinical Documentation Reviewer    PROVIDER RESPONSE TEXT:    This patient has Cerebral edema    Query created by: Rick Montaño on 2025 9:52 AM      Electronically signed by:  Liseth Parker DO 2025 9:55 AM          
Attempted bedside swallow study multiple times. Pt refuses everything by mouth.   
Comprehensive Nutrition Assessment    Type and Reason for Visit:  Initial, Positive nutrition screen    Nutrition Recommendations/Plan:   When able, recommend to start bolus feedings via PEG.    Recommend to start standard with fiber (Jevity 1.5), 100 ml bolus four times daily.  Increase volume by 50 ml every other feeding until goal volume of 250 ml four times daily.  Add 1 protein modular daily via PEG.  Flush with 100 ml water before and after each feeding.  At goal volume, provides total of 1572 kcal, 82 g protein, 1560 ml fluid daily.     Malnutrition Assessment:  Malnutrition Status:  Severe malnutrition (04/24/25 0917)    Context:  Acute Illness     Findings of the 6 clinical characteristics of malnutrition:  Energy Intake:  50% or less of estimated energy requirements for 5 or more days  Weight Loss:  Greater than 2% over 1 week     Body Fat Loss:  No body fat loss     Muscle Mass Loss:  No muscle mass loss    Fluid Accumulation:  No fluid accumulation     Strength:  Not Performed    Nutrition Assessment:    Pt admitted with CVA and Hx GERD and seizures, currently NPO per SLP recommendation.  Pt was previously on dysphagia Minced and Moist diet per outside hospital RD records, with poor meal intakes though good intake of ONS.  Pt has been NPO this admission, making this Day #6 of NPO, though he does have NGT in place and was briefly tolerating standard with fiber TF (Jevity 1.5) at 20 ml/hr prior to being made NPO for PEG placement.  Attempted to speak with patient though he did not speak, and previous RD notes also show Pt is non-verbal.  Weight records show Pt did have some weight loss between August 2024 and February 2025, but has since gained some weight back.  Pt also noted to have pressure injury to coccyx per nursing documentation.  Pt may benefit from bolus feedings once PEG in place.    Nutrition Related Findings:    Meds/Labs reviewed.  Meds include Remeron 15 mg.  No edema.  No BM documented 
Comprehensive Nutrition Assessment    Type and Reason for Visit:  Reassess    Nutrition Recommendations/Plan:   Standard with Fiber at 40ml/hr (1440kcals, 61g protein, 730ml of water).   Water flush of 30ml q4hr or per medical team.   Monitor EN tolerance, weight, labs.      Malnutrition Assessment:  Malnutrition Status:  Severe malnutrition (04/24/25 0917)    Context:  Acute Illness     Findings of the 6 clinical characteristics of malnutrition:  Energy Intake:  50% or less of estimated energy requirements for 5 or more days  Weight Loss:  Greater than 2% over 1 week     Body Fat Loss:  No body fat loss     Muscle Mass Loss:  No muscle mass loss    Fluid Accumulation:  No fluid accumulation     Strength:  Not Performed    Nutrition Assessment:    Follow up. Patient remains NPO. +PEG (4/24). Standard with fiber in place at 40ml/hr, TF at goal. Spoke with RN, no reports of EN intolerance. Meds/labs reviewed.    Nutrition Related Findings:    Last BM 4/27. Wound Type: Pressure Injury, Stage II       Current Nutrition Intake & Therapies:    Average Meal Intake: NPO  Average Supplements Intake: NPO  Current Tube Feeding (TF) Orders:  Feeding Route: PEG  Formula: Standard without Fiber  Schedule: Continuous  Feeding Regimen: 40ml/hr (960ml/day)  Additives/Modulars: None  Water Flushes: 30ml q4hr  Current TF Provides: 1440kcals, 61g protein, 730ml of water + FWF    Anthropometric Measures:  Height: 157.5 cm (5' 2.01\")  Ideal Body Weight (IBW): 118 lbs (54 kg)    Admission Body Weight: 60.3 kg (132 lb 15 oz)  Current Body Weight: 55.8 kg (123 lb 0.3 oz), 109.7 % IBW. Weight Source: Bed scale  Current BMI (kg/m2): 22.5  Usual Body Weight: 50.3 kg (110 lb 14.3 oz) (February 2025)  % Weight Change (Calculated): 16.7  Weight Adjustment For: No Adjustment  BMI Categories: Normal Weight (BMI 18.5-24.9)    Estimated Daily Nutrient Needs:  Energy Requirements Based On: Kcal/kg  Weight Used for Energy Requirements: 
[facility] Neurology   IN-PATIENT SERVICE   [facility]    Progress Note             Date:   4/26/2025  Patient name:  Carrington Mulligan  Date of admission:  4/18/2025  1:12 PM  MRN:   [id number]  Account:  [id number]  YOB: 1955  PCP:    John Santacruz MD  Room:   [address] [place] [address]  Code Status:    Full Code    Chief Complaint:     Chief Complaint   Patient presents with    Cerebrovascular Accident       Interval hx:     The patient was seen and examined at bedside.   Alert and awake today morning  Multiple sinus pauses noted overnight.  EKG showed sinus bradycardia with first-degree heart block.  Troponin, electrolytes and magnesium ordered.  Will start the patient on Eliquis and discontinue aspirin.  Will repeat CT head tomorrow.  Accepted to [facility] in [place].  Will work on discharge planning tomorrow after repeat CT head.      Brief History of Present Illness:     69-year-old male with a history of autism, developmental delay (nonverbal), and seizures presented on 4/18/25 as a transfer from [place] ED with acute left M1 MCA occlusion after being found with right-sided weakness; last known well was 4/17 at 2200. Initial NIHSS was 8, rising to 23 on arrival at [facility], with right hemiparesis, gaze deviation, and right visual field cut. Imaging showed developing left MCA infarct and penumbra; he underwent cerebral angiogram with mechanical thrombectomy. Over the last 24 hours, exam improved with resolution of gaze deviation, but right-sided contractures persist. CT head showed petechial hemorrhages without major bleed; aspirin 81?mg started. NG tube placed after failed swallow study; tube feeds initiated. BP labile overnight, managed with PRNs; Norvasc increased to 10?mg. DVT prophylaxis with Lovenox started. Labs notable for K+ 3.3 (replaced), stable Hgb at 11.3. Patient remains nonverbal, which is baseline. MRI brain shows around 30-40% stroke in LMCA territory, 
Dr. Ott and Keshawn @ bedside, assessment done, no new PACU orders @ this time.  
Facility/Department: [address] STEPDOWN   Physical Therapy Initial Evaluation    Patient Name: Carrington Mulligan        MRN: [id number]    : 1955    Date of Service: 2025    Chief Complaint   Patient presents with    Cerebrovascular Accident   The patient is a 69 y.o. male with a history of autism/developmental delay (nonverbal) and seizures presented to [facility] ED on 25 as a  transfer from [facility] for an acute left M1 MCA occlusion.  Patient is reportedly nonverbal at baseline and resides at a group home.  He initially presented to outside hospital after being found on the ground this morning with right-sided weakness.  LKW  around 2200 before bed.  Initial .  Evaluated by Dr. Ott via telestroke.  NIHSS 8 for new right hemiparesis.  CT head without contrast showed possible developing infarct in the left MCA territory.  CTA head/neck with contrast showed severe stenosis versus occlusion with distal M1 portion of left MCA extending into the M2 branch.  Given aspirin 300mg rectally.  Transferred to [facility] ED for neuroendovascular evaluation.  Stroke consult called on arrival.  NIHSS 23; nonverbal at baseline, left gaze deviation, no blink to threat on the right, right facial droop, right upper and lower extremity weakness.  .  Stat CT brain perfusion showed small to moderate size penumbra in the left frontal lobe.  Case discussed with neuroendovascular; plan for cerebral angiogram with mechanical thrombectomy, done 25    Past Medical History:  has a past medical history of Allergic rhinitis, Autism, Epilepsy, GERD (gastroesophageal reflux disease), Hypertriglyceridemia, Mental retardation, Occupational circumstances, and Seizures ([facility]).  Past Surgical History:  has a past surgical history that includes Colonoscopy (); other surgical history (Right, 2021); Finger nail surgery (Right, 2021); and thrombectomy (2025).    Discharge 
Facility/Department: [address] NEURO ICU   CLINICAL BEDSIDE SWALLOW EVALUATION    NAME: Carrington Mulligan  : 1955  MRN: [id number]    ADMISSION DATE: 2025    ADMITTING DIAGNOSIS: has Autistic spectrum disorder; OCD (obsessive compulsive disorder); Generalized nonconvulsive epilepsy (HCC); Urinary incontinence; Gastroesophageal reflux disease; Allergic rhinitis with postnasal drip cough; History of colon polyps; Stereotypy; UTI (urinary tract infection); Delirium; Acute ischemic left middle cerebral artery (MCA) stroke (HCC); Global aphasia; Spastic hemiparesis of right dominant side due to acute cerebrovascular disease (HCC); and Cerebrovascular accident (CVA) (HCC) on their problem list.    ONSET DATE: 2025    Recent Chest Xray/CT of Chest: 2025  Low lung volumes. No acute cardiopulmonary process         Date of Eval: 2025  Evaluating Therapist: Liza Remy, SLP    Current Diet level:   NPO      Primary Complaint   Carrington Mulligan is a 70 yo male with nonverbal Autism and MRDD admitted 25 with AMS.    Caregivers report pt becoming less alert and interactive X several weeks.     Pt transferred to [facility] 25 for new left distal M1 MCA CVA s/p thrombectomy (TICI 3)          Clinical Dysphagia Assessment   Attempted trials of thin via straw and cup as well as apple juice via cup or straw  Pt pushed SLP away; refused trials          IMPRESSIONS  Unable to determine pt's ability to safely swallow d/t his refusals    Pt also refused RN trials as evidenced by pushing away        RECOMMENDATIONS  NPO with alternative nutrition/hydration    Continue to try RN swallow screen    Determine diet prior to hospitalization    Re-consult ST when pt is willing to eat/drink          Pain:  Pain Assessment  Pain Assessment: Adult Nonverbal Pain Scale (NPVS)    Reason for Referral  Carrington Mulligan was referred for a bedside swallow evaluation to assess the efficiency of his swallow function, 
[facility] Neurology   IN-PATIENT SERVICE   [facility]    Progress Note             Date:   4/30/2025  Patient name:  Carrington Mulligan  Date of admission:  4/18/2025  1:12 PM  MRN:   [id number]  Account:  [id number]  YOB: 1955  PCP:    John Santacruz MD  Room:   [address] [place] [address]  Code Status:    Full Code    Chief Complaint:     Chief Complaint   Patient presents with    Cerebrovascular Accident       Interval hx:     The patient was seen and examined at bedside.   Alert and awake today morning  Neuro exam: Stable  DC to [facility] today  Patient will require modified barium swallow outpatient to see if he able to tolerate diet by mouth again      Brief History of Present Illness:     Per H&P  69-year-old male with a history of autism, developmental delay (nonverbal), and seizures presented on 4/18/25 as a transfer from [facility] with acute left M1 MCA occlusion after being found with right-sided weakness; last known well was 4/17 at 2200. Initial NIHSS was 8, rising to 23 on arrival at [facility], with right hemiparesis, gaze deviation, and right visual field cut. Imaging showed developing left MCA infarct and penumbra; he underwent cerebral angiogram with mechanical thrombectomy. Over the last 24 hours, exam improved with resolution of gaze deviation, but right-sided contractures persist. CT head showed petechial hemorrhages without major bleed; aspirin 81?mg started. NG tube placed after failed swallow study; tube feeds initiated. BP labile overnight, managed with PRNs; Norvasc increased to 10?mg. DVT prophylaxis with Lovenox started. Labs notable for K+ 3.3 (replaced), stable Hgb at 11.3. Patient remains nonverbal, which is baseline. MRI brain shows around 30-40% stroke in LMCA territory, with no SWI changes     4/26: Multiple sinus pauses noted overnight.  EKG showed sinus bradycardia with first-degree heart block.  Troponin, electrolytes and magnesium ordered.  Will 
[facility] Neurology   IN-PATIENT SERVICE   [facility]    Progress Note             Date:   4/23/2025  Patient name:  Carrington Mulligan  Date of admission:  4/18/2025  1:12 PM  MRN:   [id number]  Account:  [id number]  YOB: 1955  PCP:    John Santacruz MD  Room:   [address] [place] [address]  Code Status:    Full Code    Chief Complaint:     Chief Complaint   Patient presents with    Cerebrovascular Accident       Interval hx:     The patient was seen and examined at bedside.   Alert and awake today morning  Pending cardiology recommendation for positive bubble study with right-to-left shunt  No DVT on bilateral lower extremities but has bilateral superficial cephalic vein thrombosis on upper extremities.  Reached out to  legal guardian about possible PEG tube.  They want to proceed with PEG tube.  GI consulted.      Brief History of Present Illness:     69-year-old male with a history of autism, developmental delay (nonverbal), and seizures presented on 4/18/25 as a transfer from [place] ED with acute left M1 MCA occlusion after being found with right-sided weakness; last known well was 4/17 at 2200. Initial NIHSS was 8, rising to 23 on arrival at [facility], with right hemiparesis, gaze deviation, and right visual field cut. Imaging showed developing left MCA infarct and penumbra; he underwent cerebral angiogram with mechanical thrombectomy. Over the last 24 hours, exam improved with resolution of gaze deviation, but right-sided contractures persist. CT head showed petechial hemorrhages without major bleed; aspirin 81?mg started. NG tube placed after failed swallow study; tube feeds initiated. BP labile overnight, managed with PRNs; Norvasc increased to 10?mg. DVT prophylaxis with Lovenox started. Labs notable for K+ 3.3 (replaced), stable Hgb at 11.3. Patient remains nonverbal, which is baseline. MRI brain shows around 30-40% stroke in LMCA territory, with no SWI changes     Past Medical 
[facility] Neurology   IN-PATIENT SERVICE   [facility]    Progress Note             Date:   4/27/2025  Patient name:  Carrington Mulligan  Date of admission:  4/18/2025  1:12 PM  MRN:   [id number]  Account:  [id number]  YOB: 1955  PCP:    John Santacruz MD  Room:   [address] [place] [address]  Code Status:    Full Code    Chief Complaint:     Chief Complaint   Patient presents with    Cerebrovascular Accident       Interval hx:     The patient was seen and examined at bedside.   Alert and awake today morning    Nonverbal  CT head this morning pending report, question for some petechial hyperintensities, possibly symptomatic hemorrhagic transformation, no change in NIH  Consider Eliquis for 10 days     Brief History of Present Illness:     Per H&P  69-year-old male with a history of autism, developmental delay (nonverbal), and seizures presented on 4/18/25 as a transfer from [facility] with acute left M1 MCA occlusion after being found with right-sided weakness; last known well was 4/17 at 2200. Initial NIHSS was 8, rising to 23 on arrival at [facility], with right hemiparesis, gaze deviation, and right visual field cut. Imaging showed developing left MCA infarct and penumbra; he underwent cerebral angiogram with mechanical thrombectomy. Over the last 24 hours, exam improved with resolution of gaze deviation, but right-sided contractures persist. CT head showed petechial hemorrhages without major bleed; aspirin 81?mg started. NG tube placed after failed swallow study; tube feeds initiated. BP labile overnight, managed with PRNs; Norvasc increased to 10?mg. DVT prophylaxis with Lovenox started. Labs notable for K+ 3.3 (replaced), stable Hgb at 11.3. Patient remains nonverbal, which is baseline. MRI brain shows around 30-40% stroke in LMCA territory, with no SWI changes     4/26: Multiple sinus pauses noted overnight.  EKG showed sinus bradycardia with first-degree heart block.  Troponin, 
[facility] Neurology   IN-PATIENT SERVICE   [facility]    Progress Note             Date:   4/28/2025  Patient name:  Carrington Mulligan  Date of admission:  4/18/2025  1:12 PM  MRN:   [id number]  Account:  [id number]  YOB: 1955  PCP:    John Santacruz MD  Room:   [address] [place] [address]  Code Status:    Full Code    Chief Complaint:     Chief Complaint   Patient presents with    Cerebrovascular Accident       Interval hx:     The patient was seen and examined at bedside.   Alert and awake today morning    Nonverbal  Reached out to neuroendovascular and cardiology whether the patient needs to be on Eliquis for Rt to left shunt  Awaiting placement    Brief History of Present Illness:     Per H&P  69-year-old male with a history of autism, developmental delay (nonverbal), and seizures presented on 4/18/25 as a transfer from [facility] with acute left M1 MCA occlusion after being found with right-sided weakness; last known well was 4/17 at 2200. Initial NIHSS was 8, rising to 23 on arrival at [facility], with right hemiparesis, gaze deviation, and right visual field cut. Imaging showed developing left MCA infarct and penumbra; he underwent cerebral angiogram with mechanical thrombectomy. Over the last 24 hours, exam improved with resolution of gaze deviation, but right-sided contractures persist. CT head showed petechial hemorrhages without major bleed; aspirin 81?mg started. NG tube placed after failed swallow study; tube feeds initiated. BP labile overnight, managed with PRNs; Norvasc increased to 10?mg. DVT prophylaxis with Lovenox started. Labs notable for K+ 3.3 (replaced), stable Hgb at 11.3. Patient remains nonverbal, which is baseline. MRI brain shows around 30-40% stroke in LMCA territory, with no SWI changes     4/26: Multiple sinus pauses noted overnight.  EKG showed sinus bradycardia with first-degree heart block.  Troponin, electrolytes and magnesium ordered.  Will start the 
Occupational Therapy    [facility]  Occupational Therapy Not Seen Note    DATE: 2025    NAME: Carrington Mulligan  MRN: [id number]   : 1955      Patient not seen this date for Occupational Therapy due to:    Surgery/Procedure: Peg tube placement.    Next Scheduled Treatment:     Electronically signed by JOSSY Ness on 2025 at 11:24 AM   
Occupational Therapy  Occupational Therapy Daily Treatment Note  Facility/Department: [address] NEURO ICU   Patient Name: Carrington Mulligan        MRN: [id number]    : 1955    Date of Service: 2025    Chief Complaint   Patient presents with    Cerebrovascular Accident     Past Medical History:  has a past medical history of Allergic rhinitis, Autism, Epilepsy, GERD (gastroesophageal reflux disease), Hypertriglyceridemia, Mental retardation, Occupational circumstances, and Seizures ([facility]).  Past Surgical History:  has a past surgical history that includes Colonoscopy (); other surgical history (Right, 2021); Finger nail surgery (Right, 2021); and thrombectomy (2025).    Discharge Recommendations  Discharge Recommendations: Continue to assess pending progress, Patient would benefit from continued therapy after discharge       Assessment  Performance deficits / Impairments: Decreased functional mobility ;Decreased ADL status;Decreased ROM;Decreased strength;Decreased safe awareness;Decreased endurance;Decreased balance;Decreased coordination;Decreased cognition;Decreased fine motor control  Prognosis: Good  Activity Tolerance  Activity Tolerance: Patient Tolerated treatment well;Treatment limited secondary to decreased cognition  Safety Devices  Type of Devices: Gait belt;Heels elevated for pressure relief;Patient at risk for falls;Nurse notified;Call light within reach;Left in bed;Bed alarm in place  Restraints  Restraints Initially in Place: No    AM-PAC  AM-PAC Daily Activity - Inpatient   How much help is needed for putting on and taking off regular lower body clothing?: A Lot  How much help is needed for bathing (which includes washing, rinsing, drying)?: A Lot  How much help is needed for toileting (which includes using toilet, bedpan, or urinal)?: A Lot  How much help is needed for putting on and taking off regular upper body clothing?: A Lot  How much help is needed for taking care 
Occupational Therapy  Occupational Therapy Daily Treatment Note  Facility/Department: [address] STEPDOWN       Patient Name: Carrington Mulligan        MRN: [id number]    : 1955    Date of Service: 2025      Past Medical History:  has a past medical history of Allergic rhinitis, Autism, Epilepsy (HCC), GERD (gastroesophageal reflux disease), Hypertriglyceridemia, Mental retardation, Occupational circumstances, and Seizures ([facility]).  Past Surgical History:  has a past surgical history that includes Colonoscopy (); other surgical history (Right, 2021); Finger nail surgery (Right, 2021); thrombectomy (2025); Upper gastrointestinal endoscopy (N/A, 2025); and Upper gastrointestinal endoscopy (2025).    Discharge Recommendations  Discharge Recommendations: Patient would benefit from continued therapy after discharge       Assessment  Performance deficits / Impairments: Decreased functional mobility ;Decreased ADL status;Decreased ROM;Decreased strength;Decreased safe awareness;Decreased endurance;Decreased balance;Decreased coordination;Decreased cognition;Decreased fine motor control  Assessment: Pt required significant assist with ADL as noted below and transfers. Patient would benefit from continued therapy during stay and after discharge from acute setting.  Demonstrates need for 24/ skilled assistance to safely perform personal ADLs and mobility.  Prognosis: Fair  Activity Tolerance  Activity Tolerance: Patient Tolerated treatment well;Treatment limited secondary to decreased cognition  Safety Devices  Type of Devices: Left in chair;Chair alarm in place;Heels elevated for pressure relief;Gait belt;Nurse notified;Patient at risk for falls;All fall risk precautions in place  Restraints  Restraints Initially in Place: No    AM-PAC  AM-PAC Daily Activity - Inpatient   How much help is needed for putting on and taking off regular lower body clothing?: Total  How much help is needed for 
Occupational Therapy  Occupational Therapy Daily Treatment Note  Facility/Department: [facility] 1C STEPDOWN     \  Patient Name: Carrington Mulligan        MRN: [id number]    : 1955    Date of Service: 2025      Past Medical History:  has a past medical history of Allergic rhinitis, Autism, Epilepsy ([facility]), GERD (gastroesophageal reflux disease), Hypertriglyceridemia, Mental retardation, Occupational circumstances, and Seizures ([facility]).  Past Surgical History:  has a past surgical history that includes Colonoscopy (); other surgical history (Right, 2021); Finger nail surgery (Right, 2021); thrombectomy (2025); Upper gastrointestinal endoscopy (N/A, 2025); and Upper gastrointestinal endoscopy (2025).    Discharge Recommendations  Discharge Recommendations: Patient would benefit from continued therapy after discharge       Assessment  Performance deficits / Impairments: Decreased functional mobility ;Decreased ADL status;Decreased ROM;Decreased strength;Decreased safe awareness;Decreased endurance;Decreased balance;Decreased coordination;Decreased cognition;Decreased fine motor control  Assessment: Pt required significant assist with ADL as noted below and yin. Patient would benefit from continued therapy during stay and after discharge from acute setting.  Demonstrates need for 24/ skilled assistance to safely perform personal ADLs and mobility.  Prognosis: Fair  Activity Tolerance  Activity Tolerance: Patient Tolerated treatment well;Treatment limited secondary to decreased cognition;Patient limited by fatigue  Safety Devices  Type of Devices: Call light within reach;Gait belt;Heels elevated for pressure relief;Patient at risk for falls;Nurse notified;Left in bed;Bed alarm in place  Restraints  Restraints Initially in Place: No    AM-PAC  AM-PAC Daily Activity - Inpatient   How much help is needed for putting on and taking off regular lower body clothing?: Total  How much help 
Patient SBP out of parameter continuously. PRN's given (See MAR). Patria Cr MD aware.       
Patient evaluated once return from neuroendovascular lab.  Patient is awake and alert continues to have left gaze deviation without crossing midline.  Nonverbal which is patient's baseline does respond to following commands by giving a thumbs up on the left upper extremity again wiggling bilateral toes right upper extremity antigravity does have intact sensation as evidenced by grimace with painful stimuli. Right groin access site soft with no hematoma, palpable pedal pulses    NIH Stroke Scale Total (if not done complete detailed one below):    1a.  Level of consciousness:  0 - alert; keenly responsive  1b.  Level of consciousness questions:  2 - answers neither question correctly  1c.  Level of consciousness questions:  0 - performs both tasks correctly  2.    Best Gaze:  2 - forced deviation, or total gaze paresis not overcome by oculocephalic maneuver  3.    Visual:  2 - complete hemianopia  4.    Facial Palsy:  1 - minor paralysis (flattened nasolabial fold, asymmetric on smiling)  5a.  Motor left arm:  1 - drift, limb holds 90 (or 45) degrees but drifts down before full 10 seconds: does not hit bed  5b.  Motor right arm:  2 - some effort against gravity, limb cannot get to or maintain (if cued) 90 (or 45) degrees, drifts down to bed, but has some effort against gravity   6a.  Motor left le - drift; leg falls by the end of the 5 second period but does not hit bed  6b.  Motor right leg:  3 - no effort against gravity; leg falls to bed immediately  7.    Limb Ataxia:  0 - absent  8.    Sensory:  0 - normal; no sensory loss  9.    Best Language:  3 - mute, global aphasia; no usable speech or auditory comprehension  10.  Dysarthria:  1 - mild to moderate, patient slurs at least some words and at worst, can be understood with some difficulty  11.  Extinction and Inattention:  1 - visual, tactile, auditory, spatial or personal inattention or extinction to bilateral simultaneous stimulation in one of the sensory 
Physical Therapy        Physical Therapy Cancel Note      DATE: 2025    NAME: Carrington Mulligan  MRN: [id number]   : 1955      Patient not seen this date for Physical Therapy due to:    Surgery/Procedure: ESOPHAGOGASTRODUODENOSCOPY PERCUTANEOUS ENDOSCOPIC GASTROSTOMY TUBE INSERTION    Plan to check back as able.      Electronically signed by AI ESTRELLA PTA on 2025 at 11:16 AM     
Physical Therapy        Physical Therapy Cancel Note      DATE: 2025    NAME: Carrington Mulligan  MRN: [id number]   : 1955      Patient not seen this date for Physical Therapy due to:    Patient is not appropriate for PT evaluation/treatment at this time d/t Pt not waking up despite verbal and tactile cues. Pt did eventually open eyes slightly then fell back asleep will check back tomorrow, 25.      Electronically signed by River Kemp PTA on 2025 at 2:07 PM      
Physical Therapy  Facility/Department: [address] STEPDOWN   Physical Therapy Daily Treatment Note    Patient Name: Carrington Mulligan        MRN: [id number]    : 1955    Date of Service: 2025    Chief Complaint   Patient presents with    Cerebrovascular Accident     Past Medical History:  has a past medical history of Allergic rhinitis, Autism, Epilepsy (HCC), GERD (gastroesophageal reflux disease), Hypertriglyceridemia, Mental retardation, Occupational circumstances, and Seizures (HCC).  Past Surgical History:  has a past surgical history that includes Colonoscopy (); other surgical history (Right, 2021); Finger nail surgery (Right, 2021); thrombectomy (2025); Upper gastrointestinal endoscopy (N/A, 2025); and Upper gastrointestinal endoscopy (2025).    Discharge Recommendations  Discharge Recommendations: Patient would benefit from continued therapy after discharge  PT Equipment Recommendations  Equipment Needed: No  Other: Pt requires extensive assistance to mobilize at this time.    Assessment  Body Structures, Functions, Activity Limitations Requiring Skilled Therapeutic Intervention: Decreased functional mobility ;Decreased strength;Decreased safe awareness;Decreased cognition;Decreased balance;Decreased vision/visual deficit;Decreased posture;Decreased fine motor control;Decreased coordination  Assessment: Pt required maxA x2 for bed mobility and maxA x2 to stand from EOB to RW,. Pt nonverbal at baseline, noted mild R side neglect with pt following commands ~15% of the time. Pt currently unsafe to attempt any aspect of mobility without significant physical assistance. Not safe to return to prior living arrangement at this time, will need 24/7 assistance at discharge. Would benefit from continued PT to maximize safeyt and independence  Therapy Prognosis: Fair  Requires PT Follow-Up: Yes  Activity Tolerance  Activity Tolerance: Patient limited by endurance;Treatment limited 
Physical Therapy  Facility/Department: [address] STEPDOWN   Physical Therapy Daily Treatment Note    Patient Name: Carrington Mulligan        MRN: [id number]    : 1955    Date of Service: 2025    Chief Complaint   Patient presents with    Cerebrovascular Accident     Past Medical History:  has a past medical history of Allergic rhinitis, Autism, Epilepsy, GERD (gastroesophageal reflux disease), Hypertriglyceridemia, Mental retardation, Occupational circumstances, and Seizures ([facility]).  Past Surgical History:  has a past surgical history that includes Colonoscopy (); other surgical history (Right, 2021); Finger nail surgery (Right, 2021); and thrombectomy (2025).    Discharge Recommendations  Discharge Recommendations: Patient would benefit from continued therapy after discharge  PT Equipment Recommendations  Equipment Needed: No  Other: Pt requires extensive assistance to mobilize at this time.    Assessment  Body Structures, Functions, Activity Limitations Requiring Skilled Therapeutic Intervention: Decreased functional mobility ;Decreased strength;Decreased safe awareness;Decreased cognition;Decreased balance;Decreased vision/visual deficit;Decreased posture;Decreased fine motor control;Decreased coordination  Assessment: Pt ambulated in 20-30 ft trials RW modAx2, completed transfers Daily x2 to RW and bed mobility maxAx2.  Pt neglecting R side with noted primary L gaze.  Pt is resistant when encouraged to glance R.  Pt is nonverbal at baseline. Pt would benefit from continued PT.  Therapy Prognosis: Fair  Activity Tolerance  Activity Tolerance: Treatment limited secondary to decreased cognition  Safety Devices  Type of Devices: Call light within reach;Gait belt;Heels elevated for pressure relief;Patient at risk for falls;Nurse notified;Left in bed;Bed alarm in place  Restraints  Restraints Initially in Place: No    AM-PAC  AM-PAC Basic Mobility - Inpatient   How much help is needed turning 
[facility] Neurology   IN-PATIENT SERVICE   [facility]    Progress Note             Date:   4/25/2025  Patient name:  Carrington Mulligan  Date of admission:  4/18/2025  1:12 PM  MRN:   [id number]  Account:  [id number]  YOB: 1955  PCP:    John Santacruz MD  Room:   [address] [place] [address]  Code Status:    Full Code    Chief Complaint:     Chief Complaint   Patient presents with    Cerebrovascular Accident       Interval hx:     The patient was seen and examined at bedside.   Alert and awake today morning  S/p PEG tube yesterday  Will start Eliquis tomorrow morning and stop aspirin as per cardiology recommendation for interatrial defect.  Will repeat CT scan of the head after day of starting Eliquis.   working on placement.      Brief History of Present Illness:     69-year-old male with a history of autism, developmental delay (nonverbal), and seizures presented on 4/18/25 as a transfer from [facility] with acute left M1 MCA occlusion after being found with right-sided weakness; last known well was 4/17 at 2200. Initial NIHSS was 8, rising to 23 on arrival at [facility], with right hemiparesis, gaze deviation, and right visual field cut. Imaging showed developing left MCA infarct and penumbra; he underwent cerebral angiogram with mechanical thrombectomy. Over the last 24 hours, exam improved with resolution of gaze deviation, but right-sided contractures persist. CT head showed petechial hemorrhages without major bleed; aspirin 81?mg started. NG tube placed after failed swallow study; tube feeds initiated. BP labile overnight, managed with PRNs; Norvasc increased to 10?mg. DVT prophylaxis with Lovenox started. Labs notable for K+ 3.3 (replaced), stable Hgb at 11.3. Patient remains nonverbal, which is baseline. MRI brain shows around 30-40% stroke in LMCA territory, with no SWI changes     Past Medical History:     Past Medical History:   Diagnosis Date    Allergic rhinitis     
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 Seen for repeat bedside swallow study.  Pt. Initially refused all PO trials.  With encouragement, pt. Eventually allowed tsp. to placed in oral cavity, however made no attempt to take from spoon.  Ice chips placed in oral cavity, + anterior oral loss from left side.  Given additional trials of ice chips (pt. Did take from spoon), continued oral loss, + swallowing noted with eventual coughing.  Pt. Took applesauce from spoon.  However, made no attempt to move bolus A-P and eventually PO was suctioned from oral cavity.  + coughing noted.      Pt. Not appropriate for PO at this time.  ST recommends NPO and continuing to follow up to assess readiness for modified barium swallow study.  Results and recommendations reported to RN.          Plan:  [x] Continue ST services    [] Discharge from ST:        Discharge recommendations: []  Further therapy recommended at discharge.The patient should be able to 
[facility] Neurology   IN-PATIENT SERVICE   [facility]    Progress Note             Date:   4/29/2025  Patient name:  Carrington Mulligan  Date of admission:  4/18/2025  1:12 PM  MRN:   [id number]  Account:  [id number]  YOB: 1955  PCP:    John Santacruz MD  Room:   [address] [place] [address]  Code Status:    Full Code    Chief Complaint:     Chief Complaint   Patient presents with    Cerebrovascular Accident       Interval hx:     The patient was seen and examined at bedside.   Alert and awake today morning  Nonverbal  Reached out to neuroendovascular and cardiology whether the patient needs to be on Eliquis for Rt to left shunt.  Neuroendovascular wants the patient to be on aspirin.  Neuroendovascular team did have a discussion with cardiology team about the indication of Eliquis. Will switch back to aspirin.  Awaiting placement    Brief History of Present Illness:     Per H&P  69-year-old male with a history of autism, developmental delay (nonverbal), and seizures presented on 4/18/25 as a transfer from [facility] with acute left M1 MCA occlusion after being found with right-sided weakness; last known well was 4/17 at 2200. Initial NIHSS was 8, rising to 23 on arrival at [facility], with right hemiparesis, gaze deviation, and right visual field cut. Imaging showed developing left MCA infarct and penumbra; he underwent cerebral angiogram with mechanical thrombectomy. Over the last 24 hours, exam improved with resolution of gaze deviation, but right-sided contractures persist. CT head showed petechial hemorrhages without major bleed; aspirin 81?mg started. NG tube placed after failed swallow study; tube feeds initiated. BP labile overnight, managed with PRNs; Norvasc increased to 10?mg. DVT prophylaxis with Lovenox started. Labs notable for K+ 3.3 (replaced), stable Hgb at 11.3. Patient remains nonverbal, which is baseline. MRI brain shows around 30-40% stroke in LMCA territory, with no SWI 
Writer was alerted by another nurse that lab was having trouble getting patient to wake up. Writer went into pts room to assess, pt withdrew to touch and painful stimuli but still did not open his eyes. Writer notified neurology via secure message at 0655 of findings and ask could someone come bedside to assess pt. Dr. Henao did come down to assess pt, and stated he would inform the day team of what was going on and have them come assess the pt as well. No new orders placed at this time. Vitals are stable, lungs diminished bilat, pt lying in bed and doesn't appear to be in any distressed at this time. Plan of care ongoing.  
      IV Infusions (if any):   sodium chloride      sodium chloride      sodium chloride 75 mL/hr at 04/25/25 0600       Diagnostics:   Telemetry: Sinus rhythm     EKG 4/18/2025  Sinus rhythm 86 bpm, voltage criteria for LVH     Echocardiogram 4/21/2025    Normal left ventricular systolic function with EF 50 - 55%. EF by 2D Simpsons Biplane is 50%. Left ventricle size is normal. Normal wall thickness. Normal wall motion.    Right ventricle size is normal. Normal systolic function.     Agitated saline study was positive without provocation. Right to left shunt was noted.    Mild aortic regurgitation.    RVSP 28 mmHg    Image quality is adequate. Technically difficult study due to low parasternal window and procedure performed with the patient in a supine position.    Labs:   CBC:  Recent Labs     04/24/25  0410 04/25/25  0612   WBC 6.4 7.7   HGB 11.9* 12.6*   HCT 38.4* 37.4*    195     Magnesium:  Recent Labs     04/24/25  0410   MG 2.2     BMP:  Recent Labs     04/24/25  0410 04/25/25  0612    138   K 3.6* 3.7   CALCIUM 9.0 8.9   CO2 23 21   BUN 17 17   CREATININE 0.7 0.6*   LABGLOM >90 >90   GLUCOSE 90 90     BNP:No results for input(s): \"BNP\", \"PROBNP\" in the last 72 hours.  PT/INR:No results for input(s): \"PROTIME\", \"INR\" in the last 72 hours.  APTT:No results for input(s): \"APTT\" in the last 72 hours.  CARDIAC ENZYMES:No results for input(s): \"MYOGLOBIN\", \"CKTOTAL\", \"CKMB\", \"CKMBINDEX\", \"TROPHS\", \"TROPONINT\" in the last 72 hours.  FASTING LIPID PANEL:      Component Value Date/Time    CHOL 90 04/19/2025 0823    HDL 31 (L) 04/19/2025 0823    LDL 47 04/19/2025 0823    TRIG 60 04/19/2025 0823     LIVER PROFILE:No results for input(s): \"AST\", \"ALT\", \"LABALBU\", \"ALKPHOS\", \"BILITOT\", \"BILIDIR\", \"IBILI\", \"GLOB\", \"ALBUMIN\" in the last 72 hours.    Invalid input(s): \"PROT\"  TSH:    Recent Labs     04/19/25  0823   TSH 0.63      HGA1C:    Recent Labs     04/19/25 0823   LABA1C 4.7        Impression: 
COTTON SWAB 3/21/25   John Santacruz MD   sertraline (ZOLOFT) 100 MG tablet Take 1 tablet by mouth daily 2/25/25   ProviderTito MD   mirtazapine (REMERON) 15 MG tablet Take 1 tablet by mouth nightly Start Remeron 7.5 mg nightly for 2 weeks then after that 2 tablets nightly 2 weeks. 3/4/25   John Santacruz MD   Calcium Carb-Cholecalciferol (OYSTER SHELL CALCIUM W/D) 500-5 MG-MCG TABS tablet TAKE 1 TABLET BY MOUTH TWICE DAILY 11/13/24   John Santacruz MD   omeprazole (PRILOSEC) 20 MG delayed release capsule TAKE 2 CAPSULES (40MG) BY MOUTH ONCE EVERY DAY 11/13/24   John Santacruz MD   oxyBUTYnin (DITROPAN-XL) 5 MG extended release tablet TAKE 1 TABLET BY MOUTH ONCE EVERY DAY 11/13/24   John Santacruz MD   cetirizine (ZYRTEC) 10 MG tablet Take 1 tablet by mouth daily 8/19/24   John Santacruz MD   Acetaminophen Extra Strength 500 MG TABS TAKE 2 TABLETS (1000MG) BY MOUTH EVERY 8 HOURS AS NEEDED FOR PAIN OR FEVER GREATER THAN 100.4 *MAX 6 TABS IN 12HRS* 7/31/24   John Santacruz MD   Disposable Gloves MISC 1 pair as directed QD-BID 4/10/23   John Santacruz MD   Incontinence Supply Disposable (FQ PROTECTIVE UNDERWEAR) MISC USE AS DIRECTED FOR INCONTINENCE (319) *CALL PHARMACY FOR REFILLS*    SIZE SMALL/MEDIUM 7/26/21   John Santacruz MD   QUEtiapine (SEROQUEL) 25 MG tablet Take 1 tablet by mouth nightly    ProviderTito MD   Disposable Gloves (VINYL GLOVES LARGE) MISC 1 PAIR AS DIRECTED QD-BID 6/28/17   John Santacruz MD    Scheduled Meds:   apixaban  5 mg Per G Tube BID    lansoprazole  30 mg Oral QAM AC    amLODIPine  10 mg PEG Tube Daily    lisinopril  40 mg PEG Tube Nightly    atorvastatin  40 mg PEG Tube Nightly    sertraline  100 mg Per NG tube Daily    mirtazapine  15 mg Per NG tube Nightly    sodium chloride flush  5-40 mL IntraVENous 2 times per day    sodium chloride flush  5-40 mL IntraVENous 2 times per day     Continuous Infusions:   sodium 
notified;Patient at risk for falls;All fall risk precautions in place  Restraints  Restraints Initially in Place: No    AM-PAC  AM-[facility] Basic Mobility - Inpatient   How much help is needed turning from your back to your side while in a flat bed without using bedrails?: A Lot  How much help is needed moving from lying on your back to sitting on the side of a flat bed without using bedrails?: A Lot  How much help is needed moving to and from a bed to a chair?: A Lot  How much help is needed standing up from a chair using your arms?: A Lot  How much help is needed walking in hospital room?: A Lot  How much help is needed climbing 3-5 steps with a railing?: Total  AM-PAC Inpatient Mobility Raw Score : 11  AM-PAC Inpatient T-Scale Score : 33.86  Mobility Inpatient CMS 0-100% Score: 72.57  Mobility Inpatient CMS G-Code Modifier : CL    Restrictions/Precautions  Restrictions/Precautions  Restrictions/Precautions: Fall Risk;General Precautions;NPO  Activity Level: Up as Tolerated;Up with Assist  Required Braces or Orthoses?: No  Position Activity Restriction  Other Position/Activity Restrictions: Please keep sbp 100-150 strict per endovascular, peg tube       Subjective  General  Chart Reviewed: No  Patient assessed for rehabilitation services?: Yes  Response To Previous Treatment: Patient unable to report, no changes reported from family or staff  Family/Caregiver Present: No  Follows Commands: Impaired  Other (Comment): Pt demonstrating improvement in following commands this date.  General  General Comments: Pt retired to recliner with call light and RN notified  Subjective  Subjective: PT and RN agreeable to tx session, Pt supine in bed no noteable pain t/o session. Pt pleasant and cooeprative.       Objective  Orientation  Overall Orientation Status: Impaired  Orientation Level: Unable to assess (Non verbal)  Cognition  Overall Cognitive Status: Exceptions  Arousal/Alertness: Inconsistent responses to stimuli  Following 
(DITROPAN-XL) 5 MG extended release tablet TAKE 1 TABLET BY MOUTH ONCE EVERY DAY 24   John Santacruz MD   cetirizine (ZYRTEC) 10 MG tablet Take 1 tablet by mouth daily 24   John Santacruz MD   Acetaminophen Extra Strength 500 MG TABS TAKE 2 TABLETS (1000MG) BY MOUTH EVERY 8 HOURS AS NEEDED FOR PAIN OR FEVER GREATER THAN 100.4 *MAX 6 TABS IN 12HRS* 24   John Santacruz MD   Disposable Gloves MISC 1 pair as directed QD-BID 4/10/23   John Santacruz MD   Incontinence Supply Disposable (FQ PROTECTIVE UNDERWEAR) MISC USE AS DIRECTED FOR INCONTINENCE (319) *CALL PHARMACY FOR REFILLS*    SIZE SMALL/MEDIUM 21   John Santacruz MD   QUEtiapine (SEROQUEL) 25 MG tablet Take 1 tablet by mouth nightly    Provider, MD Tito   Disposable Gloves (VINYL GLOVES LARGE) MISC 1 PAIR AS DIRECTED QD-BID 17   John Santacruz MD        Allergies:     Patient has no known allergies.    Social History:     Tobacco:    reports that he has never smoked. He has never used smokeless tobacco.  Alcohol:      reports no history of alcohol use.  Drug Use:  reports no history of drug use.    Family History:     History reviewed. No pertinent family history.    Review of Systems:     Review of Systems  Unable to obtain  Physical Exam:   BP (!) 146/78   Pulse 93   Temp 99.1 °F (37.3 °C) (Axillary)   Resp 21   Ht 1.575 m (5' 2\")   Wt 58.7 kg (129 lb 6.6 oz)   SpO2 97%   BMI 23.67 kg/m²   Temp (24hrs), Av.7 °F (37.1 °C), Min:98.2 °F (36.8 °C), Max:99.2 °F (37.3 °C)    Recent Labs     25  1027   POCGLU 96       Intake/Output Summary (Last 24 hours) at 2025 0835  Last data filed at 2025 0600  Gross per 24 hour   Intake 1021.95 ml   Output 1675 ml   Net -653.05 ml         Neurological Exam     GENERAL  Appears comfortable and in no distress   HEENT  NC/ AT   HEART  S1 and S2 heard; palpation of pulses: radial pulse    NECK  Supple and no bruits heard   MENTAL STATUS:  Alert 
Gastroesophageal reflux disease     Allergic rhinitis with postnasal drip cough     History of colon polyps     Stereotypy     UTI (urinary tract infection)     Delirium     Acute ischemic left middle cerebral artery (MCA) stroke (HCC)     Global aphasia     Spastic hemiparesis of right dominant side due to acute cerebrovascular disease (HCC)     Cerebrovascular accident (CVA) (HCC)     Severe malnutrition     Dysphagia      ASSESSMENT/PLAN:  1. Acute stroke with concerns of embolic etiology and presence of interatrial septal defect   -continue apixaban to prevent future events  -avoid aspirin and NSAIDs due to erosive gastropathy     2. Dyslipidemia; continue atorvastatin 40 mg daily     3. Essential hypertension;   -continue lisinopril in a.m. and amlodipine in p.m.  -keep potassium more than 4 magnesium more than 2     4. Cognitive impairment, nonverbal, seizure disorder, depression/anxiety, GERD and other medical issues as per medicine     May discharge and cardiology will sign off at this point, thank you     The case is discussed with nursing staff and neurology team    Yodit Mauricio MD, [facility]    
WFLs, responds to most cues and name)    BUE Assessment  Gross Assessment  Strength: Generally decreased, functional (Patient not able to follow commands for MMT however base of movement and performance LUE grossly 3+/5, RUE 3-/5)  Hand Dominance: Right     Objective  Orientation  Overall Orientation Status: Impaired  Orientation Level: Unable to assess (pt non verbal at baseline, unknown if he follows commands for his care givers; he didn't follow any verbal only commands, intermittently assisted/resisted ROM x 4)  Cognition  Overall Cognitive Status: Exceptions  Arousal/Alertness: Inconsistent responses to stimuli  Following Commands: Impaired;Inconsistently follows commands  Attention Span: Difficulty attending to directions  Memory:  (MARIO)  Safety Judgement: Impaired  Problem Solving: Impaired  Insights: Impaired  Initiation: Requires cues for all  Sequencing: Requires cues for all    Activities of Daily Living  Feeding Skilled Clinical Factors: Has been refusing p.o intake, currently has alternative nutrition, likely if willing to eat would need set up and min to stand by assist depending on food  Grooming: Minimal assistance;Based on clinical judgement  Grooming Skilled Clinical Factors: Noted decreased strength, ROM is AA/PROM, likely would need assist, attempted to engage in oral care/combing hair however patient pushing ADL equipment away during attempt  UE Bathing: Maximum assistance;Based on clinical judgement  LE Bathing: Maximum assistance;Based on clinical judgement  UE Dressing: Moderate assistance  UE Dressing Skilled Clinical Factors: Able to follow commands to lift arms, to put sleeves on, requires assist to bring over head, likely if putting on tshirt would need assist to pull down in back  LE Dressing: Maximum assistance  LE Dressing Skilled Clinical Factors: Requires assist to thread feet into socks, anticpate requires assist to pull over knees, to pull over hips  Toileting: Maximum 
chloride      sodium chloride      sodium chloride      sodium chloride 75 mL/hr at 04/21/25 1741     PRN Meds:.polyethylene glycol, sodium chloride flush, sodium chloride, ondansetron **OR** ondansetron, sodium chloride flush, sodium chloride, sodium chloride flush, sodium chloride, prochlorperazine, labetalol **OR** hydrALAZINE  Past Medical History   has a past medical history of Allergic rhinitis, Autism, Epilepsy, GERD (gastroesophageal reflux disease), Hypertriglyceridemia, Mental retardation, Occupational circumstances, and Seizures ([facility]).  Past Surgical History   has a past surgical history that includes Colonoscopy (2012); other surgical history (Right, 07/29/2021); Finger nail surgery (Right, 07/29/2021); and thrombectomy (04/18/2025).  Social History   reports that he has never smoked. He has never used smokeless tobacco.   reports no history of alcohol use.   reports no history of drug use.  Family History  family history is not on file.    Review of Systems:  NA given current condition and baseline,       OBJECTIVE:     Vitals:    04/22/25 0400   BP:    Pulse:    Resp:    Temp: 98.3 °F (36.8 °C)   SpO2:         Gen: No acute distress  MS: Awake, Non-verbal at baseline, does not follow commands,+tracks  CN: Pupils reactive, no gaze deviation, appears to respond in to BTT in bL visual fields, R facial droop  Motor: RUE 3/5, RLE 2/5, LUE 4/5, LLE 4/5 with drift to bed (likely d/t lack of comprehension vs true weakness)  Sens: Responds to LT in all extremities but overall mild decreased responsiveness throughout  Gait: Deferred        NIH Stroke Scale:   1a  Level of consciousness: 0 - alert; keenly responsive   1b. LOC questions:  2 - answers neither question correctly   1c. LOC commands: 2 - performs neither task correctly   2.  Best Gaze: 0 - normal   3. Visual: 0 - no visual loss   4. Facial Palsy: 2 - partial paralysis (total or near total paralysis of the lower face)   5a. Motor left arm: 0 - no 
chloride      sodium chloride      sodium chloride      sodium chloride 75 mL/hr at 04/23/25 0016     PRN Meds:.polyethylene glycol, sodium chloride flush, sodium chloride, ondansetron **OR** ondansetron, sodium chloride flush, sodium chloride, sodium chloride flush, sodium chloride, prochlorperazine, labetalol **OR** hydrALAZINE  Past Medical History   has a past medical history of Allergic rhinitis, Autism, Epilepsy, GERD (gastroesophageal reflux disease), Hypertriglyceridemia, Mental retardation, Occupational circumstances, and Seizures ([facility]).  Past Surgical History   has a past surgical history that includes Colonoscopy (2012); other surgical history (Right, 07/29/2021); Finger nail surgery (Right, 07/29/2021); and thrombectomy (04/18/2025).  Social History   reports that he has never smoked. He has never used smokeless tobacco.   reports no history of alcohol use.   reports no history of drug use.  Family History  family history is not on file.    Review of Systems:  NA given current condition and baseline,       OBJECTIVE:     Vitals:    04/23/25 0800   BP: 136/76   Pulse: 70   Resp: (!) 114   Temp: 98 °F (36.7 °C)   SpO2: 98%        Gen: No acute distress  MS: Awake, Non-verbal at baseline, does not follow commands,+tracks  CN: Pupils reactive, no gaze deviation, appears to respond in to BTT in bL visual fields, R facial droop  Motor: RUE 3/5, RLE 2/5, LUE 4/5, LLE 4/5 with drift to bed (likely d/t lack of comprehension vs true weakness)  Sens: Responds to LT in all extremities but overall mild decreased responsiveness throughout  Gait: Deferred        NIH Stroke Scale:   1a  Level of consciousness: 0 - alert; keenly responsive   1b. LOC questions:  2 - answers neither question correctly   1c. LOC commands: 2 - performs neither task correctly   2.  Best Gaze: 0 - normal   3. Visual: 0 - no visual loss   4. Facial Palsy: 2 - partial paralysis (total or near total paralysis of the lower face)   5a. Motor left 
  5a. Motor left arm: 0 - no drift, limb holds 90 (or 45) degrees for full 10 seconds   5b.  Motor right arm: 2 - some effort against gravity, limb cannot get to or maintain (if cued) 90 (or 45) degrees, drifts down to bed, but has some effort against gravity    6a. Motor left le - some effort against gravity; leg falls to bed by 5 seconds but has some effort against gravity   6b  Motor right leg:  3 - no effort against gravity; leg falls to bed immediately   7. Limb Ataxia: 0 - absent   8.  Sensory: 1 - mild to moderate sensory loss; patient feels pinprick is less sharp or is dull on the affected side; there is a loss of superficial pain with pinprick but patient is aware of being touched    9. Best Language:  3 - mute, global aphasia; no usable speech or auditory comprehension   10. Dysarthria: 2 - severe; patient speech is so slurred as to be unintelligible in the absence of or our of proportion to any dysphagia, or is mute/anarthric    11. Extinction and Inattention: 0 - no abnormality         Total:    19     MRS baseline: 4  MRS Current  4      LABS:   Reviewed.  Lab Results   Component Value Date    HGB 12.6 (L) 2025    WBC 7.7 2025     2025     2025    BUN 17 2025    CREATININE 0.6 (L) 2025    AST 34 2025    ALT 27 2025    MG 2.2 2025    APTT 30.8 2025    INR 1.3 2025      Lab Results   Component Value Date/Time    COVID19 Not Detected 2023 04:35 PM       RADIOLOGY:   Images were personally reviewed including:  CTH CTA and CTP                              DSA 25  Selective angiograms from the L CCA, L ICA, and R femoral.  L distal M1 occlusion TICI 0  Above treated with 8F short sheath, 8F [place] BGC, Red 72, Send-IT, SL-10, Synchro, and Solitaire 4x40 SR. First pass TICI 3 recanalization, with residual spasm/stenosis ~40% in the distal M1 and persistent slow flow in one of the distal M4 parietal branches.   
Patient to follow simple commands with increased time 3/4 attempts    Plan  Occupational Therapy Plan  Times Per Week: 4-5x/wk  Current Treatment Recommendations: Strengthening, Balance training, ROM, Functional mobility training, Endurance training, Neuromuscular re-education, Cognitive reorientation, Safety education & training, Patient/Caregiver education & training, Equipment evaluation, education, & procurement, Positioning, Self-Care / ADL, Home management training, Cognitive/Perceptual training, Co-Treatment    Minutes  OT Individual Minutes  Time In: 1407  Time Out: 1454  Minutes: 47  Time Code Minutes   Timed Code Treatment Minutes: 38 Minutes    Co- treatment with PT warranted secondary to decreased patient safety and independence with functional mobility requiring skilled physical assistance of two professionals to simultaneously address individualized discipline goals. OT is addressing ADLs , while PT is addressing their individualized functional mobility task.      Electronically signed by Julio César PINEDA on 4/23/25 at 3:43 PM ANIBAL FENTON  
answer       Family History:   History reviewed. No pertinent family history.    Allergies:    Patient has no known allergies.    Medications Prior to Admission:    Medications Prior to Admission: neomycin-bacitracin-polymyxin (NEOSPORIN) 3.5-400-5000 OINT ointment, APPLY TO AFFECTED AREA(S) TOPICALLY 4 TIMES DAILY AS NEEDED  levoFLOXacin (LEVAQUIN) 500 MG tablet, Take 1 tablet by mouth daily for 7 days  psyllium (REGULOID) 400 MG capsule, Take 1 capsule by mouth in the morning and at bedtime  chlorhexidine (PERIDEX) 0.12 % solution, (REQUEST REFILL WHEN NEEDED) USE ORALLY TO RINSE TWICE DAILY WITH COTTON SWAB  sertraline (ZOLOFT) 100 MG tablet, Take 1 tablet by mouth daily  mirtazapine (REMERON) 15 MG tablet, Take 1 tablet by mouth nightly Start Remeron 7.5 mg nightly for 2 weeks then after that 2 tablets nightly 2 weeks.  Calcium Carb-Cholecalciferol (OYSTER SHELL CALCIUM W/D) 500-5 MG-MCG TABS tablet, TAKE 1 TABLET BY MOUTH TWICE DAILY  omeprazole (PRILOSEC) 20 MG delayed release capsule, TAKE 2 CAPSULES (40MG) BY MOUTH ONCE EVERY DAY  oxyBUTYnin (DITROPAN-XL) 5 MG extended release tablet, TAKE 1 TABLET BY MOUTH ONCE EVERY DAY  cetirizine (ZYRTEC) 10 MG tablet, Take 1 tablet by mouth daily  Acetaminophen Extra Strength 500 MG TABS, TAKE 2 TABLETS (1000MG) BY MOUTH EVERY 8 HOURS AS NEEDED FOR PAIN OR FEVER GREATER THAN 100.4 *MAX 6 TABS IN 12HRS*  Disposable Gloves MISC, 1 pair as directed QD-BID  Incontinence Supply Disposable (FQ PROTECTIVE UNDERWEAR) MISC, USE AS DIRECTED FOR INCONTINENCE (319) *CALL PHARMACY FOR REFILLS*  SIZE SMALL/MEDIUM  QUEtiapine (SEROQUEL) 25 MG tablet, Take 1 tablet by mouth nightly  Disposable Gloves (VINYL GLOVES LARGE) MISC, 1 PAIR AS DIRECTED QD-BID    Current Medications:  Current Facility-Administered Medications: aspirin chewable tablet 81 mg, 81 mg, Oral, Daily  enoxaparin (LOVENOX) injection 40 mg, 40 mg, SubCUTAneous, Daily  mirtazapine (REMERON) tablet 15 mg, 15 mg, Oral, 
understanding    Plan  Physical Therapy Plan  General Plan:  (5-6 visits weekly)  Current Treatment Recommendations: Strengthening, ROM, Balance training, Functional mobility training, Transfer training, Gait training, Neuromuscular re-education, Patient/Caregiver education & training, Equipment evaluation, education, & procurement, Positioning, Therapeutic activities    Goals  Patient Goals   Patient Goals : pt unable to state--non verbal at baseline  Short Term Goals  Time Frame for Short Term Goals: 14 visits  Short Term Goal 1: prevent contractures x 4 and facilitate active movement x 4, bebeto RU/LEs  Short Term Goal 2: bed mobility min A+1  Short Term Goal 3: transfers with min A+1  Short Term Goal 4: gait with appropriate device vs none x 25' with min A+1  Short Term Goal 5: stair ambulation if needed at group home with min A+2    Minutes  PT Individual Minutes  Time In: 1417  Time Out: 1452  Minutes: 35  Time Code Minutes  Timed Code Treatment Minutes: 30 Minutes    Co- treatment with OT warranted secondary to decreased patient safety and independence with functional mobility requiring skilled physical assistance of two professionals to simultaneously address individualized discipline goals. PT is addressing sitting and standing balance, while OT is addressing their individualized functional mobility/self-care task.       Electronically signed by ROXIE Leon on 4/21/25 at 4:24 PM EDT     Treatment performed by Student PTA under the supervision of co-signing PTA who agrees with all treatment and documentation.    Charmaine Adams PTA    
              CT 4/18/25        ASSESSMENT:     # P/w AMS and R-sided weakness, NIH 22 (known poor baseline as well)    # L distal M1 occlusion, now s/p TICI 3 with residual spasm/stenosis ~40% in the distal M1 and persistent slow flow in one of the distal M4 parietal branches    Note: Patients with pre-existing disability (defined as moderate mRS 2-3 and severe mRS 4-5) comprise ~30% of all acute ischemic strokes, including a large proportion of LVOs. Yet, these patients are often excluded from clinical trials examining the efficacy of endovascular thrombectomy (ANA PAULA mRS<1, DEFUSE mRS<2, EXTEND-IA mRS<2, SELECT2 mRS<1, and many others). Some have even argued that their pre-stroke disability may predict longer hospital stays, increased in-hospital complications, and higher risk of adverse post-stroke outcomes [PMID 33127699]. Understandably, this may foster some hesitation and nihilism [PMID 97122077, PMID 12479263]. However, analysis of the prospective cohort OXVASC study showed that untreated disabled patients still have a ~79% survival rate to 3 months and incur worsening incremental disability specifically due to their acute stroke, which leads to additional healthcare costs, higher dependent care needs and reduced quality of life [PMID: 18897200]. An excellent review article was recently published in 2024 Stroke Journal [https://doi.org/10.1161/SVIN.124.047860] reviewing a combination of multicenter and observational studies and data from large-scale registries specifically investigating the efficacy of EVT in patients with pre-existing disability. The review summarized that \"EVT can limit accumulated disability after LVO stroke in these patients\", with an additional nuance that mRS may not be the best measure of disability in patients who already have a poor baseline. There is ongoing research on this topic, including a new RCT [TESTED; [id number]] investigating traditional and patient-centered outcomes of 
Basophils % 1 0 - 2 %    Immature Granulocytes % 1 (H) 0 %    Neutrophils Absolute 3.86 1.50 - 8.10 k/uL    Lymphocytes Absolute 1.46 1.10 - 3.70 k/uL    Monocytes Absolute 0.78 0.10 - 1.20 k/uL    Eosinophils Absolute 0.17 0.00 - 0.44 k/uL    Basophils Absolute 0.08 0.00 - 0.20 k/uL    Immature Granulocytes Absolute 0.03 0.00 - 0.30 k/uL     Recent Labs     04/24/25  0410   WBC 6.4   RBC 4.08*   HGB 11.9*   HCT 38.4*   MCV 94.1   MCH 29.2   MCHC 31.0   RDW 13.9      MPV 11.1     Recent Labs     04/24/25  0410      K 3.6*      CO2 23   BUN 17   CREATININE 0.7   GLUCOSE 90   CALCIUM 9.0     Hemoglobin A1C   Date Value Ref Range Status   04/19/2025 4.7 4.0 - 6.0 % Final       Assessment :      Primary Problem  Acute ischemic left middle cerebral artery (MCA) stroke ([facility])    Active Hospital Problems    Diagnosis Date Noted    Cerebrovascular accident (CVA) ([facility]) [I63.9] 04/19/2025    Acute ischemic left middle cerebral artery (MCA) stroke ([facility]) [I63.512] 04/18/2025    Global aphasia [R47.01] 04/18/2025    Spastic hemiparesis of right dominant side due to acute cerebrovascular disease ([facility]) [I67.89, G81.11] 04/18/2025     69-year-old male with autism, developmental delay, and seizures presented on 4/18/25 with acute left M1 MCA occlusion and right hemiparesis. He underwent cerebral angiogram with thrombectomy. Exam improved; right-sided contractures persist. CT showed petechial hemorrhage; MRI revealed 30-40% LMCA stroke. Started on aspirin, Lovenox, tube feeds. BP labile overnight; Norvasc increased. Patient remains nonverbal, baseline.     #Moderate/large L MCA territory infarct, status post mechanical thrombectomy  # Developmental delay, nonverbal and poor baseline         Plan:     #Moderate/large L MCA territory infarct, status post mechanical thrombectomy  # Developmental delay, nonverbal and poor baseline  - Continue aspirin 81 Mg daily, Lipitor 80 Mg at bedtime  - LDL 47, A1c 4.7, ECHO 50 to 
  GLUCOSE 129*   CALCIUM 9.1     Hemoglobin A1C   Date Value Ref Range Status   04/19/2025 4.7 4.0 - 6.0 % Final       Assessment :      Primary Problem  Acute ischemic left middle cerebral artery (MCA) stroke ([facility])    Active Hospital Problems    Diagnosis Date Noted    Cerebrovascular accident (CVA) ([facility]) [I63.9] 04/19/2025    Acute ischemic left middle cerebral artery (MCA) stroke (H[facility]) [I63.512] 04/18/2025    Global aphasia [R47.01] 04/18/2025    Spastic hemiparesis of right dominant side due to acute cerebrovascular disease (HCC) [I67.89, G81.11] 04/18/2025     69-year-old male with autism, developmental delay, and seizures presented on 4/18/25 with acute left M1 MCA occlusion and right hemiparesis. He underwent cerebral angiogram with thrombectomy. Exam improved; right-sided contractures persist. CT showed petechial hemorrhage; MRI revealed 30-40% LMCA stroke. Started on aspirin, Lovenox, tube feeds. BP labile overnight; Norvasc increased. Patient remains nonverbal, baseline.     #Moderate/large L MCA territory infarct, status post mechanical thrombectomy  # Developmental delay, nonverbal and poor baseline         Plan:     #Moderate/large L MCA territory infarct, status post mechanical thrombectomy  # Developmental delay, nonverbal and poor baseline  - Continue aspirin 81 Mg daily, Lipitor 80 Mg at bedtime  - LDL 47, A1c 4.7, ECHO 50 to 55% ejection fraction with positive bubble study, will consult cardiology, will have bilateral upper and lower extremity Doppler  - Holter monitor at discharge  - NG tube in situ, repeat modified barium swallow pending  - Goal -140  - Neuroendovascular on board  - PT OT SLP  - Regular neurovascular checks    # History of hypertension  - Goal - 140  - Increase lisinopril to 20 Mg daily  -Continue Norvasc 10 mg daily  - Continue telemetry    DVT prophylaxis: Lovenox subcu  GI prophylaxis: None  Diet: Tube feedings via NG(repeat modified barium swallow 
pre-existing disability, and even if our disabled patient's mRS does not improve, the possibility of reducing his current worsened neurological deficits would provide significant benefits to his care. The above was discussed amongst our multidisciplinary team and the patient's DPOA, and a collective decision was made to proceed with EVT.    # Moderate/large L MCA territory infarct    # Cardiology on board rec Eliquis for the intraatrial septal defect    # TTE showing EF 50-55% but positive bubble study EF. No evidence of DVT on BUE/BLE    # Etiology ESUS though w/ high suspicion for cardioembolic    # Developmental delay, lives in SNF, nonverbal and poor baseline, has guardian    PLAN:     -- -140  -- Eliquis for the intraatrial septal defect  -- Can stop Aspirin. No indication for concurrent antiplatelet therapy when on AC  -- Continue stroke workup including possible Holter  -- Cardiology team following  -- PT/OT/ST, pending discharge planning  -- Rest of care per primary team    Discussed with Dr. Ott    Please arrange follow-up with Dr. Robert workman in 8-12 weeks, and with Dr. Leland Ott in 6-8 months.    Clifford Hutchinson MD, Pager [phone]  Electronically signed 04/25/25 at 8:06 AM  Stroke, Neurocritical Care & Neurointervention  [facility]  [facility]roke [place]     
therapy for LVOs even in patients with pre-existing disability, and even if our disabled patient's mRS does not improve, the possibility of reducing his current worsened neurological deficits would provide significant benefits to his care. The above was discussed amongst our multidisciplinary team and the patient's DPOA, and a collective decision was made to proceed with EVT.    # Moderate/large L MCA territory infarct    # Cardiology on board rec Eliquis for the intraatrial septal defect    # TTE showing EF 50-55% but positive bubble study EF. No evidence of DVT on BUE/BLE    # Etiology ESUS though w/ high suspicion for cardioembolic    # Developmental delay, lives in SNF, nonverbal and poor baseline, has guardian    PLAN:     -- -140  -- Eliquis for the intraatrial septal defect per Cardiology  -- No indication for concurrent antiplatelet therapy when on AC  -- Continue stroke workup and management per primary team  -- Cardiology team following  -- PT/OT/ST, pending discharge planning  -- Rest of care per primary team    Discussed with Dr. Argueta    Please arrange follow-up with Dr. Robert workman in 8-12 weeks, and with Dr. Leland Ott in 6-8 months.    Keshawn Jones MD, Pager [phone]  Electronically signed 04/27/25 at 7:25 AM  Stroke, Neurocritical Care & Neurointervention  [facility]  [facility] Stroke [place]

## 2025-04-30 NOTE — DISCHARGE SUMMARY
[facility]     Department of Neurology    INPATIENT DISCHARGE SUMMARY        Patient Identification:  Carrington Mulligan is a 70 y.o. male.  :  1955  MRN: [id number]     Acct: [id number]   Admit Date:  2025  Discharge date and time: 2025  Attending Provider: Dr. Pollock                                 Admission Diagnoses:   Acute ischemic left middle cerebral artery (MCA) stroke (HCC) [I63.512]  Cerebrovascular accident (CVA), unspecified mechanism (HCC) [I63.9]    Discharge Diagnoses:   Principal Problem:    Acute ischemic left middle cerebral artery (MCA) stroke (HCC)  Active Problems:    Global aphasia    Spastic hemiparesis of right dominant side due to acute cerebrovascular disease (HCC)    Cerebrovascular accident (CVA) (HCC)    Severe malnutrition    Dysphagia  Resolved Problems:    * No resolved hospital problems. *       Consults:   Neuro Endovascular  Cardiology    Brief Inpatient course:    69-year-old male with a history of autism, developmental delay (nonverbal), and seizures presented on 25 as a transfer from [facility] with acute left M1 MCA occlusion after being found with right-sided weakness; last known well was  at 2200. Initial NIHSS was 8, rising to 23 on arrival at [facility], with right hemiparesis, gaze deviation, and right visual field cut. Imaging showed developing left MCA infarct and penumbra; he underwent cerebral angiogram with mechanical thrombectomy.Post MT , exam improved with resolution of gaze deviation, but right-sided contractures persist. CT head showed petechial hemorrhages without major bleed; aspirin 81?mg started. NG tube placed after failed swallow study; tube feeds initiated. MRI brain shows around 30-40% stroke in LMCA territory, with no SWI changes and transferred to step down unit.    Patient underwent echocardiogram which showed EF of 50 to 55% with right-to-left shunt.  Patient failed modified barium swallow twice and

## 2025-04-30 NOTE — PLAN OF CARE
Problem: Pain  Goal: Verbalizes/displays adequate comfort level or baseline comfort level  Outcome: Progressing     Problem: Safety - Adult  Goal: Free from fall injury  Outcome: Progressing     
  Problem: Safety - Adult  Goal: Free from fall injury  4/18/2025 2212 by Abdirahman Cevallos RN  Outcome: Progressing  4/18/2025 1848 by Courtney Cuevas RN  Outcome: Progressing  Flowsheets (Taken 4/18/2025 1847)  Free From Fall Injury:   Instruct family/caregiver on patient safety   Based on caregiver fall risk screen, instruct family/caregiver to ask for assistance with transferring infant if caregiver noted to have fall risk factors     Problem: Chronic Conditions and Co-morbidities  Goal: Patient's chronic conditions and co-morbidity symptoms are monitored and maintained or improved  4/18/2025 2212 by Abdirahman Cevallos RN  Outcome: Progressing  4/18/2025 1848 by Courtney Cuevas, RN  Outcome: Progressing  Flowsheets (Taken 4/18/2025 1830)  Care Plan - Patient's Chronic Conditions and Co-Morbidity Symptoms are Monitored and Maintained or Improved:   Monitor and assess patient's chronic conditions and comorbid symptoms for stability, deterioration, or improvement   Collaborate with multidisciplinary team to address chronic and comorbid conditions and prevent exacerbation or deterioration   Update acute care plan with appropriate goals if chronic or comorbid symptoms are exacerbated and prevent overall improvement and discharge     Problem: Discharge Planning  Goal: Discharge to home or other facility with appropriate resources  4/18/2025 2212 by Abdirahman Cevallos RN  Outcome: Progressing  4/18/2025 1848 by Courtney Cuevas, RN  Outcome: Progressing  Flowsheets (Taken 4/18/2025 1830)  Discharge to home or other facility with appropriate resources:   Identify barriers to discharge with patient and caregiver   Arrange for needed discharge resources and transportation as appropriate   Identify discharge learning needs (meds, wound care, etc)   Refer to discharge planning if patient needs post-hospital services based on physician order or complex needs related to functional status, cognitive ability or social support system   
  Problem: Safety - Adult  Goal: Free from fall injury  4/21/2025 0539 by Gewn Prather RN  Outcome: Progressing  4/20/2025 1906 by Roseann Currie RN  Outcome: Progressing     Problem: Chronic Conditions and Co-morbidities  Goal: Patient's chronic conditions and co-morbidity symptoms are monitored and maintained or improved  4/21/2025 0539 by Gwen Prather RN  Outcome: Progressing  4/20/2025 1906 by Roseann Currie RN  Outcome: Progressing     Problem: Discharge Planning  Goal: Discharge to home or other facility with appropriate resources  4/21/2025 0539 by Gwen Prather RN  Outcome: Progressing  4/20/2025 1906 by Roseann Currie RN  Outcome: Progressing     Problem: Pain  Goal: Verbalizes/displays adequate comfort level or baseline comfort level  4/21/2025 0539 by Gwen Prather RN  Outcome: Progressing  4/20/2025 1906 by Roseann Currie RN  Outcome: Progressing     Problem: Skin/Tissue Integrity  Goal: Skin integrity remains intact  Description: 1.  Monitor for areas of redness and/or skin breakdown2.  Assess vascular access sites hourly3.  Every 4-6 hours minimum:  Change oxygen saturation probe site4.  Every 4-6 hours:  If on nasal continuous positive airway pressure, respiratory therapy assess nares and determine need for appliance change or resting period  4/21/2025 0539 by Gwen Prather RN  Outcome: Progressing  4/20/2025 1906 by Roseann Currie RN  Outcome: Progressing     Problem: Confusion  Goal: Confusion, delirium, dementia, or psychosis is improved or at baseline  Description: INTERVENTIONS:1. Assess for possible contributors to thought disturbance, including medications, impaired vision or hearing, underlying metabolic abnormalities, dehydration, psychiatric diagnoses, and notify attending LIP2. [place] high risk fall precautions, as indicated3. Provide frequent short contacts to provide reality reorientation, refocusing and direction4. Decrease environmental 
  Problem: Safety - Adult  Goal: Free from fall injury  4/21/2025 1900 by Mehnaz Levine RN  Outcome: Progressing  4/21/2025 1533 by Roseann Currie RN  Outcome: Progressing  4/21/2025 0539 by Gwen Prather RN  Outcome: Progressing     Problem: Chronic Conditions and Co-morbidities  Goal: Patient's chronic conditions and co-morbidity symptoms are monitored and maintained or improved  4/21/2025 1900 by Mehnaz Levine RN  Outcome: Progressing  4/21/2025 1533 by Roseann Currie RN  Outcome: Progressing  4/21/2025 0539 by Gwen Prather RN  Outcome: Progressing     Problem: Discharge Planning  Goal: Discharge to home or other facility with appropriate resources  4/21/2025 1900 by Mehnaz Levine RN  Outcome: Progressing  4/21/2025 1533 by Roseann Currie RN  Outcome: Progressing  4/21/2025 0539 by Gwen Prather RN  Outcome: Progressing     Problem: Pain  Goal: Verbalizes/displays adequate comfort level or baseline comfort level  4/21/2025 1900 by Mehnaz Levine RN  Outcome: Progressing  4/21/2025 1533 by Roseann Currie RN  Outcome: Progressing  4/21/2025 0539 by Gwen Prather RN  Outcome: Progressing     Problem: Skin/Tissue Integrity  Goal: Skin integrity remains intact  Description: 1.  Monitor for areas of redness and/or skin breakdown2.  Assess vascular access sites hourly3.  Every 4-6 hours minimum:  Change oxygen saturation probe site4.  Every 4-6 hours:  If on nasal continuous positive airway pressure, respiratory therapy assess nares and determine need for appliance change or resting period  4/21/2025 1900 by Mehnaz Levine RN  Outcome: Progressing  4/21/2025 1533 by Roseann Currie RN  Outcome: Progressing  4/21/2025 0539 by Gwen Prather RN  Outcome: Progressing     Problem: Confusion  Goal: Confusion, delirium, dementia, or psychosis is improved or at baseline  Description: INTERVENTIONS:1. Assess for possible contributors to thought disturbance, including medications, 
  Problem: Safety - Adult  Goal: Free from fall injury  4/21/2025 2151 by Chelo Dillon RN  Outcome: Progressing  Flowsheets (Taken 4/21/2025 2151)  Free From Fall Injury: Instruct family/caregiver on patient safety  4/21/2025 1900 by Mehnaz Levine RN  Outcome: Progressing  4/21/2025 1533 by Roseann Currie RN  Outcome: Progressing     Problem: Chronic Conditions and Co-morbidities  Goal: Patient's chronic conditions and co-morbidity symptoms are monitored and maintained or improved  4/21/2025 2151 by Chelo Dillon RN  Outcome: Progressing  Flowsheets (Taken 4/18/2025 1830 by Courtney Cuevas, RN)  Care Plan - Patient's Chronic Conditions and Co-Morbidity Symptoms are Monitored and Maintained or Improved:   Monitor and assess patient's chronic conditions and comorbid symptoms for stability, deterioration, or improvement   Collaborate with multidisciplinary team to address chronic and comorbid conditions and prevent exacerbation or deterioration   Update acute care plan with appropriate goals if chronic or comorbid symptoms are exacerbated and prevent overall improvement and discharge  4/21/2025 1900 by Mehnaz Levine RN  Outcome: Progressing  4/21/2025 1533 by Roseann Currie RN  Outcome: Progressing     Problem: Discharge Planning  Goal: Discharge to home or other facility with appropriate resources  4/21/2025 2151 by Chelo Dillon RN  Outcome: Progressing  Flowsheets (Taken 4/18/2025 1830 by Courtney Cuevas, RN)  Discharge to home or other facility with appropriate resources:   Identify barriers to discharge with patient and caregiver   Arrange for needed discharge resources and transportation as appropriate   Identify discharge learning needs (meds, wound care, etc)   Refer to discharge planning if patient needs post-hospital services based on physician order or complex needs related to functional status, cognitive ability or social support system  4/21/2025 1900 by Mehnaz Levine, TAYLOR  Outcome: 
  Problem: Safety - Adult  Goal: Free from fall injury  4/22/2025 2307 by Chelo Dillon RN  Outcome: Progressing  Flowsheets (Taken 4/22/2025 0933 by Cornelia Card RN)  Free From Fall Injury: Instruct family/caregiver on patient safety  4/22/2025 1733 by Cornelia Card RN  Outcome: Progressing  Flowsheets (Taken 4/22/2025 0933)  Free From Fall Injury: Instruct family/caregiver on patient safety     Problem: Chronic Conditions and Co-morbidities  Goal: Patient's chronic conditions and co-morbidity symptoms are monitored and maintained or improved  4/22/2025 2307 by Chelo Dillon RN  Outcome: Progressing  Flowsheets (Taken 4/22/2025 0800 by Cornelia Card RN)  Care Plan - Patient's Chronic Conditions and Co-Morbidity Symptoms are Monitored and Maintained or Improved: Monitor and assess patient's chronic conditions and comorbid symptoms for stability, deterioration, or improvement  4/22/2025 1733 by Cornelia Card RN  Outcome: Progressing  Flowsheets (Taken 4/22/2025 0800)  Care Plan - Patient's Chronic Conditions and Co-Morbidity Symptoms are Monitored and Maintained or Improved: Monitor and assess patient's chronic conditions and comorbid symptoms for stability, deterioration, or improvement     Problem: Discharge Planning  Goal: Discharge to home or other facility with appropriate resources  4/22/2025 2307 by Chelo Dillon RN  Flowsheets (Taken 4/22/2025 2307)  Discharge to home or other facility with appropriate resources:   Identify barriers to discharge with patient and caregiver   Arrange for needed discharge resources and transportation as appropriate   Identify discharge learning needs (meds, wound care, etc)  4/22/2025 1733 by Cornelia Card RN  Outcome: Progressing  Flowsheets (Taken 4/22/2025 0800)  Discharge to home or other facility with appropriate resources: Identify barriers to discharge with patient and caregiver     Problem: Pain  Goal: Verbalizes/displays adequate 
  Problem: Safety - Adult  Goal: Free from fall injury  4/25/2025 1041 by Mehnaz Santiago, RN  Outcome: Progressing     Problem: Pain  Goal: Verbalizes/displays adequate comfort level or baseline comfort level  4/25/2025 1041 by Mehnaz Santiago, RN  Outcome: Progressing     
  Problem: Safety - Adult  Goal: Free from fall injury  4/25/2025 2033 by Matilde Prabhakar RN  Outcome: Progressing  4/25/2025 1041 by Mehnaz Santiago RN  Outcome: Progressing     Problem: Chronic Conditions and Co-morbidities  Goal: Patient's chronic conditions and co-morbidity symptoms are monitored and maintained or improved  Outcome: Progressing     Problem: Discharge Planning  Goal: Discharge to home or other facility with appropriate resources  Outcome: Progressing     Problem: Pain  Goal: Verbalizes/displays adequate comfort level or baseline comfort level  4/25/2025 2033 by Matilde Prabhakar RN  Outcome: Progressing  4/25/2025 1041 by Mehnaz Santiago RN  Outcome: Progressing     Problem: Skin/Tissue Integrity  Goal: Skin integrity remains intact  Description: 1.  Monitor for areas of redness and/or skin breakdown2.  Assess vascular access sites hourly3.  Every 4-6 hours minimum:  Change oxygen saturation probe site4.  Every 4-6 hours:  If on nasal continuous positive airway pressure, respiratory therapy assess nares and determine need for appliance change or resting period  4/25/2025 2033 by Matilde Prabhakar RN  Outcome: Progressing  4/25/2025 1041 by Mehnaz Santiago RN  Outcome: Progressing     Problem: Confusion  Goal: Confusion, delirium, dementia, or psychosis is improved or at baseline  Description: INTERVENTIONS:1. Assess for possible contributors to thought disturbance, including medications, impaired vision or hearing, underlying metabolic abnormalities, dehydration, psychiatric diagnoses, and notify attending LIP2. [place] high risk fall precautions, as indicated3. Provide frequent short contacts to provide reality reorientation, refocusing and direction4. Decrease environmental stimuli, including noise as appropriate5. Monitor and intervene to maintain adequate nutrition, hydration, elimination, sleep and activity6. If unable to ensure safety without constant attention obtain 
  Problem: Safety - Adult  Goal: Free from fall injury  4/26/2025 2030 by Matilde Prabhakar RN  Outcome: Progressing  4/26/2025 1235 by Mehnaz Santiago RN  Outcome: Progressing     Problem: Chronic Conditions and Co-morbidities  Goal: Patient's chronic conditions and co-morbidity symptoms are monitored and maintained or improved  Outcome: Progressing     Problem: Discharge Planning  Goal: Discharge to home or other facility with appropriate resources  Outcome: Progressing     Problem: Pain  Goal: Verbalizes/displays adequate comfort level or baseline comfort level  4/26/2025 2030 by Matilde Prabhakar RN  Outcome: Progressing  4/26/2025 1235 by Mehnaz Santiago RN  Outcome: Progressing     Problem: Skin/Tissue Integrity  Goal: Skin integrity remains intact  Description: 1.  Monitor for areas of redness and/or skin breakdown2.  Assess vascular access sites hourly3.  Every 4-6 hours minimum:  Change oxygen saturation probe site4.  Every 4-6 hours:  If on nasal continuous positive airway pressure, respiratory therapy assess nares and determine need for appliance change or resting period  Outcome: Progressing     Problem: Confusion  Goal: Confusion, delirium, dementia, or psychosis is improved or at baseline  Description: INTERVENTIONS:1. Assess for possible contributors to thought disturbance, including medications, impaired vision or hearing, underlying metabolic abnormalities, dehydration, psychiatric diagnoses, and notify attending LIP2. [place] high risk fall precautions, as indicated3. Provide frequent short contacts to provide reality reorientation, refocusing and direction4. Decrease environmental stimuli, including noise as appropriate5. Monitor and intervene to maintain adequate nutrition, hydration, elimination, sleep and activity6. If unable to ensure safety without constant attention obtain sitter and review sitter guidelines with assigned personnel7. Initiate Psychosocial CNS and Spiritual Care 
  Problem: Safety - Adult  Goal: Free from fall injury  4/27/2025 1120 by Stormy Bliss, RN  Outcome: Progressing  Flowsheets (Taken 4/27/2025 0415 by Matilde Prabhakar, RN)  Free From Fall Injury: Instruct family/caregiver on patient safety     Problem: Discharge Planning  Goal: Discharge to home or other facility with appropriate resources  4/27/2025 1120 by Stormy Bliss, RN  Outcome: Progressing     
  Problem: Safety - Adult  Goal: Free from fall injury  4/30/2025 0614 by Lizette Baird RN  Outcome: Progressing  4/29/2025 1729 by Mehnaz Levine RN  Outcome: Progressing     Problem: Chronic Conditions and Co-morbidities  Goal: Patient's chronic conditions and co-morbidity symptoms are monitored and maintained or improved  4/30/2025 0614 by Lizette Baird RN  Outcome: Progressing  4/29/2025 1729 by Mehnaz Levine RN  Outcome: Progressing     Problem: Discharge Planning  Goal: Discharge to home or other facility with appropriate resources  4/30/2025 0614 by Lizette Baird RN  Outcome: Progressing  4/29/2025 1729 by Mehnaz Levine RN  Outcome: Progressing     Problem: Pain  Goal: Verbalizes/displays adequate comfort level or baseline comfort level  4/30/2025 0614 by Lizette Baird RN  Outcome: Progressing  4/29/2025 1729 by Mehnaz Levine RN  Outcome: Progressing     Problem: Skin/Tissue Integrity  Goal: Skin integrity remains intact  Description: 1.  Monitor for areas of redness and/or skin breakdown2.  Assess vascular access sites hourly3.  Every 4-6 hours minimum:  Change oxygen saturation probe site4.  Every 4-6 hours:  If on nasal continuous positive airway pressure, respiratory therapy assess nares and determine need for appliance change or resting period  4/30/2025 0614 by Lizette Baird RN  Outcome: Progressing  4/29/2025 1729 by Mehnaz Levine RN  Outcome: Progressing     Problem: Confusion  Goal: Confusion, delirium, dementia, or psychosis is improved or at baseline  Description: INTERVENTIONS:1. Assess for possible contributors to thought disturbance, including medications, impaired vision or hearing, underlying metabolic abnormalities, dehydration, psychiatric diagnoses, and notify attending LIP2. [place] high risk fall precautions, as indicated3. Provide frequent short contacts to provide reality reorientation, refocusing and direction4. Decrease environmental stimuli, including noise as 
  Problem: Safety - Adult  Goal: Free from fall injury  4/30/2025 0621 by Lizette Baird RN  Outcome: Progressing  4/30/2025 0614 by Lizette Baird RN  Outcome: Progressing  4/29/2025 1729 by Mehnaz Levine RN  Outcome: Progressing     Problem: Chronic Conditions and Co-morbidities  Goal: Patient's chronic conditions and co-morbidity symptoms are monitored and maintained or improved  4/30/2025 0621 by Lizette Baird RN  Outcome: Progressing  4/30/2025 0614 by Lizette Baird RN  Outcome: Progressing  4/29/2025 1729 by Mehnaz Levine RN  Outcome: Progressing     Problem: Discharge Planning  Goal: Discharge to home or other facility with appropriate resources  4/30/2025 0621 by Lizette Baird RN  Outcome: Progressing  4/30/2025 0614 by Lizette Baird RN  Outcome: Progressing  4/29/2025 1729 by Mehnaz Levine RN  Outcome: Progressing     Problem: Pain  Goal: Verbalizes/displays adequate comfort level or baseline comfort level  4/30/2025 0621 by Lizette Baird RN  Outcome: Progressing  4/30/2025 0614 by Lizette Baird RN  Outcome: Progressing  4/29/2025 1729 by Mehnaz Levine RN  Outcome: Progressing     Problem: Skin/Tissue Integrity  Goal: Skin integrity remains intact  Description: 1.  Monitor for areas of redness and/or skin breakdown2.  Assess vascular access sites hourly3.  Every 4-6 hours minimum:  Change oxygen saturation probe site4.  Every 4-6 hours:  If on nasal continuous positive airway pressure, respiratory therapy assess nares and determine need for appliance change or resting period  4/30/2025 0621 by Lizette Baird RN  Outcome: Progressing  4/30/2025 0614 by Lizette Baird RN  Outcome: Progressing  4/29/2025 1729 by Mehnaz Levine RN  Outcome: Progressing     Problem: Confusion  Goal: Confusion, delirium, dementia, or psychosis is improved or at baseline  Description: INTERVENTIONS:1. Assess for possible contributors to thought disturbance, including medications, impaired vision or hearing, 
  Problem: Safety - Adult  Goal: Free from fall injury  Outcome: Progressing     Problem: Chronic Conditions and Co-morbidities  Goal: Patient's chronic conditions and co-morbidity symptoms are monitored and maintained or improved  Outcome: Progressing     Problem: Discharge Planning  Goal: Discharge to home or other facility with appropriate resources  Outcome: Progressing     Problem: Pain  Goal: Verbalizes/displays adequate comfort level or baseline comfort level  Outcome: Progressing     Problem: Skin/Tissue Integrity  Goal: Skin integrity remains intact  Description: 1.  Monitor for areas of redness and/or skin breakdown2.  Assess vascular access sites hourly3.  Every 4-6 hours minimum:  Change oxygen saturation probe site4.  Every 4-6 hours:  If on nasal continuous positive airway pressure, respiratory therapy assess nares and determine need for appliance change or resting period  Outcome: Progressing     Problem: Confusion  Goal: Confusion, delirium, dementia, or psychosis is improved or at baseline  Description: INTERVENTIONS:1. Assess for possible contributors to thought disturbance, including medications, impaired vision or hearing, underlying metabolic abnormalities, dehydration, psychiatric diagnoses, and notify attending LIP2. [place] high risk fall precautions, as indicated3. Provide frequent short contacts to provide reality reorientation, refocusing and direction4. Decrease environmental stimuli, including noise as appropriate5. Monitor and intervene to maintain adequate nutrition, hydration, elimination, sleep and activity6. If unable to ensure safety without constant attention obtain sitter and review sitter guidelines with assigned personnel7. Initiate Psychosocial CNS and Spiritual Care consult, as indicated  Outcome: Progressing     
  Problem: Safety - Adult  Goal: Free from fall injury  Outcome: Progressing     Problem: Chronic Conditions and Co-morbidities  Goal: Patient's chronic conditions and co-morbidity symptoms are monitored and maintained or improved  Outcome: Progressing     Problem: Discharge Planning  Goal: Discharge to home or other facility with appropriate resources  Outcome: Progressing     Problem: Pain  Goal: Verbalizes/displays adequate comfort level or baseline comfort level  Outcome: Progressing     Problem: Skin/Tissue Integrity  Goal: Skin integrity remains intact  Description: 1.  Monitor for areas of redness and/or skin breakdown2.  Assess vascular access sites hourly3.  Every 4-6 hours minimum:  Change oxygen saturation probe site4.  Every 4-6 hours:  If on nasal continuous positive airway pressure, respiratory therapy assess nares and determine need for appliance change or resting period  Outcome: Progressing     Problem: Confusion  Goal: Confusion, delirium, dementia, or psychosis is improved or at baseline  Description: INTERVENTIONS:1. Assess for possible contributors to thought disturbance, including medications, impaired vision or hearing, underlying metabolic abnormalities, dehydration, psychiatric diagnoses, and notify attending LIP2. [place] high risk fall precautions, as indicated3. Provide frequent short contacts to provide reality reorientation, refocusing and direction4. Decrease environmental stimuli, including noise as appropriate5. Monitor and intervene to maintain adequate nutrition, hydration, elimination, sleep and activity6. If unable to ensure safety without constant attention obtain sitter and review sitter guidelines with assigned personnel7. Initiate Psychosocial CNS and Spiritual Care consult, as indicated  Outcome: Progressing     Problem: ABCDS Injury Assessment  Goal: Absence of physical injury  Outcome: Progressing     
  Problem: Safety - Adult  Goal: Free from fall injury  Outcome: Progressing     Problem: Chronic Conditions and Co-morbidities  Goal: Patient's chronic conditions and co-morbidity symptoms are monitored and maintained or improved  Outcome: Progressing     Problem: Discharge Planning  Goal: Discharge to home or other facility with appropriate resources  Outcome: Progressing     Problem: Pain  Goal: Verbalizes/displays adequate comfort level or baseline comfort level  Outcome: Progressing     Problem: Skin/Tissue Integrity  Goal: Skin integrity remains intact  Description: 1.  Monitor for areas of redness and/or skin breakdown2.  Assess vascular access sites hourly3.  Every 4-6 hours minimum:  Change oxygen saturation probe site4.  Every 4-6 hours:  If on nasal continuous positive airway pressure, respiratory therapy assess nares and determine need for appliance change or resting period  Outcome: Progressing     Problem: Confusion  Goal: Confusion, delirium, dementia, or psychosis is improved or at baseline  Description: INTERVENTIONS:1. Assess for possible contributors to thought disturbance, including medications, impaired vision or hearing, underlying metabolic abnormalities, dehydration, psychiatric diagnoses, and notify attending LIP2. [place] high risk fall precautions, as indicated3. Provide frequent short contacts to provide reality reorientation, refocusing and direction4. Decrease environmental stimuli, including noise as appropriate5. Monitor and intervene to maintain adequate nutrition, hydration, elimination, sleep and activity6. If unable to ensure safety without constant attention obtain sitter and review sitter guidelines with assigned personnel7. Initiate Psychosocial CNS and Spiritual Care consult, as indicated  Outcome: Progressing     Problem: ABCDS Injury Assessment  Goal: Absence of physical injury  Outcome: Progressing     Problem: Nutrition Deficit:  Goal: Optimize nutritional 
  Problem: Safety - Adult  Goal: Free from fall injury  Outcome: Progressing  Flowsheets (Taken 4/18/2025 1847)  Free From Fall Injury:   Instruct family/caregiver on patient safety   Based on caregiver fall risk screen, instruct family/caregiver to ask for assistance with transferring infant if caregiver noted to have fall risk factors     Problem: Chronic Conditions and Co-morbidities  Goal: Patient's chronic conditions and co-morbidity symptoms are monitored and maintained or improved  Outcome: Progressing  Flowsheets (Taken 4/18/2025 1830)  Care Plan - Patient's Chronic Conditions and Co-Morbidity Symptoms are Monitored and Maintained or Improved:   Monitor and assess patient's chronic conditions and comorbid symptoms for stability, deterioration, or improvement   Collaborate with multidisciplinary team to address chronic and comorbid conditions and prevent exacerbation or deterioration   Update acute care plan with appropriate goals if chronic or comorbid symptoms are exacerbated and prevent overall improvement and discharge     Problem: Discharge Planning  Goal: Discharge to home or other facility with appropriate resources  Outcome: Progressing  Flowsheets (Taken 4/18/2025 1830)  Discharge to home or other facility with appropriate resources:   Identify barriers to discharge with patient and caregiver   Arrange for needed discharge resources and transportation as appropriate   Identify discharge learning needs (meds, wound care, etc)   Refer to discharge planning if patient needs post-hospital services based on physician order or complex needs related to functional status, cognitive ability or social support system     Problem: Pain  Goal: Verbalizes/displays adequate comfort level or baseline comfort level  Outcome: Progressing     Problem: Skin/Tissue Integrity  Goal: Skin integrity remains intact  Description: 1.  Monitor for areas of redness and/or skin breakdown2.  Assess vascular access sites hourly3.  
  Problem: Safety - Adult  Goal: Free from fall injury  Outcome: Progressing  Flowsheets (Taken 4/22/2025 0933 by Cornelia Card, RN)  Free From Fall Injury: Instruct family/caregiver on patient safety     Problem: Chronic Conditions and Co-morbidities  Goal: Patient's chronic conditions and co-morbidity symptoms are monitored and maintained or improved  Outcome: Progressing  Flowsheets (Taken 4/22/2025 0800 by Cornelia Card, RN)  Care Plan - Patient's Chronic Conditions and Co-Morbidity Symptoms are Monitored and Maintained or Improved: Monitor and assess patient's chronic conditions and comorbid symptoms for stability, deterioration, or improvement     Problem: Discharge Planning  Goal: Discharge to home or other facility with appropriate resources  Outcome: Progressing  Flowsheets (Taken 4/22/2025 2307)  Discharge to home or other facility with appropriate resources:   Identify barriers to discharge with patient and caregiver   Arrange for needed discharge resources and transportation as appropriate   Identify discharge learning needs (meds, wound care, etc)     Problem: Pain  Goal: Verbalizes/displays adequate comfort level or baseline comfort level  Outcome: Progressing  Flowsheets  Taken 4/23/2025 2032 by Chelo Dillon RN  Verbalizes/displays adequate comfort level or baseline comfort level:   Assess pain using appropriate pain scale   Administer analgesics based on type and severity of pain and evaluate response   Implement non-pharmacological measures as appropriate and evaluate response  Taken 4/23/2025 0800 by Cornelia Card, RN  Verbalizes/displays adequate comfort level or baseline comfort level: Encourage patient to monitor pain and request assistance     
  Problem: Safety - Adult  Goal: Free from fall injury  Outcome: Progressing  Flowsheets (Taken 4/22/2025 0933)  Free From Fall Injury: Instruct family/caregiver on patient safety     Problem: Chronic Conditions and Co-morbidities  Goal: Patient's chronic conditions and co-morbidity symptoms are monitored and maintained or improved  Outcome: Progressing  Flowsheets (Taken 4/22/2025 0800)  Care Plan - Patient's Chronic Conditions and Co-Morbidity Symptoms are Monitored and Maintained or Improved: Monitor and assess patient's chronic conditions and comorbid symptoms for stability, deterioration, or improvement     Problem: Discharge Planning  Goal: Discharge to home or other facility with appropriate resources  Outcome: Progressing  Flowsheets (Taken 4/22/2025 0800)  Discharge to home or other facility with appropriate resources: Identify barriers to discharge with patient and caregiver     Problem: Pain  Goal: Verbalizes/displays adequate comfort level or baseline comfort level  Outcome: Progressing  Flowsheets  Taken 4/22/2025 1600  Verbalizes/displays adequate comfort level or baseline comfort level:   Encourage patient to monitor pain and request assistance   Assess pain using appropriate pain scale  Taken 4/22/2025 1200  Verbalizes/displays adequate comfort level or baseline comfort level: Encourage patient to monitor pain and request assistance  Taken 4/22/2025 0800  Verbalizes/displays adequate comfort level or baseline comfort level: Encourage patient to monitor pain and request assistance     Problem: Skin/Tissue Integrity  Goal: Skin integrity remains intact  Description: 1.  Monitor for areas of redness and/or skin breakdown2.  Assess vascular access sites hourly3.  Every 4-6 hours minimum:  Change oxygen saturation probe site4.  Every 4-6 hours:  If on nasal continuous positive airway pressure, respiratory therapy assess nares and determine need for appliance change or resting period  Outcome: 
  Problem: Safety - Adult  Goal: Free from fall injury  Outcome: Progressing  Flowsheets (Taken 4/25/2025 0125)  Free From Fall Injury: Instruct family/caregiver on patient safety     Problem: Chronic Conditions and Co-morbidities  Goal: Patient's chronic conditions and co-morbidity symptoms are monitored and maintained or improved  Outcome: Progressing  Flowsheets (Taken 4/24/2025 2000)  Care Plan - Patient's Chronic Conditions and Co-Morbidity Symptoms are Monitored and Maintained or Improved:   Monitor and assess patient's chronic conditions and comorbid symptoms for stability, deterioration, or improvement   Collaborate with multidisciplinary team to address chronic and comorbid conditions and prevent exacerbation or deterioration   Update acute care plan with appropriate goals if chronic or comorbid symptoms are exacerbated and prevent overall improvement and discharge     Problem: Discharge Planning  Goal: Discharge to home or other facility with appropriate resources  Flowsheets (Taken 4/24/2025 2000)  Discharge to home or other facility with appropriate resources:   Identify barriers to discharge with patient and caregiver   Identify discharge learning needs (meds, wound care, etc)   Arrange for needed discharge resources and transportation as appropriate     Problem: Pain  Goal: Verbalizes/displays adequate comfort level or baseline comfort level  Outcome: Progressing  Flowsheets (Taken 4/24/2025 2000)  Verbalizes/displays adequate comfort level or baseline comfort level:   Assess pain using appropriate pain scale   Administer analgesics based on type and severity of pain and evaluate response   Implement non-pharmacological measures as appropriate and evaluate response   Consider cultural and social influences on pain and pain management   Notify Licensed Independent Practitioner if interventions unsuccessful or patient reports new pain     Problem: Skin/Tissue Integrity  Goal: Skin integrity remains 
  Problem: Safety - Adult  Goal: Free from fall injury  Outcome: Progressing  Flowsheets (Taken 4/27/2025 0415 by Matilde Prabhakar, RN)  Free From Fall Injury: Instruct family/caregiver on patient safety     Problem: Discharge Planning  Goal: Discharge to home or other facility with appropriate resources  Outcome: Progressing     Problem: Pain  Goal: Verbalizes/displays adequate comfort level or baseline comfort level  Outcome: Progressing     Problem: Skin/Tissue Integrity  Goal: Skin integrity remains intact  Description: 1.  Monitor for areas of redness and/or skin breakdown2.  Assess vascular access sites hourly3.  Every 4-6 hours minimum:  Change oxygen saturation probe site4.  Every 4-6 hours:  If on nasal continuous positive airway pressure, respiratory therapy assess nares and determine need for appliance change or resting period  Recent Flowsheet Documentation  Taken 4/27/2025 0415 by Matilde Prabhakar, RN  Skin Integrity Remains Intact: Monitor for areas of redness and/or skin breakdown     Problem: ABCDS Injury Assessment  Goal: Absence of physical injury  Recent Flowsheet Documentation  Taken 4/27/2025 0415 by Matilde Prabhakar, RN  Absence of Physical Injury: Implement safety measures based on patient assessment     
  Problem: Safety - Adult  Goal: Free from fall injury  Recent Flowsheet Documentation  Taken 4/27/2025 2000 by Stefania Renee  Free From Fall Injury: Instruct family/caregiver on patient safety     Problem: Confusion  Goal: Confusion, delirium, dementia, or psychosis is improved or at baseline  Description: INTERVENTIONS:1. Assess for possible contributors to thought disturbance, including medications, impaired vision or hearing, underlying metabolic abnormalities, dehydration, psychiatric diagnoses, and notify attending LIP2. [place] high risk fall precautions, as indicated3. Provide frequent short contacts to provide reality reorientation, refocusing and direction4. Decrease environmental stimuli, including noise as appropriate5. Monitor and intervene to maintain adequate nutrition, hydration, elimination, sleep and activity6. If unable to ensure safety without constant attention obtain sitter and review sitter guidelines with assigned personnel7. Initiate Psychosocial CNS and Spiritual Care consult, as indicated  Recent Flowsheet Documentation  Taken 4/28/2025 0000 by Stefania Renee  Effect of thought disturbance (confusion, delirium, dementia, or psychosis) are managed with adequate functional status:   [place] high risk fall precautions, as indicated   Decrease environmental stimuli, including noise as appropriate  Taken 4/27/2025 2000 by Stefania Renee  Effect of thought disturbance (confusion, delirium, dementia, or psychosis) are managed with adequate functional status:   [place] high risk fall precautions, as indicated   Decrease environmental stimuli, including noise as appropriate     Problem: Discharge Planning  Goal: Discharge to home or other facility with appropriate resources  Recent Flowsheet Documentation  Taken 4/28/2025 0000 by Stefania Renee  Discharge to home or other facility with appropriate resources: Identify barriers to discharge with patient and caregiver  Taken 4/27/2025 2000 by 
GI consulted for PEG tube placement    Per neurology note Guardian would like to proceed with PEG  Attempted to call guardian myself to confirm and had to leave message  Will await callback    Please make patient n.p.o. at midnight-hold tube feeds  Will go ahead and schedule for tomorrow in anticipation of receiving approval    Complete consult note to follow tomorrow    Alexander Peña, CNP  
I signed up for this patient in error, I did not see or evaluate this patient or participate in their care.    Rosalinda Gibbs, DO  Emergency Medicine PGY-1  
appropriate5. Monitor and intervene to maintain adequate nutrition, hydration, elimination, sleep and activity6. If unable to ensure safety without constant attention obtain sitter and review sitter guidelines with assigned personnel7. Initiate Psychosocial CNS and Spiritual Care consult, as indicated  4/29/2025 1729 by Mehnaz Levine, RN  Outcome: Progressing  4/29/2025 0737 by Lizette Baird RN  Outcome: Progressing     Problem: ABCDS Injury Assessment  Goal: Absence of physical injury  4/29/2025 1729 by Mehnaz Levine, RN  Outcome: Progressing  4/29/2025 0737 by Lizette Baird RN  Outcome: Progressing     Problem: Nutrition Deficit:  Goal: Optimize nutritional status  4/29/2025 1729 by Mehnaz Levine RN  Outcome: Progressing  4/29/2025 0737 by Lizette Baird RN  Outcome: Progressing     
medications, impaired vision or hearing, underlying metabolic abnormalities, dehydration, psychiatric diagnoses, and notify attending LIP2. [place] high risk fall precautions, as indicated3. Provide frequent short contacts to provide reality reorientation, refocusing and direction4. Decrease environmental stimuli, including noise as appropriate5. Monitor and intervene to maintain adequate nutrition, hydration, elimination, sleep and activity6. If unable to ensure safety without constant attention obtain sitter and review sitter guidelines with assigned personnel7. Initiate Psychosocial CNS and Spiritual Care consult, as indicated  4/19/2025 1000 by Scott Cardenas, RN  Outcome: Progressing  4/19/2025 0534 by Gwen Prather, RN  Outcome: Progressing  4/18/2025 2212 by Abdirahman Cevallos, RN  Outcome: Progressing     
metabolic abnormalities, dehydration, psychiatric diagnoses, and notify attending LIP2. [place] high risk fall precautions, as indicated3. Provide frequent short contacts to provide reality reorientation, refocusing and direction4. Decrease environmental stimuli, including noise as appropriate5. Monitor and intervene to maintain adequate nutrition, hydration, elimination, sleep and activity6. If unable to ensure safety without constant attention obtain sitter and review sitter guidelines with assigned personnel7. Initiate Psychosocial CNS and Spiritual Care consult, as indicated  4/30/2025 1432 by Mehnaz Levine RN  Outcome: Completed  4/30/2025 0621 by Lizette Baird RN  Outcome: Progressing  4/30/2025 0614 by Lizette Baird RN  Outcome: Progressing     Problem: ABCDS Injury Assessment  Goal: Absence of physical injury  4/30/2025 1432 by Mehnaz Levine, RN  Outcome: Completed  4/30/2025 0621 by Lizette Baird RN  Outcome: Progressing  4/30/2025 0614 by Lizette Baird RN  Outcome: Progressing     Problem: Nutrition Deficit:  Goal: Optimize nutritional status  4/30/2025 1432 by Mehnaz Levine, RN  Outcome: Completed  4/30/2025 0621 by Lizette Baird RN  Outcome: Progressing  4/30/2025 0614 by Lizette Baird RN  Outcome: Progressing     
stimuli, including noise as appropriate5. Monitor and intervene to maintain adequate nutrition, hydration, elimination, sleep and activity6. If unable to ensure safety without constant attention obtain sitter and review sitter guidelines with assigned personnel7. Initiate Psychosocial CNS and Spiritual Care consult, as indicated  4/21/2025 1533 by Roseann Currie, RN  Outcome: Progressing  4/21/2025 0539 by Gwen Prather, RN  Outcome: Progressing     Problem: ABCDS Injury Assessment  Goal: Absence of physical injury  4/21/2025 1533 by Roseann Currie, RN  Outcome: Progressing  4/21/2025 0539 by Gwen Prather, RN  Outcome: Progressing     
LIP2. [place] high risk fall precautions, as indicated3. Provide frequent short contacts to provide reality reorientation, refocusing and direction4. Decrease environmental stimuli, including noise as appropriate5. Monitor and intervene to maintain adequate nutrition, hydration, elimination, sleep and activity6. If unable to ensure safety without constant attention obtain sitter and review sitter guidelines with assigned personnel7. Initiate Psychosocial CNS and Spiritual Care consult, as indicated  4/19/2025 0534 by Gwen Prather, RN  Outcome: Progressing  4/18/2025 0352 by Abdirahman Cevallos, RN  Outcome: Progressing

## 2025-04-30 NOTE — DISCHARGE INSTR - COC
and Therapy:  Puncture 04/18/25 (Active)   Site Assessment Clean, dry, and intact 04/27/25 1600   Steph-wound Assessment No change 04/24/25 1230   Closure Other (comment) 04/23/25 1600   Amount of Air Infused (mL) 10 mL 04/23/25 2000   Amount of Air Released (mL) 10 mL 04/23/25 2000   Drainage Amount None 04/27/25 1600   Odor None 04/27/25 1600   Dressing/Treatment Open to air 04/27/25 1600   Dressing Status Clean, dry & intact 04/24/25 1230   Number of days: 12       Wound 04/21/25 Coccyx (Active)   Wound Image   04/22/25 1309   Wound Etiology Pressure Stage 2 04/30/25 0400   Dressing Status Clean;Dry 04/30/25 0400   Wound Cleansed Not Cleansed 04/30/25 0400   Dressing/Treatment Zinc paste;Open to air 04/30/25 0400   Wound Assessment Granulation tissue;Pink/red 04/27/25 1600   Drainage Amount None (dry) 04/29/25 1600   Drainage Description Sanguinous 04/24/25 0800   Odor None 04/25/25 1940   Steph-wound Assessment Blanchable erythema 04/24/25 1000   Number of days: 9        Elimination:  Continence:   Bowel: No  Bladder: No  Urinary Catheter: None   Colostomy/Ileostomy/Ileal Conduit: No       Date of Last BM: 4/29/2025    Intake/Output Summary (Last 24 hours) at 4/30/2025 1009  Last data filed at 4/30/2025 0453  Gross per 24 hour   Intake 969 ml   Output 400 ml   Net 569 ml     I/O last 3 completed shifts:  In: 1541 [NG/GT:1541]  Out: 775 [Urine:775]    Safety Concerns:     History of Falls (last 30 days)    Impairments/Disabilities:      Speech    Nutrition Therapy:  Current Nutrition Therapy:   - Tube Feedings:  Standard with fiber    Routes of Feeding: Gastrostomy Tube  Liquids: No Liquids  Daily Fluid Restriction: no  Last Modified Barium Swallow with Video (Video Swallowing Test): not done    Treatments at the Time of Hospital Discharge:   Respiratory Treatments: ***  Oxygen Therapy:  is not on home oxygen therapy.  Ventilator:    - No ventilator support    Rehab Therapies: Physical Therapy   Weight Bearing

## 2025-04-30 NOTE — CARE COORDINATION
Case Management   Daily Progress Note       Patient Name: Carrington Mulligan                   YOB: 1955  Diagnosis: Acute ischemic left middle cerebral artery (MCA) stroke (HCC) [I63.512]  Cerebrovascular accident (CVA), unspecified mechanism (HCC) [I63.9]                       GMLOS: 7.3 days  Length of Stay: 12  days    Anticipated Discharge Date: Ready for discharge    Readmission Risk (Low < 19, Mod (19-27), High > 27): Readmission Risk Score: 15.9        Current Transitional Plan    [] Home Independently    [] Home with HC    [x] Skilled Nursing Facility    [] Acute Rehabilitation    [] Long Term Acute Care (LTAC)    [] Other:     Plan for the Stay (Medical Management) :          Workflow Continuation (Additional Notes) :    Spoke to Diamond at [facility] in [place] and they are able to accept today. Transport paperwork faxed to [facility]. Transport arranged for 2pm with Vidal SAWANT completed.     Called and left a  with Brad Gillig informing of pt.'s discharge to [facility] at 2pm today.     Chaz Kemp  April 30, 2025

## 2025-05-12 PROBLEM — N39.0 UTI (URINARY TRACT INFECTION): Status: RESOLVED | Noted: 2025-04-12 | Resolved: 2025-05-12

## 2025-06-06 PROBLEM — Z93.1 S/P PERCUTANEOUS ENDOSCOPIC GASTROSTOMY (PEG) TUBE PLACEMENT (HCC): Status: ACTIVE | Noted: 2025-06-06

## 2025-06-29 ENCOUNTER — HOSPITAL ENCOUNTER (OUTPATIENT)
Age: 70
Setting detail: SPECIMEN
Discharge: HOME | End: 2025-06-29
Payer: MEDICARE

## 2025-06-29 LAB
C DIFF GDH + TOXINS A+B STL QL IA.RAPID: NEGATIVE
SPECIMEN DESCRIPTION: NORMAL

## 2025-06-29 PROCEDURE — 87449 NOS EACH ORGANISM AG IA: CPT

## 2025-06-29 PROCEDURE — 87324 CLOSTRIDIUM AG IA: CPT

## 2025-07-29 PROBLEM — Z86.73 HISTORY OF STROKE: Status: ACTIVE | Noted: 2025-07-29

## 2025-09-01 PROBLEM — I63.512 ACUTE ISCHEMIC LEFT MIDDLE CEREBRAL ARTERY (MCA) STROKE (HCC): Status: RESOLVED | Noted: 2025-04-18 | Resolved: 2025-09-01

## 2025-09-01 PROBLEM — I63.9 CEREBROVASCULAR ACCIDENT (CVA) (HCC): Status: RESOLVED | Noted: 2025-04-19 | Resolved: 2025-09-01

## 2025-09-02 ENCOUNTER — HOSPITAL ENCOUNTER (EMERGENCY)
Age: 70
Discharge: HOME | End: 2025-09-02
Attending: EMERGENCY MEDICINE
Payer: MEDICARE

## 2025-09-02 ENCOUNTER — APPOINTMENT (OUTPATIENT)
Dept: CT IMAGING | Age: 70
End: 2025-09-02
Payer: MEDICARE

## 2025-09-02 VITALS
DIASTOLIC BLOOD PRESSURE: 59 MMHG | RESPIRATION RATE: 16 BRPM | OXYGEN SATURATION: 97 % | SYSTOLIC BLOOD PRESSURE: 121 MMHG | HEART RATE: 68 BPM | TEMPERATURE: 97.5 F

## 2025-09-02 DIAGNOSIS — R31.9 URINARY TRACT INFECTION WITH HEMATURIA, SITE UNSPECIFIED: Primary | ICD-10-CM

## 2025-09-02 DIAGNOSIS — N39.0 URINARY TRACT INFECTION WITH HEMATURIA, SITE UNSPECIFIED: Primary | ICD-10-CM

## 2025-09-02 LAB
ANION GAP SERPL CALCULATED.3IONS-SCNC: 11 MMOL/L (ref 9–16)
BACTERIA URNS QL MICRO: ABNORMAL
BASOPHILS # BLD: 0.04 K/UL (ref 0–0.2)
BASOPHILS NFR BLD: 0 % (ref 0–2)
BILIRUB UR QL STRIP: NEGATIVE
BUN SERPL-MCNC: 24 MG/DL (ref 8–23)
BUN/CREAT SERPL: 30 (ref 9–20)
CALCIUM SERPL-MCNC: 10.5 MG/DL (ref 8.6–10.4)
CHLORIDE SERPL-SCNC: 104 MMOL/L (ref 98–107)
CLARITY UR: ABNORMAL
CO2 SERPL-SCNC: 27 MMOL/L (ref 20–31)
COLOR UR: YELLOW
CREAT SERPL-MCNC: 0.8 MG/DL (ref 0.7–1.2)
DATE, STOOL #1: NORMAL
EOSINOPHIL # BLD: 0.07 K/UL (ref 0–0.44)
EOSINOPHILS RELATIVE PERCENT: 1 % (ref 1–4)
EPI CELLS #/AREA URNS HPF: ABNORMAL /HPF (ref 0–5)
ERYTHROCYTE [DISTWIDTH] IN BLOOD BY AUTOMATED COUNT: 12.6 % (ref 11.8–14.4)
GFR, ESTIMATED: >90 ML/MIN/1.73M2
GLUCOSE SERPL-MCNC: 87 MG/DL (ref 74–99)
GLUCOSE UR STRIP-MCNC: NEGATIVE MG/DL
HCT VFR BLD AUTO: 38.2 % (ref 40.7–50.3)
HEMOCCULT SP1 STL QL: NEGATIVE
HGB BLD-MCNC: 12.9 G/DL (ref 13–17)
HGB UR QL STRIP.AUTO: ABNORMAL
IMM GRANULOCYTES # BLD AUTO: 0.03 K/UL (ref 0–0.3)
IMM GRANULOCYTES NFR BLD: 0 %
INR PPP: 1.2
KETONES UR STRIP-MCNC: NEGATIVE MG/DL
LEUKOCYTE ESTERASE UR QL STRIP: ABNORMAL
LYMPHOCYTES NFR BLD: 0.79 K/UL (ref 1.1–3.7)
LYMPHOCYTES RELATIVE PERCENT: 8 % (ref 24–43)
MCH RBC QN AUTO: 31.6 PG (ref 25.2–33.5)
MCHC RBC AUTO-ENTMCNC: 33.8 G/DL (ref 28.4–34.8)
MCV RBC AUTO: 93.6 FL (ref 82.6–102.9)
MONOCYTES NFR BLD: 0.92 K/UL (ref 0.1–1.2)
MONOCYTES NFR BLD: 10 % (ref 3–12)
NEUTROPHILS NFR BLD: 81 % (ref 36–65)
NEUTS SEG NFR BLD: 7.84 K/UL (ref 1.5–8.1)
NITRITE UR QL STRIP: NEGATIVE
NRBC BLD-RTO: 0 PER 100 WBC
PH UR STRIP: 7.5 [PH] (ref 5–9)
PLATELET # BLD AUTO: 176 K/UL (ref 138–453)
PMV BLD AUTO: 11.7 FL (ref 8.1–13.5)
POTASSIUM SERPL-SCNC: 4 MMOL/L (ref 3.7–5.3)
PROT UR STRIP-MCNC: NEGATIVE MG/DL
PROTHROMBIN TIME: 15.9 SEC (ref 12–15)
RBC # BLD AUTO: 4.08 M/UL (ref 4.21–5.77)
RBC #/AREA URNS HPF: ABNORMAL /HPF (ref 0–2)
SODIUM SERPL-SCNC: 142 MMOL/L (ref 136–145)
SP GR UR STRIP: 1.01 (ref 1.01–1.02)
TIME, STOOL #1: 950
UROBILINOGEN UR STRIP-ACNC: NORMAL EU/DL (ref 0–1)
WBC #/AREA URNS HPF: ABNORMAL /HPF (ref 0–5)
WBC OTHER # BLD: 9.7 K/UL (ref 3.5–11.3)

## 2025-09-02 PROCEDURE — 6360000004 HC RX CONTRAST MEDICATION: Performed by: EMERGENCY MEDICINE

## 2025-09-02 PROCEDURE — 80048 BASIC METABOLIC PNL TOTAL CA: CPT

## 2025-09-02 PROCEDURE — 85610 PROTHROMBIN TIME: CPT

## 2025-09-02 PROCEDURE — 82272 OCCULT BLD FECES 1-3 TESTS: CPT

## 2025-09-02 PROCEDURE — 99285 EMERGENCY DEPT VISIT HI MDM: CPT

## 2025-09-02 PROCEDURE — 2500000003 HC RX 250 WO HCPCS: Performed by: EMERGENCY MEDICINE

## 2025-09-02 PROCEDURE — 81001 URINALYSIS AUTO W/SCOPE: CPT

## 2025-09-02 PROCEDURE — 96374 THER/PROPH/DIAG INJ IV PUSH: CPT

## 2025-09-02 PROCEDURE — 74177 CT ABD & PELVIS W/CONTRAST: CPT

## 2025-09-02 PROCEDURE — 6360000002 HC RX W HCPCS: Performed by: EMERGENCY MEDICINE

## 2025-09-02 PROCEDURE — 85025 COMPLETE CBC W/AUTO DIFF WBC: CPT

## 2025-09-02 RX ORDER — IOPAMIDOL 755 MG/ML
75 INJECTION, SOLUTION INTRAVASCULAR
Status: COMPLETED | OUTPATIENT
Start: 2025-09-02 | End: 2025-09-02

## 2025-09-02 RX ORDER — CEPHALEXIN 500 MG/1
500 CAPSULE ORAL 2 TIMES DAILY
Qty: 14 CAPSULE | Refills: 0 | Status: SHIPPED | OUTPATIENT
Start: 2025-09-02 | End: 2025-09-09

## 2025-09-02 RX ORDER — DOCUSATE SODIUM 100 MG/1
100 CAPSULE, LIQUID FILLED ORAL 2 TIMES DAILY PRN
Qty: 60 CAPSULE | Refills: 0 | Status: SHIPPED | OUTPATIENT
Start: 2025-09-02 | End: 2025-10-02

## 2025-09-02 RX ADMIN — CEFTRIAXONE SODIUM 1000 MG: 1 INJECTION, POWDER, FOR SOLUTION INTRAMUSCULAR; INTRAVENOUS at 13:10

## 2025-09-02 RX ADMIN — IOPAMIDOL 75 ML: 755 INJECTION, SOLUTION INTRAVENOUS at 11:18

## 2025-09-02 ASSESSMENT — PAIN - FUNCTIONAL ASSESSMENT: PAIN_FUNCTIONAL_ASSESSMENT: ADULT NONVERBAL PAIN SCALE (NPVS)

## (undated) DEVICE — BANDAGE COHESIVE 12LB W2XL180IN KID DSGN SELF ADH CLSR STD

## (undated) DEVICE — BANDAGE COMPR W2INXL5YD ASST CLR SELF ADH WRP CARING

## (undated) DEVICE — GAUZE,SPONGE,FLUFF,4"X4",12PLY,STRL,2'S: Brand: MEDLINE

## (undated) DEVICE — SINGLE-USE BIOPSY FORCEPS: Brand: RADIAL JAW 4

## (undated) DEVICE — GAUZE,SPONGE,4"X4",16PLY,XRAY,STRL,LF: Brand: MEDLINE

## (undated) DEVICE — DISCONTINUED NO SUB IDED TG GLOVE SURG SENSICARE ALOE LT LF PF ST GRN SZ 8

## (undated) DEVICE — SHOE POST OPERATIVE HK  LOOP CLOSURE SM BLK PROCARE

## (undated) DEVICE — NEEDLE HYPO 25GA L1.5IN BLU POLYPR HUB S STL REG BVL STR

## (undated) DEVICE — BANDAGE GZ W2XL75IN ST RAYON POLY CNFRM STRTCH LTWT

## (undated) DEVICE — TOWEL,OR,DSP,ST,NATURAL,DLX,4/PK,20PK/CS: Brand: MEDLINE

## (undated) DEVICE — MIC* SAFETY PERCUTANEOUS ENDOSCOPIC GASTROSTOMY PEG KIT - 20 FR - PULL: Brand: MIC PEG TUBE

## (undated) DEVICE — HYPODERMIC SAFETY NEEDLE: Brand: MAGELLAN

## (undated) DEVICE — GLOVE SURG SZ 75 L12IN FNGR THK87MIL WHT LTX FREE

## (undated) DEVICE — BITEBLOCK 54FR W/ DENT RIM BLOX

## (undated) DEVICE — GLOVE ORANGE PI 7 1/2   MSG9075

## (undated) DEVICE — BINDER ABD UNISX 9IN 62IN L AND XL UNIV

## (undated) DEVICE — BLADE SURG NO15 S STL STR DISP GLASSVAN